# Patient Record
Sex: MALE | Race: BLACK OR AFRICAN AMERICAN | NOT HISPANIC OR LATINO | URBAN - METROPOLITAN AREA
[De-identification: names, ages, dates, MRNs, and addresses within clinical notes are randomized per-mention and may not be internally consistent; named-entity substitution may affect disease eponyms.]

---

## 2017-10-11 ENCOUNTER — INPATIENT (INPATIENT)
Facility: HOSPITAL | Age: 82
LOS: 0 days | Discharge: HOME | DRG: 243 | End: 2017-10-12
Attending: INTERNAL MEDICINE | Admitting: INTERNAL MEDICINE
Payer: MEDICARE

## 2017-10-11 ENCOUNTER — TRANSCRIPTION ENCOUNTER (OUTPATIENT)
Age: 82
End: 2017-10-11

## 2017-10-11 VITALS
WEIGHT: 179.9 LBS | OXYGEN SATURATION: 95 % | HEART RATE: 66 BPM | HEIGHT: 70 IN | RESPIRATION RATE: 16 BRPM | TEMPERATURE: 98 F | SYSTOLIC BLOOD PRESSURE: 154 MMHG | DIASTOLIC BLOOD PRESSURE: 67 MMHG

## 2017-10-11 DIAGNOSIS — Z85.46 PERSONAL HISTORY OF MALIGNANT NEOPLASM OF PROSTATE: Chronic | ICD-10-CM

## 2017-10-11 DIAGNOSIS — I49.9 CARDIAC ARRHYTHMIA, UNSPECIFIED: ICD-10-CM

## 2017-10-11 LAB
ANION GAP SERPL CALC-SCNC: 12 MMOL/L — SIGNIFICANT CHANGE UP (ref 5–17)
ANION GAP SERPL CALC-SCNC: 19 MMOL/L — HIGH (ref 5–17)
APTT BLD: 32.6 SEC — SIGNIFICANT CHANGE UP (ref 27.5–37.4)
BASOPHILS # BLD AUTO: 0 K/UL — SIGNIFICANT CHANGE UP (ref 0–0.2)
BASOPHILS NFR BLD AUTO: 0.4 % — SIGNIFICANT CHANGE UP (ref 0–2)
BUN SERPL-MCNC: 13 MG/DL — SIGNIFICANT CHANGE UP (ref 7–23)
BUN SERPL-MCNC: 20 MG/DL — SIGNIFICANT CHANGE UP (ref 7–23)
CALCIUM SERPL-MCNC: 9.2 MG/DL — SIGNIFICANT CHANGE UP (ref 8.4–10.5)
CALCIUM SERPL-MCNC: 9.7 MG/DL — SIGNIFICANT CHANGE UP (ref 8.4–10.5)
CHLORIDE SERPL-SCNC: 103 MMOL/L — SIGNIFICANT CHANGE UP (ref 96–108)
CHLORIDE SERPL-SCNC: 94 MMOL/L — LOW (ref 96–108)
CO2 SERPL-SCNC: 21 MMOL/L — LOW (ref 22–31)
CO2 SERPL-SCNC: 25 MMOL/L — SIGNIFICANT CHANGE UP (ref 22–31)
CREAT SERPL-MCNC: 0.75 MG/DL — SIGNIFICANT CHANGE UP (ref 0.5–1.3)
CREAT SERPL-MCNC: 0.99 MG/DL — SIGNIFICANT CHANGE UP (ref 0.5–1.3)
EOSINOPHIL # BLD AUTO: 0 K/UL — SIGNIFICANT CHANGE UP (ref 0–0.5)
EOSINOPHIL NFR BLD AUTO: 0.1 % — SIGNIFICANT CHANGE UP (ref 0–6)
GLUCOSE SERPL-MCNC: 101 MG/DL — HIGH (ref 70–99)
GLUCOSE SERPL-MCNC: 164 MG/DL — HIGH (ref 70–99)
HCT VFR BLD CALC: 36.9 % — LOW (ref 39–50)
HCT VFR BLD CALC: 43 % — SIGNIFICANT CHANGE UP (ref 39–50)
HGB BLD-MCNC: 13 G/DL — SIGNIFICANT CHANGE UP (ref 13–17)
HGB BLD-MCNC: 14 G/DL — SIGNIFICANT CHANGE UP (ref 13–17)
INR BLD: 1.14 RATIO — SIGNIFICANT CHANGE UP (ref 0.88–1.16)
LYMPHOCYTES # BLD AUTO: 0.9 K/UL — LOW (ref 1–3.3)
LYMPHOCYTES # BLD AUTO: 15.3 % — SIGNIFICANT CHANGE UP (ref 13–44)
MCHC RBC-ENTMCNC: 27.7 PG — SIGNIFICANT CHANGE UP (ref 27–34)
MCHC RBC-ENTMCNC: 32.6 GM/DL — SIGNIFICANT CHANGE UP (ref 32–36)
MCHC RBC-ENTMCNC: 32.6 PG — SIGNIFICANT CHANGE UP (ref 27–34)
MCHC RBC-ENTMCNC: 35.3 GM/DL — SIGNIFICANT CHANGE UP (ref 32–36)
MCV RBC AUTO: 85 FL — SIGNIFICANT CHANGE UP (ref 80–100)
MCV RBC AUTO: 92.4 FL — SIGNIFICANT CHANGE UP (ref 80–100)
MONOCYTES # BLD AUTO: 0 K/UL — SIGNIFICANT CHANGE UP (ref 0–0.9)
MONOCYTES NFR BLD AUTO: 0.8 % — LOW (ref 2–14)
NEUTROPHILS # BLD AUTO: 4.9 K/UL — SIGNIFICANT CHANGE UP (ref 1.8–7.4)
NEUTROPHILS NFR BLD AUTO: 83.4 % — HIGH (ref 43–77)
PLATELET # BLD AUTO: 189 K/UL — SIGNIFICANT CHANGE UP (ref 150–400)
PLATELET # BLD AUTO: 269 K/UL — SIGNIFICANT CHANGE UP (ref 150–400)
POTASSIUM SERPL-MCNC: 3.5 MMOL/L — SIGNIFICANT CHANGE UP (ref 3.5–5.3)
POTASSIUM SERPL-MCNC: 3.8 MMOL/L — SIGNIFICANT CHANGE UP (ref 3.5–5.3)
POTASSIUM SERPL-SCNC: 3.5 MMOL/L — SIGNIFICANT CHANGE UP (ref 3.5–5.3)
POTASSIUM SERPL-SCNC: 3.8 MMOL/L — SIGNIFICANT CHANGE UP (ref 3.5–5.3)
PROTHROM AB SERPL-ACNC: 12.4 SEC — SIGNIFICANT CHANGE UP (ref 9.8–12.7)
RBC # BLD: 3.99 M/UL — LOW (ref 4.2–5.8)
RBC # BLD: 5.06 M/UL — SIGNIFICANT CHANGE UP (ref 4.2–5.8)
RBC # FLD: 12.8 % — SIGNIFICANT CHANGE UP (ref 10.3–14.5)
RBC # FLD: 13.6 % — SIGNIFICANT CHANGE UP (ref 10.3–14.5)
SODIUM SERPL-SCNC: 134 MMOL/L — LOW (ref 135–145)
SODIUM SERPL-SCNC: 140 MMOL/L — SIGNIFICANT CHANGE UP (ref 135–145)
WBC # BLD: 5.5 K/UL — SIGNIFICANT CHANGE UP (ref 3.8–10.5)
WBC # BLD: 5.9 K/UL — SIGNIFICANT CHANGE UP (ref 3.8–10.5)
WBC # FLD AUTO: 5.5 K/UL — SIGNIFICANT CHANGE UP (ref 3.8–10.5)
WBC # FLD AUTO: 5.9 K/UL — SIGNIFICANT CHANGE UP (ref 3.8–10.5)

## 2017-10-11 PROCEDURE — 93010 ELECTROCARDIOGRAM REPORT: CPT | Mod: 76

## 2017-10-11 PROCEDURE — 71010: CPT | Mod: 26

## 2017-10-11 RX ORDER — AMLODIPINE BESYLATE 2.5 MG/1
5 TABLET ORAL DAILY
Qty: 0 | Refills: 0 | Status: DISCONTINUED | OUTPATIENT
Start: 2017-10-11 | End: 2017-10-12

## 2017-10-11 RX ORDER — SIMVASTATIN 20 MG/1
20 TABLET, FILM COATED ORAL AT BEDTIME
Qty: 0 | Refills: 0 | Status: DISCONTINUED | OUTPATIENT
Start: 2017-10-11 | End: 2017-10-12

## 2017-10-11 RX ORDER — LEVETIRACETAM 250 MG/1
500 TABLET, FILM COATED ORAL
Qty: 0 | Refills: 0 | Status: DISCONTINUED | OUTPATIENT
Start: 2017-10-11 | End: 2017-10-12

## 2017-10-11 RX ORDER — RIVAROXABAN 15 MG-20MG
0.5 KIT ORAL
Qty: 0 | Refills: 0 | COMMUNITY

## 2017-10-11 RX ORDER — TAMSULOSIN HYDROCHLORIDE 0.4 MG/1
0.4 CAPSULE ORAL AT BEDTIME
Qty: 0 | Refills: 0 | Status: DISCONTINUED | OUTPATIENT
Start: 2017-10-11 | End: 2017-10-12

## 2017-10-11 RX ORDER — METOPROLOL TARTRATE 50 MG
50 TABLET ORAL
Qty: 0 | Refills: 0 | Status: DISCONTINUED | OUTPATIENT
Start: 2017-10-11 | End: 2017-10-12

## 2017-10-11 RX ORDER — ASPIRIN/CALCIUM CARB/MAGNESIUM 324 MG
81 TABLET ORAL DAILY
Qty: 0 | Refills: 0 | Status: DISCONTINUED | OUTPATIENT
Start: 2017-10-11 | End: 2017-10-12

## 2017-10-11 RX ORDER — POTASSIUM CHLORIDE 20 MEQ
10 PACKET (EA) ORAL DAILY
Qty: 0 | Refills: 0 | Status: DISCONTINUED | OUTPATIENT
Start: 2017-10-11 | End: 2017-10-12

## 2017-10-11 RX ORDER — CEFAZOLIN SODIUM 1 G
2000 VIAL (EA) INJECTION EVERY 8 HOURS
Qty: 0 | Refills: 0 | Status: COMPLETED | OUTPATIENT
Start: 2017-10-11 | End: 2017-10-12

## 2017-10-11 RX ORDER — SODIUM CHLORIDE 9 MG/ML
3 INJECTION INTRAMUSCULAR; INTRAVENOUS; SUBCUTANEOUS EVERY 8 HOURS
Qty: 0 | Refills: 0 | Status: DISCONTINUED | OUTPATIENT
Start: 2017-10-11 | End: 2017-10-12

## 2017-10-11 RX ADMIN — Medication 50 MILLIGRAM(S): at 17:40

## 2017-10-11 RX ADMIN — Medication 100 MILLIGRAM(S): at 22:23

## 2017-10-11 RX ADMIN — SODIUM CHLORIDE 3 MILLILITER(S): 9 INJECTION INTRAMUSCULAR; INTRAVENOUS; SUBCUTANEOUS at 17:32

## 2017-10-11 RX ADMIN — Medication 81 MILLIGRAM(S): at 17:40

## 2017-10-11 RX ADMIN — LEVETIRACETAM 500 MILLIGRAM(S): 250 TABLET, FILM COATED ORAL at 21:56

## 2017-10-11 RX ADMIN — SODIUM CHLORIDE 3 MILLILITER(S): 9 INJECTION INTRAMUSCULAR; INTRAVENOUS; SUBCUTANEOUS at 21:57

## 2017-10-11 RX ADMIN — TAMSULOSIN HYDROCHLORIDE 0.4 MILLIGRAM(S): 0.4 CAPSULE ORAL at 21:56

## 2017-10-11 RX ADMIN — SIMVASTATIN 20 MILLIGRAM(S): 20 TABLET, FILM COATED ORAL at 21:56

## 2017-10-11 NOTE — H&P CARDIOLOGY - PMH
BPH (benign prostatic hypertrophy)    HF (heart failure)    HTN (Hypertension)    Hyperlipemia    PAF (paroxysmal atrial fibrillation)    Prostate Ca  s/p seed implant  Prostate cancer    Seizure    TIA (Transient Ischemic Attack)

## 2017-10-11 NOTE — H&P CARDIOLOGY - HISTORY OF PRESENT ILLNESS
89 yr old male with PMH of seizure disorder, former smoker,  A Fib, HTN, prostate cancer- s/p seed implant, BPH, 89 yr old male with PMH of TIA, seizure disorder, former smoker,  A Fib on Xarelto (last dose Sunday),  HTN, HLD, Systolic HF with EF 30-35% in 2016,  prostate cancer- s/p seed implant, BPH, presents today for PPM implant. Patient reports ADAMS and intermittent dizziness for which he was evaluated by Dr Trejo. No syncope. Referred today for PPM implant.    Of note- patient reports he slipped on wet floor a week ago- with large ecchymosis extending from lumbar spine to right buttock and  thigh. No hematoma palpated. Denies spinal or hip pain. Did not seek medical attention. States he did not hit head or have LOC. Please evaluate further post procedure as necessary

## 2017-10-12 VITALS
SYSTOLIC BLOOD PRESSURE: 126 MMHG | DIASTOLIC BLOOD PRESSURE: 67 MMHG | HEART RATE: 61 BPM | RESPIRATION RATE: 18 BRPM | TEMPERATURE: 98 F | OXYGEN SATURATION: 100 %

## 2017-10-12 LAB
ANION GAP SERPL CALC-SCNC: 12 MMOL/L — SIGNIFICANT CHANGE UP (ref 5–17)
BUN SERPL-MCNC: 16 MG/DL — SIGNIFICANT CHANGE UP (ref 7–23)
CALCIUM SERPL-MCNC: 9.1 MG/DL — SIGNIFICANT CHANGE UP (ref 8.4–10.5)
CHLORIDE SERPL-SCNC: 103 MMOL/L — SIGNIFICANT CHANGE UP (ref 96–108)
CO2 SERPL-SCNC: 26 MMOL/L — SIGNIFICANT CHANGE UP (ref 22–31)
CREAT SERPL-MCNC: 0.88 MG/DL — SIGNIFICANT CHANGE UP (ref 0.5–1.3)
GLUCOSE SERPL-MCNC: 105 MG/DL — HIGH (ref 70–99)
HCT VFR BLD CALC: 37.3 % — LOW (ref 39–50)
HGB BLD-MCNC: 12.7 G/DL — LOW (ref 13–17)
MCHC RBC-ENTMCNC: 31.3 PG — SIGNIFICANT CHANGE UP (ref 27–34)
MCHC RBC-ENTMCNC: 34 GM/DL — SIGNIFICANT CHANGE UP (ref 32–36)
MCV RBC AUTO: 92.2 FL — SIGNIFICANT CHANGE UP (ref 80–100)
PLATELET # BLD AUTO: 203 K/UL — SIGNIFICANT CHANGE UP (ref 150–400)
POTASSIUM SERPL-MCNC: 4.1 MMOL/L — SIGNIFICANT CHANGE UP (ref 3.5–5.3)
POTASSIUM SERPL-SCNC: 4.1 MMOL/L — SIGNIFICANT CHANGE UP (ref 3.5–5.3)
RBC # BLD: 4.05 M/UL — LOW (ref 4.2–5.8)
RBC # FLD: 12.7 % — SIGNIFICANT CHANGE UP (ref 10.3–14.5)
SODIUM SERPL-SCNC: 141 MMOL/L — SIGNIFICANT CHANGE UP (ref 135–145)
WBC # BLD: 5.2 K/UL — SIGNIFICANT CHANGE UP (ref 3.8–10.5)
WBC # FLD AUTO: 5.2 K/UL — SIGNIFICANT CHANGE UP (ref 3.8–10.5)

## 2017-10-12 RX ORDER — METOPROLOL TARTRATE 50 MG
1 TABLET ORAL
Qty: 30 | Refills: 0 | OUTPATIENT
Start: 2017-10-12 | End: 2017-11-11

## 2017-10-12 RX ORDER — ASPIRIN/CALCIUM CARB/MAGNESIUM 324 MG
1 TABLET ORAL
Qty: 30 | Refills: 2 | OUTPATIENT
Start: 2017-10-12 | End: 2018-01-09

## 2017-10-12 RX ORDER — ASPIRIN/CALCIUM CARB/MAGNESIUM 324 MG
1 TABLET ORAL
Qty: 0 | Refills: 0 | COMMUNITY
Start: 2017-10-12

## 2017-10-12 RX ORDER — ACETAMINOPHEN 500 MG
650 TABLET ORAL ONCE
Qty: 0 | Refills: 0 | Status: COMPLETED | OUTPATIENT
Start: 2017-10-12 | End: 2017-10-12

## 2017-10-12 RX ORDER — METOPROLOL TARTRATE 50 MG
1 TABLET ORAL
Qty: 0 | Refills: 0 | COMMUNITY
Start: 2017-10-12

## 2017-10-12 RX ORDER — AMLODIPINE BESYLATE 2.5 MG/1
1 TABLET ORAL
Qty: 30 | Refills: 2 | OUTPATIENT
Start: 2017-10-12 | End: 2018-01-09

## 2017-10-12 RX ORDER — AMLODIPINE BESYLATE 2.5 MG/1
1 TABLET ORAL
Qty: 30 | Refills: 3 | OUTPATIENT
Start: 2017-10-12 | End: 2018-02-08

## 2017-10-12 RX ORDER — METOPROLOL TARTRATE 50 MG
1 TABLET ORAL
Qty: 30 | Refills: 2 | OUTPATIENT
Start: 2017-10-12 | End: 2018-01-09

## 2017-10-12 RX ORDER — RIVAROXABAN 15 MG-20MG
1 KIT ORAL
Qty: 0 | Refills: 0 | COMMUNITY

## 2017-10-12 RX ORDER — AMLODIPINE BESYLATE 2.5 MG/1
0.5 TABLET ORAL
Qty: 0 | Refills: 0 | COMMUNITY

## 2017-10-12 RX ORDER — METOPROLOL TARTRATE 50 MG
50 TABLET ORAL ONCE
Qty: 0 | Refills: 0 | Status: COMPLETED | OUTPATIENT
Start: 2017-10-12 | End: 2017-10-12

## 2017-10-12 RX ADMIN — SODIUM CHLORIDE 3 MILLILITER(S): 9 INJECTION INTRAMUSCULAR; INTRAVENOUS; SUBCUTANEOUS at 13:24

## 2017-10-12 RX ADMIN — LEVETIRACETAM 500 MILLIGRAM(S): 250 TABLET, FILM COATED ORAL at 05:30

## 2017-10-12 RX ADMIN — Medication 81 MILLIGRAM(S): at 11:45

## 2017-10-12 RX ADMIN — Medication 50 MILLIGRAM(S): at 10:28

## 2017-10-12 RX ADMIN — SODIUM CHLORIDE 3 MILLILITER(S): 9 INJECTION INTRAMUSCULAR; INTRAVENOUS; SUBCUTANEOUS at 05:30

## 2017-10-12 RX ADMIN — AMLODIPINE BESYLATE 5 MILLIGRAM(S): 2.5 TABLET ORAL at 05:30

## 2017-10-12 RX ADMIN — Medication 650 MILLIGRAM(S): at 09:33

## 2017-10-12 RX ADMIN — Medication 100 MILLIGRAM(S): at 05:30

## 2017-10-12 RX ADMIN — Medication 50 MILLIGRAM(S): at 05:30

## 2017-10-12 RX ADMIN — Medication 10 MILLIEQUIVALENT(S): at 11:45

## 2017-10-12 RX ADMIN — Medication 100 MILLIGRAM(S): at 13:24

## 2017-10-12 RX ADMIN — Medication 650 MILLIGRAM(S): at 10:00

## 2017-10-12 NOTE — DISCHARGE NOTE ADULT - HOSPITAL COURSE
89 yr old male with PMH of TIA, seizure disorder, former smoker,  A Fib on Xarelto (last dose Sunday),  HTN, HLD, Systolic HF with EF 30-35% in 2016,  prostate cancer- s/p seed implant, BPH, presents today for PPM implant. Patient reports ADAMS and intermittent dizziness for which he was evaluated by Dr Trejo. No syncope. Referred today for PPM implant.    Of note- patient reports he slipped on wet floor a week ago- with large ecchymosis extending from lumbar spine to right buttock and  thigh. No hematoma palpated. Denies spinal or hip pain. Did not seek medical attention. States he did not hit head or have LOC. Please evaluate further post procedure as necessary (11 Oct 2017 11:25).    10/11 Pacemaker placed. Left chest wall site without swelling, bleeding. 89 yr old male with PMH of TIA, seizure disorder, former smoker,  A Fib on Xarelto (last dose Sunday),  HTN, HLD, Systolic HF with EF 30-35% in 2016,  prostate cancer- s/p seed implant, BPH, presents today for PPM implant. Patient reports ADAMS and intermittent dizziness for which he was evaluated by Dr Trejo. No syncope. Referred today for PPM implant.    Of note- patient reports he slipped on wet floor a week ago- with large ecchymosis extending from lumbar spine to right buttock and  thigh. No hematoma palpated. Denies spinal or hip pain. Did not seek medical attention. States he did not hit head or have LOC. Please evaluate further post procedure as necessary (11 Oct 2017 11:25).    10/11 Pacemaker placed. Left chest wall site without swelling, bleeding. The patient has been placed on toprol xl 100 mg at discharge, along with aspirin 81mg daily

## 2017-10-12 NOTE — PROGRESS NOTE ADULT - ASSESSMENT
afib, with  ssx,  s/p  ppm   s/p  mlple  syncopal episodes  at home  on toprol, asa.  norvasc   ok  frp  dc. per  card,

## 2017-10-12 NOTE — DISCHARGE NOTE ADULT - PATIENT PORTAL LINK FT
“You can access the Follow[facility] Patient Portal, offered by [facility], by registering with the following website: [email or web address]”

## 2017-10-12 NOTE — PROGRESS NOTE ADULT - SUBJECTIVE AND OBJECTIVE BOX
- Patient seen and examined.  - In summary, patient is a 89y year old man who presented for Pacemaker placement (12 Oct 2017 02:20)  - Today, patient is without complaints.         *****MEDICATIONS:    MEDICATIONS  (STANDING):  amLODIPine   Tablet 5 milliGRAM(s) Oral daily  aspirin enteric coated 81 milliGRAM(s) Oral daily  ceFAZolin   IVPB 2000 milliGRAM(s) IV Intermittent every 8 hours  levETIRAcetam 500 milliGRAM(s) Oral two times a day  metoprolol succinate ER 50 milliGRAM(s) Oral two times a day  potassium chloride    Tablet ER 10 milliEquivalent(s) Oral daily  simvastatin 20 milliGRAM(s) Oral at bedtime  sodium chloride 0.9% lock flush 3 milliLiter(s) IV Push every 8 hours  tamsulosin 0.4 milliGRAM(s) Oral at bedtime    MEDICATIONS  (PRN):           ***** REVIEW OF SYSTEM:  GEN: no fever, no chills, no pain  RESP: no SOB, no cough, no sputum  CVS: no chest pain, no palpitations, no edema  GI: no abdominal pain, no nausea, no vomiting, no constipation, no diarrhea  : no dysurea, no frequency  NEURO: no headache, no diziness  PSYCH: no depression, not anxious  Derm : no itching, no rash         ***** VITAL SIGNS:  T(F): 97.9 (10-12-17 @ 05:16), Max: 98 (10-11-17 @ 16:30)  HR: 60 (10-12-17 @ 10:26) (60 - 71)  BP: 135/64 (10-12-17 @ 10:26) (135/64 - 166/72)  RR: 17 (10-12-17 @ 05:16) (16 - 18)  SpO2: 99% (10-12-17 @ 05:16) (95% - 99%)  Wt(kg): --  ,   I&O's Summary    11 Oct 2017 07:01  -  12 Oct 2017 07:00  --------------------------------------------------------  IN: 770 mL / OUT: 1200 mL / NET: -430 mL    12 Oct 2017 07:01  -  12 Oct 2017 11:09  --------------------------------------------------------  IN: 150 mL / OUT: 600 mL / NET: -450 mL             *****PHYSICAL EXAM:  GEN: A&O X 3 , NAD , comfortable  HEENT: NCAT, EOMI, MMM, no icterus  NECK: Supple, No JVD  CVS: S1S2 , regular , No M/R/G appreciated  PULM: CTA B/L,  no W/R/R appreciated  ABD.: soft. non tender, non distended,  bowel sounds present  Extrem: intact pulses , no edema noted  Derm: No rash or ecchymosis noted  PSYCH: normal mood, no depression, not anxious         *****LAB AND IMAGIN.7   5.2   )-----------( 203      ( 12 Oct 2017 05:54 )             37.3               10-12    141  |  103  |  16  ----------------------------<  105<H>  4.1   |  26  |  0.88    Ca    9.1      12 Oct 2017 05:54      PT/INR - ( 11 Oct 2017 11:44 )   PT: 12.4 sec;   INR: 1.14 ratio         PTT - ( 11 Oct 2017 11:44 )  PTT:32.6 sec                     [All pertinent recent Imaging/Reports reviewed]         *****A S S E S S M E N T   A N D   P L A N :  89M s/p PPM  no complaints  plan home today  cont current meds  f/u Dr Trejo 2 weeks        __________________________  DALILA Robertson D.O.

## 2017-10-12 NOTE — DISCHARGE NOTE ADULT - MEDICATION SUMMARY - MEDICATIONS TO STOP TAKING
I will STOP taking the medications listed below when I get home from the hospital:  None I will STOP taking the medications listed below when I get home from the hospital:    metoprolol succinate 25 mg oral tablet, extended release  -- 1 tab(s) by mouth once a day    amLODIPine 10 mg oral tablet  -- 0.5 tab(s) by mouth once a day    Xarelto 20 mg oral tablet  -- 1 tab(s) by mouth once a day

## 2017-10-12 NOTE — DISCHARGE NOTE ADULT - CARE PLAN
Principal Discharge DX:	S/P placement of cardiac pacemaker  Goal:	Your heart rate will be controlled.  Instructions for follow-up, activity and diet:	Continue with follow-up visits to your electrophysiology team and with your home remote device monitoring (if applicable). Continue your medications as prescribed.  Secondary Diagnosis:	HTN (hypertension), benign  Goal:	Your blood pressure will be controlled.  Instructions for follow-up, activity and diet:	Continue with your blood pressure medications; eat a heart healthy diet with low salt diet; exercise regularly (consult with your physician or cardiologist first); maintain a heart healthy weight; if you smoke - quit (A resource to help you stop smoking is the [facility] [employer] [facility] – phone number [phone].); include healthy ways to manage stress. Continue to follow with your primary care physician or cardiologist.  Secondary Diagnosis:	Hyperlipidemia, unspecified hyperlipidemia type  Goal:	Your LDL cholesterol will be less than 70mg/dL  Instructions for follow-up, activity and diet:	Continue with your cholesterol medications. Eat a heart healthy diet that is low in saturated fats and salt, and includes whole grains, fruits, vegetables and lean protein; exercise regularly (consult with your physician or cardiologist first); maintain a heart healthy weight; if you smoke - quit (A resource to help you stop smoking is the [facility] [employer] [facility] – phone number [phone].). Continue to follow with your primary physician or cardiologist.  Secondary Diagnosis:	PAF (paroxysmal atrial fibrillation)  Goal:	Your heart rate and rhythm will be controlled.  Instructions for follow-up, activity and diet:	Continue with your cardiologist and primary care MD. Continue your current medications. Call your physician for palpitations, feelings of rapid heart beat, lightheadedness, or dizziness.

## 2017-10-12 NOTE — DISCHARGE NOTE ADULT - SECONDARY DIAGNOSIS.
HTN (hypertension), benign Hyperlipidemia, unspecified hyperlipidemia type PAF (paroxysmal atrial fibrillation)

## 2017-10-12 NOTE — DISCHARGE NOTE ADULT - MEDICATION SUMMARY - MEDICATIONS TO TAKE
I will START or STAY ON the medications listed below when I get home from the hospital:    aspirin 81 mg oral delayed release tablet  -- 1 tab(s) by mouth once a day  -- Indication: For Cardiac arrhythmia    tamsulosin 0.4 mg oral capsule  -- 1 cap(s) by mouth once a day (at bedtime)  -- Indication: For H/O prostate cancer    Xarelto 20 mg oral tablet  -- 1 tab(s) by mouth once a day  -- Indication: For Cardiac arrhythmia    levETIRAcetam 500 mg oral tablet  -- 1 tab(s) by mouth 2 times a day  -- Indication: For seizures    simvastatin 20 mg oral tablet  -- 1 tab(s) by mouth once a day (at bedtime)  -- Indication: For Hyperlipidemia    metoprolol succinate 25 mg oral tablet, extended release  -- 1 tab(s) by mouth once a day  -- Indication: For Hypertension    amLODIPine 10 mg oral tablet  -- 0.5 tab(s) by mouth once a day  -- Indication: For Hypertension    potassium chloride 10 mEq oral capsule, extended release  -- 1 cap(s) by mouth once a day  -- Indication: For potassium supplement I will START or STAY ON the medications listed below when I get home from the hospital:    Aspirin Enteric Coated 81 mg oral delayed release tablet  -- 1 tab(s) by mouth once a day  -- Indication: For Heart protection/ blood thinner    tamsulosin 0.4 mg oral capsule  -- 1 cap(s) by mouth once a day (at bedtime)  -- Indication: For history of  prostate cancer    levETIRAcetam 500 mg oral tablet  -- 1 tab(s) by mouth 2 times a day  -- Indication: For seizures    simvastatin 20 mg oral tablet  -- 1 tab(s) by mouth once a day (at bedtime)  -- Indication: For Hyperlipidemia    Metoprolol Succinate  mg oral tablet, extended release  -- 1 tab(s) by mouth once a day   -- It is very important that you take or use this exactly as directed.  Do not skip doses or discontinue unless directed by your doctor.  May cause drowsiness.  Alcohol may intensify this effect.  Use care when operating dangerous machinery.  Some non-prescription drugs may aggravate your condition.  Read all labels carefully.  If a warning appears, check with your doctor before taking.  Swallow whole.  Do not crush.  Take with food or milk.  This drug may impair the ability to drive or operate machinery.  Use care until you become familiar with its effects.    -- Indication: For High blood pressure/ heart protection    amLODIPine 5 mg oral tablet  -- 1 tab(s) by mouth once a day   -- It is very important that you take or use this exactly as directed.  Do not skip doses or discontinue unless directed by your doctor.  Some non-prescription drugs may aggravate your condition.  Read all labels carefully.  If a warning appears, check with your doctor before taking.    -- Indication: For High blood pressure    potassium chloride 10 mEq oral capsule, extended release  -- 1 cap(s) by mouth once a day  -- Indication: For potassium supplement

## 2017-10-12 NOTE — DISCHARGE NOTE ADULT - CARE PROVIDER_API CALL
Clarke Trejo (DO), Cardiology Medicine  [address] [place] [id number]  Phone: [phone]  Fax: [phone]

## 2017-10-12 NOTE — DISCHARGE NOTE ADULT - PLAN OF CARE
Your heart rate will be controlled. Continue with follow-up visits to your electrophysiology team and with your home remote device monitoring (if applicable). Continue your medications as prescribed. Your blood pressure will be controlled. Continue with your blood pressure medications; eat a heart healthy diet with low salt diet; exercise regularly (consult with your physician or cardiologist first); maintain a heart healthy weight; if you smoke - quit (A resource to help you stop smoking is the [facility] – phone number [phone].); include healthy ways to manage stress. Continue to follow with your primary care physician or cardiologist. Your LDL cholesterol will be less than 70mg/dL Continue with your cholesterol medications. Eat a heart healthy diet that is low in saturated fats and salt, and includes whole grains, fruits, vegetables and lean protein; exercise regularly (consult with your physician or cardiologist first); maintain a heart healthy weight; if you smoke - quit (A resource to help you stop smoking is the [facility] – phone number [phone].). Continue to follow with your primary physician or cardiologist. Your heart rate and rhythm will be controlled. Continue with your cardiologist and primary care MD. Continue your current medications. Call your physician for palpitations, feelings of rapid heart beat, lightheadedness, or dizziness.

## 2017-10-12 NOTE — PROGRESS NOTE ADULT - SUBJECTIVE AND OBJECTIVE BOX
tele,  paced    MEDICATIONS  (STANDING):  acetaminophen   Tablet. 650 milliGRAM(s) Oral once  amLODIPine   Tablet 5 milliGRAM(s) Oral daily  aspirin enteric coated 81 milliGRAM(s) Oral daily  ceFAZolin   IVPB 2000 milliGRAM(s) IV Intermittent every 8 hours  levETIRAcetam 500 milliGRAM(s) Oral two times a day  metoprolol succinate ER 50 milliGRAM(s) Oral two times a day  potassium chloride    Tablet ER 10 milliEquivalent(s) Oral daily  simvastatin 20 milliGRAM(s) Oral at bedtime  sodium chloride 0.9% lock flush 3 milliLiter(s) IV Push every 8 hours  tamsulosin 0.4 milliGRAM(s) Oral at bedtime    MEDICATIONS  (PRN):      Vital Signs Last 24 Hrs  T(C): 36.6 (12 Oct 2017 05:16), Max: 36.7 (11 Oct 2017 16:30)  T(F): 97.9 (12 Oct 2017 05:16), Max: 98 (11 Oct 2017 16:30)  HR: 60 (12 Oct 2017 05:16) (60 - 71)  BP: 149/69 (12 Oct 2017 05:16) (140/64 - 166/72)  BP(mean): 96 (11 Oct 2017 11:25) (96 - 96)  RR: 17 (12 Oct 2017 05:16) (16 - 18)  SpO2: 99% (12 Oct 2017 05:16) (95% - 99%)  CAPILLARY BLOOD GLUCOSE        I&O's Summary    11 Oct 2017 07:01  -  12 Oct 2017 07:00  --------------------------------------------------------  IN: 770 mL / OUT: 1200 mL / NET: -430 mL    12 Oct 2017 07:01  -  12 Oct 2017 09:32  --------------------------------------------------------  IN: 150 mL / OUT: 600 mL / NET: -450 mL        PHYSICAL EXAM:  HEAD:  Atraumatic, Normocephalic  NECK: Supple, No JVD  CHEST/LUNG: Clear to auscultation bilaterally; No wheeze  HEART: Regular rate and rhythm; No murmurs, rubs, or gallops  ABDOMEN: Soft, Nontender, ; Bowel sounds   EXTREMITIES:    no  edema  NEUROLOGY:  alert    LABS:                        12.7   5.2   )-----------( 203      ( 12 Oct 2017 05:54 )             37.3     10-12    141  |  103  |  16  ----------------------------<  105<H>  4.1   |  26  |  0.88    Ca    9.1      12 Oct 2017 05:54      PT/INR - ( 11 Oct 2017 11:44 )   PT: 12.4 sec;   INR: 1.14 ratio         PTT - ( 11 Oct 2017 11:44 )  PTT:32.6 sec                    Consultant(s) Notes Reviewed:      Care Discussed with Consultants/Other Providers:

## 2017-11-07 ENCOUNTER — APPOINTMENT (OUTPATIENT)
Dept: ELECTROPHYSIOLOGY | Facility: CLINIC | Age: 82
End: 2017-11-07

## 2017-12-19 PROCEDURE — C1785: CPT

## 2017-12-19 PROCEDURE — 85027 COMPLETE CBC AUTOMATED: CPT

## 2017-12-19 PROCEDURE — 85730 THROMBOPLASTIN TIME PARTIAL: CPT

## 2017-12-19 PROCEDURE — 85610 PROTHROMBIN TIME: CPT

## 2017-12-19 PROCEDURE — C1892: CPT

## 2017-12-19 PROCEDURE — 93005 ELECTROCARDIOGRAM TRACING: CPT

## 2017-12-19 PROCEDURE — 80048 BASIC METABOLIC PNL TOTAL CA: CPT

## 2017-12-19 PROCEDURE — C1894: CPT

## 2017-12-19 PROCEDURE — C1898: CPT

## 2017-12-19 PROCEDURE — 71045 X-RAY EXAM CHEST 1 VIEW: CPT

## 2018-04-19 ENCOUNTER — INPATIENT (INPATIENT)
Facility: HOSPITAL | Age: 83
LOS: 0 days | Discharge: HOME | DRG: 699 | End: 2018-04-20
Attending: INTERNAL MEDICINE | Admitting: INTERNAL MEDICINE
Payer: MEDICARE

## 2018-04-19 VITALS
OXYGEN SATURATION: 98 % | TEMPERATURE: 98 F | DIASTOLIC BLOOD PRESSURE: 112 MMHG | HEART RATE: 101 BPM | WEIGHT: 179.9 LBS | RESPIRATION RATE: 20 BRPM | SYSTOLIC BLOOD PRESSURE: 175 MMHG

## 2018-04-19 DIAGNOSIS — R31.9 HEMATURIA, UNSPECIFIED: ICD-10-CM

## 2018-04-19 DIAGNOSIS — Z85.46 PERSONAL HISTORY OF MALIGNANT NEOPLASM OF PROSTATE: Chronic | ICD-10-CM

## 2018-04-19 LAB
ALBUMIN SERPL ELPH-MCNC: 4.5 G/DL — SIGNIFICANT CHANGE UP (ref 3.3–5)
ALP SERPL-CCNC: 77 U/L — SIGNIFICANT CHANGE UP (ref 40–120)
ALT FLD-CCNC: 20 U/L — SIGNIFICANT CHANGE UP (ref 10–45)
ANION GAP SERPL CALC-SCNC: 14 MMOL/L — SIGNIFICANT CHANGE UP (ref 5–17)
APPEARANCE UR: ABNORMAL
AST SERPL-CCNC: 24 U/L — SIGNIFICANT CHANGE UP (ref 10–40)
BASE EXCESS BLDV CALC-SCNC: 4.2 MMOL/L — HIGH (ref -2–2)
BASOPHILS # BLD AUTO: 0.1 K/UL — SIGNIFICANT CHANGE UP (ref 0–0.2)
BASOPHILS NFR BLD AUTO: 1.6 % — SIGNIFICANT CHANGE UP (ref 0–2)
BILIRUB SERPL-MCNC: 0.3 MG/DL — SIGNIFICANT CHANGE UP (ref 0.2–1.2)
BILIRUB UR-MCNC: NEGATIVE — SIGNIFICANT CHANGE UP
BUN SERPL-MCNC: 22 MG/DL — SIGNIFICANT CHANGE UP (ref 7–23)
CA-I SERPL-SCNC: 1.28 MMOL/L — SIGNIFICANT CHANGE UP (ref 1.12–1.3)
CALCIUM SERPL-MCNC: 9.9 MG/DL — SIGNIFICANT CHANGE UP (ref 8.4–10.5)
CHLORIDE BLDV-SCNC: 102 MMOL/L — SIGNIFICANT CHANGE UP (ref 96–108)
CHLORIDE SERPL-SCNC: 100 MMOL/L — SIGNIFICANT CHANGE UP (ref 96–108)
CO2 BLDV-SCNC: 33 MMOL/L — HIGH (ref 22–30)
CO2 SERPL-SCNC: 27 MMOL/L — SIGNIFICANT CHANGE UP (ref 22–31)
COLOR SPEC: YELLOW — SIGNIFICANT CHANGE UP
CREAT SERPL-MCNC: 1.16 MG/DL — SIGNIFICANT CHANGE UP (ref 0.5–1.3)
DIFF PNL FLD: ABNORMAL
EOSINOPHIL # BLD AUTO: 0.1 K/UL — SIGNIFICANT CHANGE UP (ref 0–0.5)
EOSINOPHIL NFR BLD AUTO: 2.3 % — SIGNIFICANT CHANGE UP (ref 0–6)
GAS PNL BLDV: 140 MMOL/L — SIGNIFICANT CHANGE UP (ref 136–145)
GAS PNL BLDV: SIGNIFICANT CHANGE UP
GAS PNL BLDV: SIGNIFICANT CHANGE UP
GLUCOSE BLDV-MCNC: 124 MG/DL — HIGH (ref 70–99)
GLUCOSE SERPL-MCNC: 123 MG/DL — HIGH (ref 70–99)
GLUCOSE UR QL: NEGATIVE — SIGNIFICANT CHANGE UP
HCO3 BLDV-SCNC: 31 MMOL/L — HIGH (ref 21–29)
HCT VFR BLD CALC: 43.9 % — SIGNIFICANT CHANGE UP (ref 39–50)
HCT VFR BLDA CALC: 45 % — SIGNIFICANT CHANGE UP (ref 39–50)
HGB BLD CALC-MCNC: 14.7 G/DL — SIGNIFICANT CHANGE UP (ref 13–17)
HGB BLD-MCNC: 14.7 G/DL — SIGNIFICANT CHANGE UP (ref 13–17)
KETONES UR-MCNC: NEGATIVE — SIGNIFICANT CHANGE UP
LACTATE BLDV-MCNC: 1.6 MMOL/L — SIGNIFICANT CHANGE UP (ref 0.7–2)
LEUKOCYTE ESTERASE UR-ACNC: ABNORMAL
LYMPHOCYTES # BLD AUTO: 2 K/UL — SIGNIFICANT CHANGE UP (ref 1–3.3)
LYMPHOCYTES # BLD AUTO: 49.6 % — HIGH (ref 13–44)
MCHC RBC-ENTMCNC: 30.6 PG — SIGNIFICANT CHANGE UP (ref 27–34)
MCHC RBC-ENTMCNC: 33.5 GM/DL — SIGNIFICANT CHANGE UP (ref 32–36)
MCV RBC AUTO: 91.4 FL — SIGNIFICANT CHANGE UP (ref 80–100)
MONOCYTES # BLD AUTO: 0.5 K/UL — SIGNIFICANT CHANGE UP (ref 0–0.9)
MONOCYTES NFR BLD AUTO: 11.9 % — SIGNIFICANT CHANGE UP (ref 2–14)
NEUTROPHILS # BLD AUTO: 1.4 K/UL — LOW (ref 1.8–7.4)
NEUTROPHILS NFR BLD AUTO: 34.6 % — LOW (ref 43–77)
NITRITE UR-MCNC: NEGATIVE — SIGNIFICANT CHANGE UP
OTHER CELLS CSF MANUAL: 3 ML/DL — LOW (ref 18–22)
PCO2 BLDV: 59 MMHG — HIGH (ref 35–50)
PH BLDV: 7.34 — LOW (ref 7.35–7.45)
PH UR: 6.5 — SIGNIFICANT CHANGE UP (ref 5–8)
PLATELET # BLD AUTO: 233 K/UL — SIGNIFICANT CHANGE UP (ref 150–400)
PO2 BLDV: 18 MMHG — LOW (ref 25–45)
POTASSIUM BLDV-SCNC: 3.5 MMOL/L — SIGNIFICANT CHANGE UP (ref 3.5–5)
POTASSIUM SERPL-MCNC: 3.7 MMOL/L — SIGNIFICANT CHANGE UP (ref 3.5–5.3)
POTASSIUM SERPL-SCNC: 3.7 MMOL/L — SIGNIFICANT CHANGE UP (ref 3.5–5.3)
PROT SERPL-MCNC: 8.1 G/DL — SIGNIFICANT CHANGE UP (ref 6–8.3)
PROT UR-MCNC: 100 MG/DL
RBC # BLD: 4.8 M/UL — SIGNIFICANT CHANGE UP (ref 4.2–5.8)
RBC # FLD: 13.8 % — SIGNIFICANT CHANGE UP (ref 10.3–14.5)
RBC CASTS # UR COMP ASSIST: >50 /HPF (ref 0–2)
SAO2 % BLDV: 16 % — LOW (ref 67–88)
SODIUM SERPL-SCNC: 141 MMOL/L — SIGNIFICANT CHANGE UP (ref 135–145)
SP GR SPEC: 1.02 — SIGNIFICANT CHANGE UP (ref 1.01–1.02)
UROBILINOGEN FLD QL: NEGATIVE — SIGNIFICANT CHANGE UP
WBC # BLD: 4 K/UL — SIGNIFICANT CHANGE UP (ref 3.8–10.5)
WBC # FLD AUTO: 4 K/UL — SIGNIFICANT CHANGE UP (ref 3.8–10.5)
WBC UR QL: SIGNIFICANT CHANGE UP /HPF (ref 0–5)

## 2018-04-19 PROCEDURE — 71046 X-RAY EXAM CHEST 2 VIEWS: CPT | Mod: 26

## 2018-04-19 PROCEDURE — 74176 CT ABD & PELVIS W/O CONTRAST: CPT | Mod: 26

## 2018-04-19 PROCEDURE — 99284 EMERGENCY DEPT VISIT MOD MDM: CPT | Mod: 25

## 2018-04-19 PROCEDURE — 93010 ELECTROCARDIOGRAM REPORT: CPT

## 2018-04-19 RX ORDER — SIMVASTATIN 20 MG/1
20 TABLET, FILM COATED ORAL AT BEDTIME
Qty: 0 | Refills: 0 | Status: DISCONTINUED | OUTPATIENT
Start: 2018-04-19 | End: 2018-04-20

## 2018-04-19 RX ORDER — LEVETIRACETAM 250 MG/1
500 TABLET, FILM COATED ORAL
Qty: 0 | Refills: 0 | Status: DISCONTINUED | OUTPATIENT
Start: 2018-04-19 | End: 2018-04-20

## 2018-04-19 RX ORDER — TAMSULOSIN HYDROCHLORIDE 0.4 MG/1
0.4 CAPSULE ORAL AT BEDTIME
Qty: 0 | Refills: 0 | Status: DISCONTINUED | OUTPATIENT
Start: 2018-04-19 | End: 2018-04-20

## 2018-04-19 RX ORDER — LEVETIRACETAM 250 MG/1
500 TABLET, FILM COATED ORAL
Qty: 0 | Refills: 0 | Status: DISCONTINUED | OUTPATIENT
Start: 2018-04-19 | End: 2018-04-19

## 2018-04-19 RX ORDER — METOPROLOL TARTRATE 50 MG
100 TABLET ORAL DAILY
Qty: 0 | Refills: 0 | Status: DISCONTINUED | OUTPATIENT
Start: 2018-04-19 | End: 2018-04-20

## 2018-04-19 RX ORDER — METOPROLOL TARTRATE 50 MG
100 TABLET ORAL
Qty: 0 | Refills: 0 | Status: DISCONTINUED | OUTPATIENT
Start: 2018-04-19 | End: 2018-04-19

## 2018-04-19 NOTE — CONSULT NOTE ADULT - SUBJECTIVE AND OBJECTIVE BOX
History of Present Illness		  89 yr old male with  MH of TIA, seizure disorder, former smoker,  A Fib on Xarelto ,   HTN, HLD, Systolic HF with EF 30-35% in 2016,  prostate cancer- s/p seed implant,   now  with dark urine and  elevated dp, in er  afebrile  now     Past Medical History:  BPH (benign prostatic hypertrophy)    HF (heart failure)    HTN (Hypertension)    Hyperlipemia    PAF (paroxysmal atrial fibrillation)    Prostate Ca  s/p seed implant  Prostate cancer    Seizure    TIA (Transient Ischemic Attack).    REVIEW OF SYSTEMS:  CONSTITUTIONAL: No weakness,  no   fevers      RESPIRATORY:  No    shortness of breath  CARDIOVASCULAR: No chest pain,   GASTROINTESTINAL: No abdominal  pain  NEUROLOGICAL: No  focal  weakness  dark urine  PHYSICAL EXAMINATION:  Vital Signs Last 24 Hrs  T(C): 36.7 (19 Apr 2018 18:29), Max: 36.7 (19 Apr 2018 18:29)  T(F): 98.1 (19 Apr 2018 18:29), Max: 98.1 (19 Apr 2018 18:29)  HR: 101 (19 Apr 2018 18:29) (101 - 101)  BP: 175/112 (19 Apr 2018 18:29) (175/112 - 175/112)  BP(mean): --  RR: 20 (19 Apr 2018 18:29) (20 - 20)  SpO2: 98% (19 Apr 2018 18:29) (98% - 98%)  CAPILLARY BLOOD GLUCOSE            GENERAL: NAD, well-groomed,  HEAD:  atraumatic, normocephalic  EYES: sclera anicteric  ENMT: mucous membranes moist  NECK: supple, No JVD  CHEST/LUNG: clear to auscultation bilaterally;    no      rales   ,   no rhonchi,   HEART: normal S1, S2  ABDOMEN: BS+, soft, ND, NT   EXTREMITIES:    no    edema    b/l LEs  NEURO: awake, ,     moves all extremities  SKIN: no     rash    LABS:pending History of Present Illness		  89 yr old male with  MH of TIA, seizure disorder, former smoker,  A Fib on eliquis,    HTN, HLD, Systolic HF with EF 30-35% in 2016,  prostate cancer- s/p seed implant,  25 yrs  ago  now  with dark urine, for past few  weeks, per family,  and  elevated   bp , in er  afebrile  now  family at  beside     Past Medical History:  BPH (benign prostatic hypertrophy)    HF (heart failure)    HTN (Hypertension)    Hyperlipemia    PAF (paroxysmal atrial fibrillation)    Prostate Ca  s/p seed implant  Seizure    TIA (Transient Ischemic Attack).    REVIEW OF SYSTEMS:  CONSTITUTIONAL: No weakness,  no   fevers      RESPIRATORY:  No    shortness of breath  CARDIOVASCULAR: No chest pain,   GASTROINTESTINAL: No abdominal  pain  NEUROLOGICAL: No  focal  weakness  dark urine  PHYSICAL EXAMINATION:  Vital Signs Last 24 Hrs  T(C): 36.7 (19 Apr 2018 18:29), Max: 36.7 (19 Apr 2018 18:29)  T(F): 98.1 (19 Apr 2018 18:29), Max: 98.1 (19 Apr 2018 18:29)  HR: 101 (19 Apr 2018 18:29) (101 - 101)  BP: 175/112 (19 Apr 2018 18:29) (175/112 - 175/112)  BP(mean): --  RR: 20 (19 Apr 2018 18:29) (20 - 20)  SpO2: 98% (19 Apr 2018 18:29) (98% - 98%)  CAPILLARY BLOOD GLUCOSE            GENERAL: NAD, well-groomed,  HEAD:  atraumatic, normocephalic  EYES: sclera anicteric  ENMT: mucous membranes moist  NECK: supple, No JVD  CHEST/LUNG: clear to auscultation bilaterally;    no      rales   ,   no rhonchi,   HEART: normal S1, S2  ABDOMEN: BS+, soft, ND, NT   EXTREMITIES:    no    edema    b/l LEs  NEURO: awake, ,     moves all extremities  SKIN: no     rash    LABS:pending

## 2018-04-19 NOTE — ED PROVIDER NOTE - OBJECTIVE STATEMENT
89 yr old male with PMH of TIA, seizure disorder, former smoker,  A Fib on Xarelto (last dose Sunday),  HTN, HLD, Systolic HF with EF 30-35% in 2016,  prostate cancer- s/p seed implant, BPH, came in today 89 yr old male with PMH of TIA, seizure disorder, former smoker,  A Fib on Xarelto (last dose Sunday),  HTN, HLD, Systolic HF with EF 30-35% in 2016,  prostate cancer- s/p seed implant, BPH, came in today with low abdominal pain and hematuria on and off for couple of weeks ,no fever or chills ,germania nausea ,vomiting   PMD Maya

## 2018-04-19 NOTE — H&P ADULT - HISTORY OF PRESENT ILLNESS
CHIEF COMPLAINT:Patient is a 89y old  Male who presents with a chief complaint of hematuria.    HPI: pt with hx of htn,seizure disorder, sss PAF, s/p ppm who presented to er with c/o gross hematiria last few weeks.  pt was seen by me in the officae and found in rapid a.fib.  no chest pain, sob, dizziness nor lightheadedness.        PAST MEDICAL & SURGICAL HISTORY:  Pacemaker  HF (heart failure)  Seizure  Prostate cancer  BPH (benign prostatic hypertrophy)  PAF (paroxysmal atrial fibrillation)  TIA (Transient Ischemic Attack)  Prostate Ca: s/p seed implant  Hyperlipemia  HTN (Hypertension)  H/O prostate cancer: seed implant  No Past Surgical History      MEDICATIONS  (STANDING):    MEDICATIONS  (PRN):      FAMILY HISTORY:  No pertinent family history in first degree relatives      SOCIAL HISTORY:    [ ] Non-smoker  [ ] Smoker  [ ] Alcohol    Allergies    ACE inhibitors (Other)    Intolerances    	    REVIEW OF SYSTEMS:  CONSTITUTIONAL: No fever, weight loss, or fatigue  EYES: No eye pain, visual disturbances, or discharge  ENT:  No difficulty hearing, tinnitus, vertigo; No sinus or throat pain  NECK: No pain or stiffness  RESPIRATORY: No cough, wheezing, chills or hemoptysis; No Shortness of Breath  CARDIOVASCULAR: No chest pain, palpitations, passing out, dizziness, or leg swelling  GASTROINTESTINAL: No abdominal or epigastric pain. No nausea, vomiting, or hematemesis; No diarrhea or constipation. No melena or hematochezia.  GENITOURINARY: No dysuria, frequency, + hematuria,   NEUROLOGICAL: No headaches, memory loss, loss of strength, numbness, or tremors  SKIN: No itching, burning, rashes, or lesions   LYMPH Nodes: No enlarged glands  ENDOCRINE: No heat or cold intolerance; No hair loss  MUSCULOSKELETAL: No joint pain or swelling; No muscle, back, or extremity pain  PSYCHIATRIC: No depression, anxiety, mood swings, or difficulty sleeping  HEME/LYMPH: No easy bruising, or bleeding gums  ALLERGY AND IMMUNOLOGIC: No hives or eczema	    [ ] All others negative	  [ ] Unable to obtain    PHYSICAL EXAM:  T(C): 36.7 (04-19-18 @ 18:29), Max: 36.7 (04-19-18 @ 18:29)  HR: 101 (04-19-18 @ 18:29) (101 - 101)  BP: 175/112 (04-19-18 @ 18:29) (175/112 - 175/112)  RR: 20 (04-19-18 @ 18:29) (20 - 20)  SpO2: 98% (04-19-18 @ 18:29) (98% - 98%)  Wt(kg): --  I&O's Summary      Appearance: Normal	  HEENT:   Normal oral mucosa, PERRL, EOMI	  Lymphatic: No lymphadenopathy  Cardiovascular: Normal S1 S2, No JVD, No murmurs, + edema  Respiratory: Lungs clear to auscultation	  Psychiatry: A & O x 3, Mood & affect appropriate  Gastrointestinal:  Soft, Non-tender, + BS	  Skin: No rashes, No ecchymoses, No cyanosis	  Neurologic: Non-focal  Extremities: Normal range of motion, No clubbing, cyanosis or edema  Vascular: Peripheral pulses palpable 2+ bilaterally    TELEMETRY: 	    ECG:  	  RADIOLOGY:  OTHER: 	  	  LABS:	 	    CARDIAC MARKERS:                proBNP:   Lipid Profile:   HgA1c:   TSH:       PREVIOUS DIAGNOSTIC TESTING:      < from: Transthoracic Echocardiogram (12.16.13 @ 13:44) >  1. Mild mitral annular calcification, otherwise normal  mitral valve. Mild mitral regurgitation.  2. Normal trileaflet aortic valve. Mild aortic  regurgitation.  3. Mild concentric left ventricular hypertrophy.  4. Hyperdynamic left ventricle.  Ejection fraction is 75%.  5. Moderate diastolic dysfunction (Stage II).  6. Estimated right ventricular systolic pressure equals 56  mm Hg, assuming right atrial pressure equals 10 mm Hg,  consistent with moderate pulmonary hypertension.    < end of copied text >

## 2018-04-19 NOTE — H&P ADULT - ASSESSMENT
pt with ghx of paf in rapid a.fib seen in the office with hematuria  ct abdomen and pelvis with hx of prostate ca  rapid a.fib  continue Metoprolol 100 mg bid  hold ac for bleeding  tsh  urology evaluation

## 2018-04-19 NOTE — ED ADULT NURSE NOTE - OBJECTIVE STATEMENT
89M comes to ED c/o "brown urine" intermittently x1 month. Patient is a&ox4 and does not appear to be in any acute distress. Patient states he has no other symptoms. denies SOB/chest pain/N/V/D/dizziness/abdominal pain/fever/chills/weakness. States urinary frequency at night. Denies any pain. Family brought urine sample- appears red. On exam, soft abdomen, clear lungs, moves all extremities. On eliquis for afib. Family at bedside. Will continue to monitor.

## 2018-04-19 NOTE — ED ADULT NURSE NOTE - PMH
BPH (benign prostatic hypertrophy)    HF (heart failure)    HTN (Hypertension)    Hyperlipemia    Pacemaker    PAF (paroxysmal atrial fibrillation)    Prostate Ca  s/p seed implant  Prostate cancer    Seizure    TIA (Transient Ischemic Attack)

## 2018-04-19 NOTE — ED PROVIDER NOTE - MEDICAL DECISION MAKING DETAILS
89 y old male multiple medical issues on Eliquis presented with hematuria ,will obtain blood work ,ct scan of abdomen and admission to hospital [facility]

## 2018-04-19 NOTE — ED ADULT NURSE NOTE - CHPI ED SYMPTOMS NEG
no burning urination/no nausea/no diarrhea/no fever/no blood in stool/no vomiting/no chills/no dysuria/no abdominal distension

## 2018-04-19 NOTE — CONSULT NOTE ADULT - ASSESSMENT
pt  with cad. htn ca prostae pt  with cad. htn   ca prostae, cardiomyopathy    afib on eliquis, tia  now  with gross hematuria, for past few  weeks per  family   awaiting  ct abdomen'   labs in er, pendong   elevated sbp in  er   on  lopressor 100, bid,  hold  cozaar  urology  eval  hold  eliquis

## 2018-04-20 ENCOUNTER — TRANSCRIPTION ENCOUNTER (OUTPATIENT)
Age: 83
End: 2018-04-20

## 2018-04-20 VITALS
DIASTOLIC BLOOD PRESSURE: 79 MMHG | OXYGEN SATURATION: 98 % | HEART RATE: 77 BPM | RESPIRATION RATE: 18 BRPM | TEMPERATURE: 98 F | SYSTOLIC BLOOD PRESSURE: 149 MMHG

## 2018-04-20 LAB
ANION GAP SERPL CALC-SCNC: 11 MMOL/L — SIGNIFICANT CHANGE UP (ref 5–17)
BUN SERPL-MCNC: 16 MG/DL — SIGNIFICANT CHANGE UP (ref 7–23)
CALCIUM SERPL-MCNC: 9.4 MG/DL — SIGNIFICANT CHANGE UP (ref 8.4–10.5)
CHLORIDE SERPL-SCNC: 102 MMOL/L — SIGNIFICANT CHANGE UP (ref 96–108)
CO2 SERPL-SCNC: 27 MMOL/L — SIGNIFICANT CHANGE UP (ref 22–31)
CREAT SERPL-MCNC: 0.98 MG/DL — SIGNIFICANT CHANGE UP (ref 0.5–1.3)
GLUCOSE SERPL-MCNC: 96 MG/DL — SIGNIFICANT CHANGE UP (ref 70–99)
HCT VFR BLD CALC: 40.3 % — SIGNIFICANT CHANGE UP (ref 39–50)
HGB BLD-MCNC: 13.2 G/DL — SIGNIFICANT CHANGE UP (ref 13–17)
MCHC RBC-ENTMCNC: 29.1 PG — SIGNIFICANT CHANGE UP (ref 27–34)
MCHC RBC-ENTMCNC: 32.8 GM/DL — SIGNIFICANT CHANGE UP (ref 32–36)
MCV RBC AUTO: 89 FL — SIGNIFICANT CHANGE UP (ref 80–100)
PLATELET # BLD AUTO: 223 K/UL — SIGNIFICANT CHANGE UP (ref 150–400)
POTASSIUM SERPL-MCNC: 3 MMOL/L — LOW (ref 3.5–5.3)
POTASSIUM SERPL-SCNC: 3 MMOL/L — LOW (ref 3.5–5.3)
PSA FLD-MCNC: 0.01 NG/ML — SIGNIFICANT CHANGE UP (ref 0–4)
RBC # BLD: 4.53 M/UL — SIGNIFICANT CHANGE UP (ref 4.2–5.8)
RBC # FLD: 14.5 % — SIGNIFICANT CHANGE UP (ref 10.3–14.5)
SODIUM SERPL-SCNC: 140 MMOL/L — SIGNIFICANT CHANGE UP (ref 135–145)
TSH SERPL-MCNC: 4.14 UIU/ML — SIGNIFICANT CHANGE UP (ref 0.27–4.2)
WBC # BLD: 3.77 K/UL — LOW (ref 3.8–10.5)
WBC # FLD AUTO: 3.77 K/UL — LOW (ref 3.8–10.5)

## 2018-04-20 PROCEDURE — 85014 HEMATOCRIT: CPT

## 2018-04-20 PROCEDURE — 74176 CT ABD & PELVIS W/O CONTRAST: CPT

## 2018-04-20 PROCEDURE — 83605 ASSAY OF LACTIC ACID: CPT

## 2018-04-20 PROCEDURE — 82803 BLOOD GASES ANY COMBINATION: CPT

## 2018-04-20 PROCEDURE — 99285 EMERGENCY DEPT VISIT HI MDM: CPT

## 2018-04-20 PROCEDURE — 82435 ASSAY OF BLOOD CHLORIDE: CPT

## 2018-04-20 PROCEDURE — 81001 URINALYSIS AUTO W/SCOPE: CPT

## 2018-04-20 PROCEDURE — 84295 ASSAY OF SERUM SODIUM: CPT

## 2018-04-20 PROCEDURE — 93005 ELECTROCARDIOGRAM TRACING: CPT

## 2018-04-20 PROCEDURE — G0103: CPT

## 2018-04-20 PROCEDURE — 84443 ASSAY THYROID STIM HORMONE: CPT

## 2018-04-20 PROCEDURE — 80048 BASIC METABOLIC PNL TOTAL CA: CPT

## 2018-04-20 PROCEDURE — 80053 COMPREHEN METABOLIC PANEL: CPT

## 2018-04-20 PROCEDURE — 82330 ASSAY OF CALCIUM: CPT

## 2018-04-20 PROCEDURE — 85027 COMPLETE CBC AUTOMATED: CPT

## 2018-04-20 PROCEDURE — 71046 X-RAY EXAM CHEST 2 VIEWS: CPT

## 2018-04-20 PROCEDURE — 82947 ASSAY GLUCOSE BLOOD QUANT: CPT

## 2018-04-20 PROCEDURE — 84132 ASSAY OF SERUM POTASSIUM: CPT

## 2018-04-20 RX ORDER — ASPIRIN/CALCIUM CARB/MAGNESIUM 324 MG
81 TABLET ORAL DAILY
Qty: 0 | Refills: 0 | Status: DISCONTINUED | OUTPATIENT
Start: 2018-04-20 | End: 2018-04-20

## 2018-04-20 RX ORDER — AMIODARONE HYDROCHLORIDE 400 MG/1
200 TABLET ORAL DAILY
Qty: 0 | Refills: 0 | Status: DISCONTINUED | OUTPATIENT
Start: 2018-04-20 | End: 2018-04-20

## 2018-04-20 RX ORDER — POTASSIUM CHLORIDE 20 MEQ
40 PACKET (EA) ORAL EVERY 4 HOURS
Qty: 0 | Refills: 0 | Status: DISCONTINUED | OUTPATIENT
Start: 2018-04-20 | End: 2018-04-20

## 2018-04-20 RX ORDER — ASPIRIN/CALCIUM CARB/MAGNESIUM 324 MG
1 TABLET ORAL
Qty: 0 | Refills: 0 | COMMUNITY
Start: 2018-04-20

## 2018-04-20 RX ORDER — APIXABAN 2.5 MG/1
1 TABLET, FILM COATED ORAL
Qty: 0 | Refills: 0 | COMMUNITY

## 2018-04-20 RX ORDER — METOPROLOL TARTRATE 50 MG
100 TABLET ORAL
Qty: 0 | Refills: 0 | Status: DISCONTINUED | OUTPATIENT
Start: 2018-04-20 | End: 2018-04-20

## 2018-04-20 RX ADMIN — Medication 40 MILLIEQUIVALENT(S): at 13:52

## 2018-04-20 RX ADMIN — Medication 81 MILLIGRAM(S): at 12:34

## 2018-04-20 RX ADMIN — LEVETIRACETAM 500 MILLIGRAM(S): 250 TABLET, FILM COATED ORAL at 06:32

## 2018-04-20 NOTE — PROGRESS NOTE ADULT - SUBJECTIVE AND OBJECTIVE BOX
- Patient seen and examined.  - In summary, patient is a 89 year old man who presented with hematuria/a.fib/htn (2018 18:57)  - Today, patient is without complaints.         *****MEDICATIONS:    MEDICATIONS  (STANDING):  aspirin enteric coated 81 milliGRAM(s) Oral daily  levETIRAcetam 500 milliGRAM(s) Oral two times a day  metoprolol succinate  milliGRAM(s) Oral daily  simvastatin 20 milliGRAM(s) Oral at bedtime  tamsulosin 0.4 milliGRAM(s) Oral at bedtime    MEDICATIONS  (PRN):           ***** REVIEW OF SYSTEM:  GEN: no fever, no chills, no pain  RESP: no SOB, no cough, no sputum  CVS: no chest pain, no palpitations, no edema  GI: no abdominal pain, no nausea, no vomiting, no constipation, no diarrhea  : no dysuria, no frequency  NEURO: no headache, no dizziness  PSYCH: no depression, not anxious  Derm : no itching, no rash         ***** VITAL SIGNS:  T(F): 97.8 (18 @ 07:35), Max: 98.2 (18 @ 21:00)  HR: 84 (18 @ 07:35) (79 - 101)  BP: 149/74 (18 @ 07:35) (138/65 - 175/112)  RR: 18 (18 @ 07:35) (18 - 20)  SpO2: 98% (18 @ 07:35) (96% - 99%)  Wt(kg): --  ,   I&O's Summary           *****PHYSICAL EXAM:  GEN: A&O X 3 , NAD , comfortable  HEENT: NCAT, EOMI, MMM, no icterus  NECK: Supple, No JVD  CVS: S1S2 , regular , No M/R/G appreciated  PULM: CTA B/L,  no W/R/R appreciated  ABD.: soft. non tender, non distended,  bowel sounds present  Extrem: intact pulses , no edema noted  Derm: No rash or ecchymosis noted  PSYCH: normal mood, no depression, not anxious         *****LAB AND IMAGIN.2   3.77  )-----------( 223      ( 2018 07:57 )             40.3               04-20    140  |  102  |  16  ----------------------------<  96  3.0<L>   |  27  |  0.98    Ca    9.4      2018 05:30    TPro  8.1  /  Alb  4.5  /  TBili  0.3  /  DBili  x   /  AST  24  /  ALT  20  /  AlkPhos  77  -19                       Urinalysis Basic - ( 2018 20:31 )    Color: Yellow / Appearance: SL Turbid / S.018 / pH: x  Gluc: x / Ketone: Negative  / Bili: Negative / Urobili: Negative   Blood: x / Protein: 100 mg/dL / Nitrite: Negative   Leuk Esterase: Trace / RBC: >50 /HPF / WBC 2-5 /HPF   Sq Epi: x / Non Sq Epi: x / Bacteria: x      [All pertinent recent Imaging/Reports reviewed]    < from: CT Abdomen and Pelvis No Cont (18 @ 19:27) >  IMPRESSION:   A 2.4 cm 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          < end of copied text >           *****A S S E S S M E N T   A N D   P L A N :    89M with paf, adm with hematuria  ct abdomen and pelvis noted  awaits urology evaluation  rapid a.fib, HR improved  continue Metoprolol   hold ac for bleeding      __________________________  DALILA Robertson D.O.
hs  hematuiria, no cp/sob    REVIEW OF SYSTEMS:  CONSTITUTIONAL: No weakness,  no   fevers      RESPIRATORY:  No    shortness of breath  CARDIOVASCULAR: No chest pain,   GASTROINTESTINAL: No abdominal  pain  NEUROLOGICAL: No  focal  weakness    MEDICATIONS  (STANDING):  levETIRAcetam 500 milliGRAM(s) Oral two times a day  metoprolol succinate  milliGRAM(s) Oral daily  simvastatin 20 milliGRAM(s) Oral at bedtime  tamsulosin 0.4 milliGRAM(s) Oral at bedtime    MEDICATIONS  (PRN):      Vital Signs Last 24 Hrs  T(C): 36.6 (2018 07:35), Max: 36.8 (2018 21:00)  T(F): 97.8 (2018 07:35), Max: 98.2 (2018 21:00)  HR: 84 (2018 07:35) (79 - 101)  BP: 149/74 (2018 07:35) (138/65 - 175/112)  BP(mean): --  RR: 18 (2018 07:35) (18 - 20)  SpO2: 98% (2018 07:35) (96% - 99%)  CAPILLARY BLOOD GLUCOSE        I&O's Summary      PHYSICAL EXAM:  HEAD:  Atraumatic, Normocephalic  NECK: Supple, No JVD  CHEST/LUNG:   no     rales,     no,    rhonchi  HEART: Regular rate and rhythm;         murmur  ABDOMEN: Soft, Nontender, ;   EXTREMITIES:  less      edema  NEUROLOGY:  alert    LABS:                        13.2   3.77  )-----------( 223      ( 2018 07:57 )             40.3     04-20    140  |  102  |  16  ----------------------------<  96  3.0<L>   |  27  |  0.98    Ca    9.4      2018 05:30    TPro  8.1  /  Alb  4.5  /  TBili  0.3  /  DBili  x   /  AST  24  /  ALT  20  /  AlkPhos  77  04-19          Urinalysis Basic - ( 2018 20:31 )    Color: Yellow / Appearance: SL Turbid / S.018 / pH: x  Gluc: x / Ketone: Negative  / Bili: Negative / Urobili: Negative   Blood: x / Protein: 100 mg/dL / Nitrite: Negative   Leuk Esterase: Trace / RBC: >50 /HPF / WBC 2-5 /HPF   Sq Epi: x / Non Sq Epi: x / Bacteria: x           @ 19:34  3.5  18              Consultant(s) Notes Reviewed:      Care Discussed with Consultants/Other Providers:

## 2018-04-20 NOTE — DISCHARGE NOTE ADULT - SECONDARY DIAGNOSIS.
PAF (paroxysmal atrial fibrillation) Essential hypertension Prostate cancer Chronic systolic heart failure

## 2018-04-20 NOTE — DISCHARGE NOTE ADULT - CARE PROVIDERS DIRECT ADDRESSES
[email or web address]

## 2018-04-20 NOTE — DISCHARGE NOTE ADULT - CARE PLAN
Principal Discharge DX:	Hematuria  Goal:	follow up with urology  Assessment and plan of treatment:	call your urologist for follow up appointment within 1 week  return to emergency if worsens fever, difficulty breathing, chest pain  Secondary Diagnosis:	PAF (paroxysmal atrial fibrillation)  Goal:	rate control  Assessment and plan of treatment:	follow up with cardiology within 1 week  Secondary Diagnosis:	Essential hypertension  Goal:	/80  Assessment and plan of treatment:	continue the above meds and follow up with your doctors  Secondary Diagnosis:	Prostate cancer  Assessment and plan of treatment:	follow up with urology  Secondary Diagnosis:	Chronic systolic heart failure  Goal:	no difficulty breathing  Assessment and plan of treatment:	take your meds, follow up with cardiology  take your weight daily, if increases by 3 pounds then call your physician

## 2018-04-20 NOTE — PROGRESS NOTE ADULT - ASSESSMENT
pt  with cad. htn   ca prostae, cardiomyopathy    afib on eliquis, tia  now  with gross hematuria, for past few  weeks per  family   elevated sbp in  er, better now   on  lopressor 100, bid,  hold  cozaar  urology  dannaal called  hold  eliquis  ct abdomen with bladder mass   awaiting hematuria   to resolve   will need  cystoscopy/ bx  on  asa

## 2018-04-20 NOTE — DISCHARGE NOTE ADULT - PLAN OF CARE
follow up with urology call your urologist for follow up appointment within 1 week  return to emergency if worsens fever, difficulty breathing, chest pain rate control follow up with cardiology within 1 week /80 continue the above meds and follow up with your doctors no difficulty breathing take your meds, follow up with cardiology  take your weight daily, if increases by 3 pounds then call your physician

## 2018-04-20 NOTE — DISCHARGE NOTE ADULT - MEDICATION SUMMARY - MEDICATIONS TO STOP TAKING
I will STOP taking the medications listed below when I get home from the hospital:    Metoprolol Succinate  mg oral tablet, extended release  -- 1 tab(s) by mouth once a day   -- It is very important that you take or use this exactly as directed.  Do not skip doses or discontinue unless directed by your doctor.  May cause drowsiness.  Alcohol may intensify this effect.  Use care when operating dangerous machinery.  Some non-prescription drugs may aggravate your condition.  Read all labels carefully.  If a warning appears, check with your doctor before taking.  Swallow whole.  Do not crush.  Take with food or milk.  This drug may impair the ability to drive or operate machinery.  Use care until you become familiar with its effects.    Eliquis 2.5 mg oral tablet  -- 1 tab(s) by mouth 2 times a day

## 2018-04-20 NOTE — DISCHARGE NOTE ADULT - PATIENT PORTAL LINK FT
You can access the [facility], offered by [facility], by registering with the following website: [email or web address]

## 2018-04-20 NOTE — DISCHARGE NOTE ADULT - CARE PROVIDER_API CALL
Derek Ibarra (MD), Urology  [address]  [address]  [place] [address]  Phone: [phone]  Fax: [phone]    Clarke Trejo (DO), Cardiology Medicine  [address] [place] [address]  [address]  [place] [address]  Phone: [phone]  Fax: [phone]

## 2018-04-20 NOTE — DISCHARGE NOTE ADULT - HOSPITAL COURSE
89 year old Male with PMH of HTN, HLD, TIA, seizure disorder, former smoker,  A Fib on Xarelto, Systolic HF with EF 30-35%, prostate cancer- s/p seed implant, BPH, presented to the ED with lower abdominal pain and hematuria on and off for couple of weeks. Patient admitted cardiologist with AF , elevated BP.   Cardiology , medicine and  consulted.  Blood pressure improved. Patient to continue Metoprolol 100 mg BID dosing and Amiodarone.   Pt's urologist consulted and will follow up outpatient  Eliquis was held. It will not be re started as per cardiology. Patient will be on ASA 81 mg.   CT abdomen with bladder mass, Awaiting hematuria  to resolve then pt will need  cystoscopy/ bx  VSS, H/H stable.   Discharge home 89 year old Male with PMH of HTN, HLD, TIA, seizure disorder, former smoker,  A Fib on Xarelto, Systolic HF with EF 30-35%, prostate cancer- s/p seed implant, BPH, presented to the ED with lower abdominal pain and hematuria on and off for couple of weeks. Patient admitted cardiologist with AF , elevated BP.   Cardiology , medicine and  consulted.  Blood pressure improved. Patient to continue Metoprolol 100 mg BID dosing and Amiodarone.   Pt's urologist consulted and will follow up outpatient  Eliquis was held. It will not be re started as per cardiology. Patient will be on ASA 81 mg.   CT abdomen with bladder mass, Awaiting hematuria  to resolve then pt will need  cystoscopy/ bx  VSS, H/H stable.   Discharge home, f/u

## 2018-04-20 NOTE — DISCHARGE NOTE ADULT - MEDICATION SUMMARY - MEDICATIONS TO TAKE
I will START or STAY ON the medications listed below when I get home from the hospital:    aspirin 81 mg oral delayed release tablet  -- 1 tab(s) by mouth once a day  -- Indication: For atrial fib    tamsulosin 0.4 mg oral capsule  -- 1 cap(s) by mouth once a day (at bedtime)  -- Indication: For bph    amiodarone 200 mg oral tablet  -- 1 tab(s) by mouth once a day  -- Indication: For a fib    levETIRAcetam 500 mg oral tablet  -- 1 tab(s) by mouth 2 times a day  -- Indication: For seizure    simvastatin 20 mg oral tablet  -- 1 tab(s) by mouth once a day (at bedtime)  -- Indication: For Hyperlipidemia    Metoprolol Succinate  mg oral tablet, extended release  -- 1 tab(s) by mouth 2 times a day  -- It is very important that you take or use this exactly as directed.  Do not skip doses or discontinue unless directed by your doctor.  May cause drowsiness.  Alcohol may intensify this effect.  Use care when operating dangerous machinery.  Some non-prescription drugs may aggravate your condition.  Read all labels carefully.  If a warning appears, check with your doctor before taking.  Swallow whole.  Do not crush.  Take with food or milk.  This drug may impair the ability to drive or operate machinery.  Use care until you become familiar with its effects.    -- Indication: For atrial fib

## 2018-04-24 PROBLEM — Z95.0 PRESENCE OF CARDIAC PACEMAKER: Chronic | Status: ACTIVE | Noted: 2018-04-19

## 2018-04-27 ENCOUNTER — OUTPATIENT (OUTPATIENT)
Dept: OUTPATIENT SERVICES | Facility: HOSPITAL | Age: 83
LOS: 1 days | End: 2018-04-27

## 2018-04-27 ENCOUNTER — APPOINTMENT (OUTPATIENT)
Dept: CT IMAGING | Facility: IMAGING CENTER | Age: 83
End: 2018-04-27
Payer: MEDICARE

## 2018-04-27 ENCOUNTER — OUTPATIENT (OUTPATIENT)
Dept: OUTPATIENT SERVICES | Facility: HOSPITAL | Age: 83
LOS: 1 days | End: 2018-04-27
Payer: MEDICARE

## 2018-04-27 DIAGNOSIS — Z85.46 PERSONAL HISTORY OF MALIGNANT NEOPLASM OF PROSTATE: Chronic | ICD-10-CM

## 2018-04-27 DIAGNOSIS — Z00.8 ENCOUNTER FOR OTHER GENERAL EXAMINATION: ICD-10-CM

## 2018-04-27 PROCEDURE — 74178 CT ABD&PLV WO CNTR FLWD CNTR: CPT

## 2018-04-27 PROCEDURE — 74178 CT ABD&PLV WO CNTR FLWD CNTR: CPT | Mod: 26

## 2018-05-07 ENCOUNTER — INPATIENT (INPATIENT)
Facility: HOSPITAL | Age: 83
LOS: 2 days | Discharge: HOME | DRG: 293 | End: 2018-05-10
Attending: INTERNAL MEDICINE | Admitting: INTERNAL MEDICINE
Payer: MEDICARE

## 2018-05-07 VITALS
DIASTOLIC BLOOD PRESSURE: 110 MMHG | RESPIRATION RATE: 18 BRPM | OXYGEN SATURATION: 99 % | HEART RATE: 93 BPM | TEMPERATURE: 98 F | SYSTOLIC BLOOD PRESSURE: 168 MMHG | WEIGHT: 175.05 LBS

## 2018-05-07 DIAGNOSIS — I50.9 HEART FAILURE, UNSPECIFIED: ICD-10-CM

## 2018-05-07 DIAGNOSIS — Z85.46 PERSONAL HISTORY OF MALIGNANT NEOPLASM OF PROSTATE: Chronic | ICD-10-CM

## 2018-05-07 LAB
ALBUMIN SERPL ELPH-MCNC: 4.1 G/DL — SIGNIFICANT CHANGE UP (ref 3.3–5)
ALP SERPL-CCNC: 62 U/L — SIGNIFICANT CHANGE UP (ref 40–120)
ALT FLD-CCNC: 16 U/L — SIGNIFICANT CHANGE UP (ref 10–45)
ANION GAP SERPL CALC-SCNC: 16 MMOL/L — SIGNIFICANT CHANGE UP (ref 5–17)
APTT BLD: 30.1 SEC — SIGNIFICANT CHANGE UP (ref 27.5–37.4)
AST SERPL-CCNC: 36 U/L — SIGNIFICANT CHANGE UP (ref 10–40)
BASE EXCESS BLDV CALC-SCNC: 2.1 MMOL/L — HIGH (ref -2–2)
BASOPHILS # BLD AUTO: 0.1 K/UL — SIGNIFICANT CHANGE UP (ref 0–0.2)
BASOPHILS NFR BLD AUTO: 1.5 % — SIGNIFICANT CHANGE UP (ref 0–2)
BILIRUB SERPL-MCNC: 0.5 MG/DL — SIGNIFICANT CHANGE UP (ref 0.2–1.2)
BLD GP AB SCN SERPL QL: NEGATIVE — SIGNIFICANT CHANGE UP
BUN SERPL-MCNC: 20 MG/DL — SIGNIFICANT CHANGE UP (ref 7–23)
CA-I SERPL-SCNC: 1.04 MMOL/L — LOW (ref 1.12–1.3)
CALCIUM SERPL-MCNC: 9.6 MG/DL — SIGNIFICANT CHANGE UP (ref 8.4–10.5)
CHLORIDE BLDV-SCNC: 101 MMOL/L — SIGNIFICANT CHANGE UP (ref 96–108)
CHLORIDE SERPL-SCNC: 100 MMOL/L — SIGNIFICANT CHANGE UP (ref 96–108)
CK MB BLD-MCNC: 3.2 % — SIGNIFICANT CHANGE UP (ref 0–3.5)
CK MB CFR SERPL CALC: 3.7 NG/ML — SIGNIFICANT CHANGE UP (ref 0–6.7)
CK SERPL-CCNC: 115 U/L — SIGNIFICANT CHANGE UP (ref 30–200)
CO2 BLDV-SCNC: 30 MMOL/L — SIGNIFICANT CHANGE UP (ref 22–30)
CO2 SERPL-SCNC: 24 MMOL/L — SIGNIFICANT CHANGE UP (ref 22–31)
CREAT SERPL-MCNC: 1.23 MG/DL — SIGNIFICANT CHANGE UP (ref 0.5–1.3)
EOSINOPHIL # BLD AUTO: 0.1 K/UL — SIGNIFICANT CHANGE UP (ref 0–0.5)
EOSINOPHIL NFR BLD AUTO: 2.4 % — SIGNIFICANT CHANGE UP (ref 0–6)
GAS PNL BLDV: 131 MMOL/L — LOW (ref 136–145)
GAS PNL BLDV: SIGNIFICANT CHANGE UP
GAS PNL BLDV: SIGNIFICANT CHANGE UP
GLUCOSE BLDV-MCNC: 100 MG/DL — HIGH (ref 70–99)
GLUCOSE SERPL-MCNC: 103 MG/DL — HIGH (ref 70–99)
HCO3 BLDV-SCNC: 28 MMOL/L — SIGNIFICANT CHANGE UP (ref 21–29)
HCT VFR BLD CALC: 45.7 % — SIGNIFICANT CHANGE UP (ref 39–50)
HCT VFR BLDA CALC: 46 % — SIGNIFICANT CHANGE UP (ref 39–50)
HGB BLD CALC-MCNC: 15.1 G/DL — SIGNIFICANT CHANGE UP (ref 13–17)
HGB BLD-MCNC: 15.3 G/DL — SIGNIFICANT CHANGE UP (ref 13–17)
INR BLD: 1.2 RATIO — HIGH (ref 0.88–1.16)
LACTATE BLDV-MCNC: 2.1 MMOL/L — HIGH (ref 0.7–2)
LG PLATELETS BLD QL AUTO: SIGNIFICANT CHANGE UP
LYMPHOCYTES # BLD AUTO: 2.2 K/UL — SIGNIFICANT CHANGE UP (ref 1–3.3)
LYMPHOCYTES # BLD AUTO: 49 % — HIGH (ref 13–44)
MCHC RBC-ENTMCNC: 30.6 PG — SIGNIFICANT CHANGE UP (ref 27–34)
MCHC RBC-ENTMCNC: 33.5 GM/DL — SIGNIFICANT CHANGE UP (ref 32–36)
MCV RBC AUTO: 91.5 FL — SIGNIFICANT CHANGE UP (ref 80–100)
MONOCYTES # BLD AUTO: 0.6 K/UL — SIGNIFICANT CHANGE UP (ref 0–0.9)
MONOCYTES NFR BLD AUTO: 12.7 % — SIGNIFICANT CHANGE UP (ref 2–14)
NEUTROPHILS # BLD AUTO: 1.5 K/UL — LOW (ref 1.8–7.4)
NEUTROPHILS NFR BLD AUTO: 34.4 % — LOW (ref 43–77)
PCO2 BLDV: 52 MMHG — HIGH (ref 35–50)
PH BLDV: 7.36 — SIGNIFICANT CHANGE UP (ref 7.35–7.45)
PLAT MORPH BLD: NORMAL — SIGNIFICANT CHANGE UP
PLATELET # BLD AUTO: 233 K/UL — SIGNIFICANT CHANGE UP (ref 150–400)
PO2 BLDV: 24 MMHG — LOW (ref 25–45)
POTASSIUM BLDV-SCNC: 11.1 MMOL/L — CRITICAL HIGH (ref 3.5–5)
POTASSIUM SERPL-MCNC: 4.6 MMOL/L — SIGNIFICANT CHANGE UP (ref 3.5–5.3)
POTASSIUM SERPL-SCNC: 4.6 MMOL/L — SIGNIFICANT CHANGE UP (ref 3.5–5.3)
PROT SERPL-MCNC: 7.9 G/DL — SIGNIFICANT CHANGE UP (ref 6–8.3)
PROTHROM AB SERPL-ACNC: 13 SEC — HIGH (ref 9.8–12.7)
RBC # BLD: 4.99 M/UL — SIGNIFICANT CHANGE UP (ref 4.2–5.8)
RBC # FLD: 13.9 % — SIGNIFICANT CHANGE UP (ref 10.3–14.5)
RBC BLD AUTO: SIGNIFICANT CHANGE UP
RH IG SCN BLD-IMP: POSITIVE — SIGNIFICANT CHANGE UP
SAO2 % BLDV: 33 % — LOW (ref 67–88)
SODIUM SERPL-SCNC: 140 MMOL/L — SIGNIFICANT CHANGE UP (ref 135–145)
TROPONIN T SERPL-MCNC: <0.01 NG/ML — SIGNIFICANT CHANGE UP (ref 0–0.06)
WBC # BLD: 4.4 K/UL — SIGNIFICANT CHANGE UP (ref 3.8–10.5)
WBC # FLD AUTO: 4.4 K/UL — SIGNIFICANT CHANGE UP (ref 3.8–10.5)

## 2018-05-07 PROCEDURE — 93010 ELECTROCARDIOGRAM REPORT: CPT

## 2018-05-07 PROCEDURE — 71045 X-RAY EXAM CHEST 1 VIEW: CPT | Mod: 26

## 2018-05-07 PROCEDURE — 93308 TTE F-UP OR LMTD: CPT | Mod: 26

## 2018-05-07 PROCEDURE — 99285 EMERGENCY DEPT VISIT HI MDM: CPT | Mod: 25

## 2018-05-07 RX ORDER — FUROSEMIDE 40 MG
20 TABLET ORAL
Qty: 0 | Refills: 0 | Status: DISCONTINUED | OUTPATIENT
Start: 2018-05-07 | End: 2018-05-10

## 2018-05-07 RX ORDER — TAMSULOSIN HYDROCHLORIDE 0.4 MG/1
0.4 CAPSULE ORAL AT BEDTIME
Qty: 0 | Refills: 0 | Status: DISCONTINUED | OUTPATIENT
Start: 2018-05-07 | End: 2018-05-10

## 2018-05-07 RX ORDER — LEVETIRACETAM 250 MG/1
500 TABLET, FILM COATED ORAL
Qty: 0 | Refills: 0 | Status: DISCONTINUED | OUTPATIENT
Start: 2018-05-07 | End: 2018-05-10

## 2018-05-07 RX ORDER — AMIODARONE HYDROCHLORIDE 400 MG/1
200 TABLET ORAL DAILY
Qty: 0 | Refills: 0 | Status: DISCONTINUED | OUTPATIENT
Start: 2018-05-07 | End: 2018-05-10

## 2018-05-07 RX ORDER — ASPIRIN/CALCIUM CARB/MAGNESIUM 324 MG
81 TABLET ORAL DAILY
Qty: 0 | Refills: 0 | Status: DISCONTINUED | OUTPATIENT
Start: 2018-05-07 | End: 2018-05-10

## 2018-05-07 RX ORDER — SIMVASTATIN 20 MG/1
20 TABLET, FILM COATED ORAL AT BEDTIME
Qty: 0 | Refills: 0 | Status: DISCONTINUED | OUTPATIENT
Start: 2018-05-07 | End: 2018-05-10

## 2018-05-07 RX ORDER — METOPROLOL TARTRATE 50 MG
100 TABLET ORAL DAILY
Qty: 0 | Refills: 0 | Status: DISCONTINUED | OUTPATIENT
Start: 2018-05-07 | End: 2018-05-10

## 2018-05-07 RX ORDER — FUROSEMIDE 40 MG
40 TABLET ORAL ONCE
Qty: 0 | Refills: 0 | Status: COMPLETED | OUTPATIENT
Start: 2018-05-07 | End: 2018-05-07

## 2018-05-07 RX ADMIN — LEVETIRACETAM 500 MILLIGRAM(S): 250 TABLET, FILM COATED ORAL at 22:27

## 2018-05-07 RX ADMIN — TAMSULOSIN HYDROCHLORIDE 0.4 MILLIGRAM(S): 0.4 CAPSULE ORAL at 22:20

## 2018-05-07 RX ADMIN — SIMVASTATIN 20 MILLIGRAM(S): 20 TABLET, FILM COATED ORAL at 22:20

## 2018-05-07 RX ADMIN — Medication 40 MILLIGRAM(S): at 19:17

## 2018-05-07 NOTE — H&P ADULT - ASSESSMENT
pt with new onset chf  chronic a.fib can not be ac with hematuria and bladder tumor  tele  continue beta blocker  transfuse as need   iv lasix

## 2018-05-07 NOTE — CONSULT NOTE ADULT - SUBJECTIVE AND OBJECTIVE BOX
HPI:pt  sent to  er  for  sob   recently dx . ca bladder, s/p bx  h/o tia, afib,  cardiomypaothy      REVIEW OF SYSTEMS:  CONSTITUTIONAL: No weakness,  no   fevers      RESPIRATORY:  No    shortness of breath  , no  wheezing  CARDIOVASCULAR: No chest pain,   no  palpitations, no  cough  GASTROINTESTINAL: No abdominal  pain, no  vomiting, no  diarrhea  NEUROLOGICAL: No  focal  weakness    Allergies    ACE inhibitors (Other)    Intolerances        CAPILLARY BLOOD GLUCOSE        I&O's Summary      Vital Signs Last 24 Hrs  T(C): 36.8 (07 May 2018 19:15), Max: 36.8 (07 May 2018 19:15)  T(F): 98.2 (07 May 2018 19:15), Max: 98.2 (07 May 2018 19:15)  HR: 94 (07 May 2018 19:15) (72 - 94)  BP: 145/96 (07 May 2018 19:15) (145/96 - 168/110)  BP(mean): --  RR: 18 (07 May 2018 19:15) (16 - 18)  SpO2: 95% (07 May 2018 19:15) (95% - 99%)  PHYSICAL EXAM:  GENERAL: NAD,   HEAD:  Atraumatic, Normocephalic  EYES: EOMI,  NECK: Supple, No JVD  CHEST/LUNG: Clear to auscultation bilaterally; No wheeze  HEART: Regular rate and rhythm;        murmur  ABDOMEN: Soft, Nontender,   EXTREMITIES:         edema  NEUROLOGY:  alert    MEDICATIONS  (STANDING):    MEDICATIONS  (PRN):    LABS:                        15.3   4.4   )-----------( 233      ( 07 May 2018 17:49 )             45.7     05-07    140  |  100  |  20  ----------------------------<  103<H>  4.6   |  24  |  1.23    Ca    9.6      07 May 2018 17:49    TPro  7.9  /  Alb  4.1  /  TBili  0.5  /  DBili  x   /  AST  36  /  ALT  16  /  AlkPhos  62  05-07    PT/INR - ( 07 May 2018 17:49 )   PT: 13.0 sec;   INR: 1.20 ratio         PTT - ( 07 May 2018 17:49 )  PTT:30.1 sec  CARDIAC MARKERS ( 07 May 2018 17:49 )  x     / <0.01 ng/mL / 115 U/L / x     / 3.7 ng/mL            05-07 @ 17:49  11.1  24      Thyroid Stimulating Hormone, Serum: 4.14 uIU/mL (04-20 @ 08:14)          Consultant(s) Notes Reviewed:      Care Discussed with Consultants/Other Providers:  Plan:

## 2018-05-07 NOTE — ED PROVIDER NOTE - OTHER FINDINGS
qtc 601; QT prolonged, similar but more pronounced from recent ecg.  left anterior fascicular block unchanged.

## 2018-05-07 NOTE — ED PROVIDER NOTE - ATTENDING CONTRIBUTION TO CARE
I have examined and evaluated this patient with the above resident or PA, and agree with the documented clinical history, exam and plan.   Briefly: : 89y/o M with h/o HTN CAD CHF (EF ~30% previously) ppm, pAFib, seizure d/o, presenting with worsening SOB.  POCUS showed b/l pleural effusions and pulmonary edema with poor LV systolic function; ilkely symptoms related to acute on chronic systolic heart failure.  will admit to medicine for further management.

## 2018-05-07 NOTE — ED PROCEDURE NOTE - PROCEDURE ADDITIONAL DETAILS
POCUS: Emergency Department Focused Ultrasound performed at patient's bedside.  The complete report will be available in PACS.   b/l pleural effusions  b/l b lines in anterior lung fields  global lv hypokinesis

## 2018-05-07 NOTE — CONSULT NOTE ADULT - ASSESSMENT
pt with hx of paf ,  cardiomyopathy, ca bladder  continue Metoprolol 100 mg bid  hold ac   urology evaluation  lasix.  tele,   see card lee ann   spoke with family

## 2018-05-07 NOTE — ED PROVIDER NOTE - MEDICAL DECISION MAKING DETAILS
88y/o M with h/o HTN CAD CHF (EF ~30% previously) ppm, pAFib, seizure d/o, presenting with worsening SOB.  POCUS showed b/l pleural effusions and pulmonary edema with poor LV systolic function; ilkely symptoms related to acute on chronic systolic heart failure.  will admit to medicine for further management.

## 2018-05-07 NOTE — ED ADULT NURSE REASSESSMENT NOTE - NS ED NURSE REASSESS COMMENT FT1
Report received from Zoe PICHARDO, [place]. Pt. is awake, watching TV. awaiting bed. family at bedside. VSS. Safety maintained. Will continue to monitor.

## 2018-05-07 NOTE — ED ADULT NURSE NOTE - OBJECTIVE STATEMENT
89 year old male alert and oriented x 4 came to the ED c/o shortness of breath for 3 weeks and intermittent chest discomfort.  Patient said he has shortness of breath with exertion that is improved with rest and intermittent sternal non-radiating chest discomfort.  Patient was on Eliquis for AF till 2 weeks ago when it was discontinued because blood was seen in his urine.  Patient is still on ASA.  Patient placed on CM in paced/AF rhythm.  Patient denies; fevers, pain or burning on urination, N/V.  EKG done at bedside.

## 2018-05-07 NOTE — ED PROVIDER NOTE - CARE PLAN
Principal Discharge DX:	CHF (congestive heart failure) Principal Discharge DX:	CHF (congestive heart failure)  Secondary Diagnosis:	SOB (shortness of breath)

## 2018-05-07 NOTE — H&P ADULT - HISTORY OF PRESENT ILLNESS
CHIEF COMPLAINT:Patient is a 89y old  Male who presents with a chief complaint of sob/hematuria    HPI: pt is a 88 yo male with hx of htn ,a.fib on no ac who was seen in the office with sob/cameron .  pt was recently admitted with hematuria and bladder tumor.  no fever, no chest pain.      PAST MEDICAL & SURGICAL HISTORY:  Pacemaker  HF (heart failure)  Seizure  Prostate cancer  BPH (benign prostatic hypertrophy)  PAF (paroxysmal atrial fibrillation)  TIA (Transient Ischemic Attack)  Prostate Ca: s/p seed implant  Hyperlipemia  HTN (Hypertension)  H/O prostate cancer: seed implant  No Past Surgical History      MEDICATIONS  (STANDING):    MEDICATIONS  (PRN):      FAMILY HISTORY:  No pertinent family history in first degree relatives      SOCIAL HISTORY:    [ ] Non-smoker  [ ] Smoker  [ ] Alcohol    Allergies    ACE inhibitors (Other)    Intolerances    	    REVIEW OF SYSTEMS:  CONSTITUTIONAL: No fever, weight loss, or fatigue  EYES: No eye pain, visual disturbances, or discharge  ENT:  No difficulty hearing, tinnitus, vertigo; No sinus or throat pain  NECK: No pain or stiffness  RESPIRATORY: No cough, wheezing, chills or hemoptysis; + Shortness of Breath  CARDIOVASCULAR: No chest pain, palpitations, passing out, dizziness, or leg swelling  GASTROINTESTINAL: No abdominal or epigastric pain. No nausea, vomiting, or hematemesis; No diarrhea or constipation. No melena or hematochezia.  GENITOURINARY: No dysuria, frequency, hematuria, or incontinence  NEUROLOGICAL: No headaches, memory loss, loss of strength, numbness, or tremors  SKIN: No itching, burning, rashes, or lesions   LYMPH Nodes: No enlarged glands  ENDOCRINE: No heat or cold intolerance; No hair loss  MUSCULOSKELETAL: No joint pain or swelling; No muscle, back, or extremity pain  PSYCHIATRIC: No depression, anxiety, mood swings, or difficulty sleeping  HEME/LYMPH: No easy bruising, or bleeding gums  ALLERGY AND IMMUNOLOGIC: No hives or eczema	    [ ] All others negative	  [ ] Unable to obtain    PHYSICAL EXAM:  T(C): 36.8 (05-07-18 @ 19:15), Max: 36.8 (05-07-18 @ 19:15)  HR: 94 (05-07-18 @ 19:15) (72 - 94)  BP: 145/96 (05-07-18 @ 19:15) (145/96 - 168/110)  RR: 18 (05-07-18 @ 19:15) (16 - 18)  SpO2: 95% (05-07-18 @ 19:15) (95% - 99%)  Wt(kg): --  I&O's Summary      Appearance: Normal	  HEENT:   Normal oral mucosa, PERRL, EOMI	  Lymphatic: No lymphadenopathy  Cardiovascular: Normal S1 S2, No JVD, No murmurs, No edema  Respiratory: decrease bs  Psychiatry: A & O x 3, Mood & affect appropriate  Gastrointestinal:  Soft, Non-tender, + BS	  Skin: No rashes, No ecchymoses, No cyanosis	  Neurologic: Non-focal  Extremities: Normal range of motion, No clubbing, cyanosis or edema  Vascular: Peripheral pulses palpable 2+ bilaterally    TELEMETRY: 	    ECG:  	  RADIOLOGY:  OTHER: 	  	  LABS:	 	    CARDIAC MARKERS:  CARDIAC MARKERS ( 07 May 2018 17:49 )  x     / <0.01 ng/mL / 115 U/L / x     / 3.7 ng/mL                              15.3   4.4   )-----------( 233      ( 07 May 2018 17:49 )             45.7     05-07    140  |  100  |  20  ----------------------------<  103<H>  4.6   |  24  |  1.23    Ca    9.6      07 May 2018 17:49    TPro  7.9  /  Alb  4.1  /  TBili  0.5  /  DBili  x   /  AST  36  /  ALT  16  /  AlkPhos  62  05-07    proBNP: Serum Pro-Brain Natriuretic Peptide: 5623 pg/mL (05-07 @ 17:49)    Lipid Profile:   HgA1c:   TSH:   PT/INR - ( 07 May 2018 17:49 )   PT: 13.0 sec;   INR: 1.20 ratio         PTT - ( 07 May 2018 17:49 )  PTT:30.1 sec    PREVIOUS DIAGNOSTIC TESTING:     < from: 12 Lead ECG (04.19.18 @ 21:12) >   ATRIAL FIBRILLATION  LEFT ANTERIOR FASCICULAR BLOCK  CANNOT RULE OUT ANTERIOR INFARCT , AGEUNDETERMINED  PROLONGED QT    < from: Xray Chest 1 View- PORTABLE-Urgent (05.07.18 @ 17:56) >  INTERPRETATION:  small b/l pleural effusions    < end of copied text >

## 2018-05-08 LAB
ANION GAP SERPL CALC-SCNC: 12 MMOL/L — SIGNIFICANT CHANGE UP (ref 5–17)
BUN SERPL-MCNC: 17 MG/DL — SIGNIFICANT CHANGE UP (ref 7–23)
CALCIUM SERPL-MCNC: 9.1 MG/DL — SIGNIFICANT CHANGE UP (ref 8.4–10.5)
CHLORIDE SERPL-SCNC: 102 MMOL/L — SIGNIFICANT CHANGE UP (ref 96–108)
CK MB CFR SERPL CALC: 2.2 NG/ML — SIGNIFICANT CHANGE UP (ref 0–6.7)
CK SERPL-CCNC: 70 U/L — SIGNIFICANT CHANGE UP (ref 30–200)
CO2 SERPL-SCNC: 29 MMOL/L — SIGNIFICANT CHANGE UP (ref 22–31)
CREAT SERPL-MCNC: 1.2 MG/DL — SIGNIFICANT CHANGE UP (ref 0.5–1.3)
GLUCOSE SERPL-MCNC: 156 MG/DL — HIGH (ref 70–99)
HCT VFR BLD CALC: 42.7 % — SIGNIFICANT CHANGE UP (ref 39–50)
HGB BLD-MCNC: 14.4 G/DL — SIGNIFICANT CHANGE UP (ref 13–17)
MCHC RBC-ENTMCNC: 30.6 PG — SIGNIFICANT CHANGE UP (ref 27–34)
MCHC RBC-ENTMCNC: 33.7 GM/DL — SIGNIFICANT CHANGE UP (ref 32–36)
MCV RBC AUTO: 90.9 FL — SIGNIFICANT CHANGE UP (ref 80–100)
PLATELET # BLD AUTO: 207 K/UL — SIGNIFICANT CHANGE UP (ref 150–400)
POTASSIUM SERPL-MCNC: 3.3 MMOL/L — LOW (ref 3.5–5.3)
POTASSIUM SERPL-SCNC: 3.3 MMOL/L — LOW (ref 3.5–5.3)
RBC # BLD: 4.7 M/UL — SIGNIFICANT CHANGE UP (ref 4.2–5.8)
RBC # FLD: 13.7 % — SIGNIFICANT CHANGE UP (ref 10.3–14.5)
SODIUM SERPL-SCNC: 143 MMOL/L — SIGNIFICANT CHANGE UP (ref 135–145)
TROPONIN T SERPL-MCNC: <0.01 NG/ML — SIGNIFICANT CHANGE UP (ref 0–0.06)
TROPONIN T SERPL-MCNC: <0.01 NG/ML — SIGNIFICANT CHANGE UP (ref 0–0.06)
WBC # BLD: 4.3 K/UL — SIGNIFICANT CHANGE UP (ref 3.8–10.5)
WBC # FLD AUTO: 4.3 K/UL — SIGNIFICANT CHANGE UP (ref 3.8–10.5)

## 2018-05-08 RX ORDER — POTASSIUM CHLORIDE 20 MEQ
40 PACKET (EA) ORAL ONCE
Qty: 0 | Refills: 0 | Status: COMPLETED | OUTPATIENT
Start: 2018-05-08 | End: 2018-05-08

## 2018-05-08 RX ADMIN — Medication 20 MILLIGRAM(S): at 17:45

## 2018-05-08 RX ADMIN — SIMVASTATIN 20 MILLIGRAM(S): 20 TABLET, FILM COATED ORAL at 21:25

## 2018-05-08 RX ADMIN — Medication 20 MILLIGRAM(S): at 05:15

## 2018-05-08 RX ADMIN — Medication 40 MILLIEQUIVALENT(S): at 15:12

## 2018-05-08 RX ADMIN — LEVETIRACETAM 500 MILLIGRAM(S): 250 TABLET, FILM COATED ORAL at 17:44

## 2018-05-08 RX ADMIN — AMIODARONE HYDROCHLORIDE 200 MILLIGRAM(S): 400 TABLET ORAL at 05:15

## 2018-05-08 RX ADMIN — TAMSULOSIN HYDROCHLORIDE 0.4 MILLIGRAM(S): 0.4 CAPSULE ORAL at 21:25

## 2018-05-08 RX ADMIN — Medication 81 MILLIGRAM(S): at 12:08

## 2018-05-08 RX ADMIN — Medication 100 MILLIGRAM(S): at 05:15

## 2018-05-08 RX ADMIN — LEVETIRACETAM 500 MILLIGRAM(S): 250 TABLET, FILM COATED ORAL at 05:15

## 2018-05-08 NOTE — CONSULT NOTE ADULT - ASSESSMENT
88 yo with CHF admission.  Known bladder tumor scheduled Wed 5/16 for turbt +/- ureteral stent    If pt is d/c'd he will keep the date as an out pt-otherwise can do as inpatient when cleared cardiac and medical.    Hold a/c-can continue 81 ASA if a/c is required, would use non coumadin therapy

## 2018-05-08 NOTE — PROGRESS NOTE ADULT - ASSESSMENT
pt with hx of paf ,  cardiomyopathy, ca bladder  admitted  with  sob, from acute systolic  chf  continue Metoprolol 100 mg bid, iv lasix  hold ac ,  given h/o hematuria/ ca bladder  urology evaluation   tele,

## 2018-05-08 NOTE — PROGRESS NOTE ADULT - SUBJECTIVE AND OBJECTIVE BOX
les  sob  , .no cp  REVIEW OF SYSTEMS:  CONSTITUTIONAL: No weakness,  no   fevers      RESPIRATORY:  No    shortness of breath  , no  wheezing  CARDIOVASCULAR: No chest pain,   no  palpitations, no  cough  GASTROINTESTINAL: No abdominal  pain, no  vomiting, no  diarrhea  NEUROLOGICAL: No  focal  weakness    MEDICATIONS  (STANDING):  amiodarone    Tablet 200 milliGRAM(s) Oral daily  aspirin enteric coated 81 milliGRAM(s) Oral daily  furosemide   Injectable 20 milliGRAM(s) IV Push two times a day  levETIRAcetam 500 milliGRAM(s) Oral two times a day  metoprolol succinate  milliGRAM(s) Oral daily  simvastatin 20 milliGRAM(s) Oral at bedtime  tamsulosin 0.4 milliGRAM(s) Oral at bedtime    MEDICATIONS  (PRN):      Vital Signs Last 24 Hrs  T(C): 36.7 (08 May 2018 04:54), Max: 36.8 (07 May 2018 19:15)  T(F): 98.1 (08 May 2018 04:54), Max: 98.2 (07 May 2018 19:15)  HR: 72 (08 May 2018 04:54) (72 - 94)  BP: 135/98 (08 May 2018 04:54) (132/73 - 168/110)  BP(mean): --  RR: 17 (08 May 2018 04:54) (16 - 18)  SpO2: 96% (08 May 2018 04:54) (95% - 99%)  CAPILLARY BLOOD GLUCOSE        I&O's Summary    07 May 2018 07:01  -  08 May 2018 07:00  --------------------------------------------------------  IN: 480 mL / OUT: 900 mL / NET: -420 mL        PHYSICAL EXAM:  HEAD:  Atraumatic, Normocephalic  NECK: Supple, No   JVD  CHEST/LUNG:   no     rales,     no,    rhonchi  HEART: Regular rate and rhythm;         murmur  ABDOMEN: Soft, Nontender, ;   EXTREMITIES:        edema  NEUROLOGY:  alert    LABS:                        15.3   4.4   )-----------( 233      ( 07 May 2018 17:49 )             45.7     05-07    140  |  100  |  20  ----------------------------<  103<H>  4.6   |  24  |  1.23    Ca    9.6      07 May 2018 17:49    TPro  7.9  /  Alb  4.1  /  TBili  0.5  /  DBili  x   /  AST  36  /  ALT  16  /  AlkPhos  62  05-07    PT/INR - ( 07 May 2018 17:49 )   PT: 13.0 sec;   INR: 1.20 ratio         PTT - ( 07 May 2018 17:49 )  PTT:30.1 sec  CARDIAC MARKERS ( 08 May 2018 01:50 )  x     / <0.01 ng/mL / x     / x     / x      CARDIAC MARKERS ( 07 May 2018 17:49 )  x     / <0.01 ng/mL / 115 U/L / x     / 3.7 ng/mL            05-07 @ 17:49  11.1  24      Thyroid Stimulating Hormone, Serum: 4.14 uIU/mL (04-20 @ 08:14)          Consultant(s) Notes Reviewed:      Care Discussed with Consultants/Other Providers:

## 2018-05-08 NOTE — CONSULT NOTE ADULT - SUBJECTIVE AND OBJECTIVE BOX
Patient is a 89y old  Male who presents with a chief complaint of chf (07 May 2018 19:39)  Pt known to me and scheduled next Wed for TURBT.  He was seen last week-CT and cysto c/w bladder tumor right wall.  Urine  c/s negative  Now developed CHF and admitted for cardiac eval    also h/o CaP treated with brachytherapy over ten years ago      no fever, no chest pain.  breathing better after course of lasix    PAST MEDICAL & SURGICAL HISTORY:  Pacemaker  HF (heart failure)  Seizure  Prostate cancer  BPH (benign prostatic hypertrophy)  PAF (paroxysmal atrial fibrillation)  TIA (Transient Ischemic Attack)  Prostate Ca: s/p seed implant  Hyperlipemia  HTN (Hypertension)  H/O prostate cancer: seed implant  No Past Surgical History      MEDICATIONS  (STANDING):    MEDICATIONS  (PRN):      FAMILY HISTORY:  No pertinent family history in first degree relatives      SOCIAL HISTORY:    [ ] Non-smoker  [ ] Smoker  [ ] Alcohol    Allergies    ACE inhibitors (Other)    Intolerances    	    REVIEW OF SYSTEMS:  CONSTITUTIONAL: No fever, weight loss, or fatigue  EYES: No eye pain, visual disturbances, or discharge  ENT:  No difficulty hearing, tinnitus, vertigo; No sinus or throat pain  NECK: No pain or stiffness  RESPIRATORY: No cough, wheezing, chills or hemoptysis; + Shortness of Breath  CARDIOVASCULAR: No chest pain, palpitations, passing out, dizziness, or leg swelling  GASTROINTESTINAL: No abdominal or epigastric pain. No nausea, vomiting, or hematemesis; No diarrhea or constipation. No melena or hematochezia.  GENITOURINARY: No dysuria, frequency, hematuria, or incontinence  NEUROLOGICAL: No headaches, memory loss, loss of strength, numbness, or tremors  SKIN: No itching, burning, rashes, or lesions   LYMPH Nodes: No enlarged glands  ENDOCRINE: No heat or cold intolerance; No hair loss  MUSCULOSKELETAL: No joint pain or swelling; No muscle, back, or extremity pain  PSYCHIATRIC: No depression, anxiety, mood swings, or difficulty sleeping  HEME/LYMPH: No easy bruising, or bleeding gums  ALLERGY AND IMMUNOLOGIC: No hives or eczema	    [ ] All others negative	  [ ] Unable to obtain    PHYSICAL EXAM:  T(C): 36.8 (05-07-18 @ 19:15), Max: 36.8 (05-07-18 @ 19:15)  HR: 94 (05-07-18 @ 19:15) (72 - 94)  BP: 145/96 (05-07-18 @ 19:15) (145/96 - 168/110)  RR: 18 (05-07-18 @ 19:15) (16 - 18)  SpO2: 95% (05-07-18 @ 19:15) (95% - 99%)  Wt(kg): --  I&O's Summary      Appearance: Normal	  HEENT:   Normal oral mucosa, PERRL, EOMI	  Lymphatic: No lymphadenopathy    Gastrointestinal:  Soft, Non-tender, + BS	  Skin: No rashes, No ecchymoses, No cyanosis	  Neurologic: Non-focal  Extremities: Normal range of motion, No clubbing, cyanosis or edema  Vascular: Peripheral pulses palpable 2+ bilaterally     	  	  LABS:	 	    CARDIAC MARKERS:  CARDIAC MARKERS ( 07 May 2018 17:49 )  x     / <0.01 ng/mL / 115 U/L / x     / 3.7 ng/mL                              15.3   4.4   )-----------( 233      ( 07 May 2018 17:49 )             45.7     05-07    140  |  100  |  20  ----------------------------<  103<H>  4.6   |  24  |  1.23    Ca    9.6      07 May 2018 17:49    TPro  7.9  /  Alb  4.1  /  TBili  0.5  /  DBili  x   /  AST  36  /  ALT  16  /  AlkPhos  62  05-07    proBNP: Serum Pro-Brain Natriuretic Peptide: 5623 pg/mL (05-07 @ 17:49)    Lipid Profile:   HgA1c:   TSH:   PT/INR - ( 07 May 2018 17:49 )   PT: 13.0 sec;   INR: 1.20 ratio         PTT - ( 07 May 2018 17:49 )  PTT:30.1 sec    PREVIOUS DIAGNOSTIC TESTING:     < from: 12 Lead ECG (04.19.18 @ 21:12) >   ATRIAL FIBRILLATION  LEFT ANTERIOR FASCICULAR BLOCK  CANNOT RULE OUT ANTERIOR INFARCT , AGEUNDETERMINED  PROLONGED QT    < from: Xray Chest 1 View- PORTABLE-Urgent (05.07.18 @ 17:56) >  INTERPRETATION:  small b/l pleural effusions    < end of copied text > (07 May 2018 19:39)      PAST MEDICAL & SURGICAL HISTORY:  Pacemaker  HF (heart failure)  Seizure  Prostate cancer  BPH (benign prostatic hypertrophy)  PAF (paroxysmal atrial fibrillation)  TIA (Transient Ischemic Attack)  Prostate Ca: s/p seed implant  Hyperlipemia  HTN (Hypertension)  H/O prostate cancer: seed implant  No Past Surgical History      REVIEW OF SYSTEMS:    CONSTITUTIONAL: No weakness, fevers or chills  HEENT: No visual changes;  No vertigo or throat pain   ENDO: No sweating, no palpitations  NECK: No pain or stiffness  MUSCULOSKELETAL: No back pain, no joint pain  RESPIRATORY: No cough, wheezing, hemoptysis; No shortness of breath  CARDIOVASCULAR: No chest pain or palpitations  GASTROINTESTINAL: No abdominal or epigastric pain. No nausea, vomiting, or hematemesis; No diarrhea or constipation. No melena or hematochezia.  NEUROLOGICAL: No numbness or weakness  PSYCH: No depression, no mood changes  SKIN: No itching, burning, rashes, or lesions   All other review of systems is negative unless indicated above.    MEDICATIONS  (STANDING):  amiodarone    Tablet 200 milliGRAM(s) Oral daily  aspirin enteric coated 81 milliGRAM(s) Oral daily  furosemide   Injectable 20 milliGRAM(s) IV Push two times a day  levETIRAcetam 500 milliGRAM(s) Oral two times a day  metoprolol succinate  milliGRAM(s) Oral daily  simvastatin 20 milliGRAM(s) Oral at bedtime  tamsulosin 0.4 milliGRAM(s) Oral at bedtime    MEDICATIONS  (PRN):      Allergies    ACE inhibitors (Other)    Intolerances        SOCIAL HISTORY: No illicit drug use    FAMILY HISTORY:  No pertinent family history in first degree relatives      Vital Signs Last 24 Hrs  T(C): 36.6 (08 May 2018 11:32), Max: 36.8 (07 May 2018 19:15)  T(F): 97.9 (08 May 2018 11:32), Max: 98.2 (07 May 2018 19:15)  HR: 80 (08 May 2018 11:32) (72 - 94)  BP: 130/81 (08 May 2018 11:32) (130/81 - 145/96)  BP(mean): --  RR: 18 (08 May 2018 11:32) (17 - 18)  SpO2: 96% (08 May 2018 11:32) (95% - 97%)    PHYSICAL EXAM:    Constitutional: NAD, well-developed  HEENT: GHANSHYAM, EOMI, Normal Hearing, MMM  Neck: No JVD  Back: No CVA tenderness  Respiratory: No accessory respiratory muscle use  Abd: Soft, NT/ND     Extremities: No calf tenderness  Neurological: A/O x 3, no focal deficits  Psychiatric: Normal mood, normal affect  Skin: No rashes    I&O's Summary    07 May 2018 07:01  -  08 May 2018 07:00  --------------------------------------------------------  IN: 480 mL / OUT: 900 mL / NET: -420 mL    08 May 2018 07:01  -  08 May 2018 18:15  --------------------------------------------------------  IN: 1160 mL / OUT: 200 mL / NET: 960 mL        LABS:                        14.4   4.3   )-----------( 207      ( 08 May 2018 12:11 )             42.7     05-08    143  |  102  |  17  ----------------------------<  156<H>  3.3<L>   |  29  |  1.20    Ca    9.1      08 May 2018 12:11    TPro  7.9  /  Alb  4.1  /  TBili  0.5  /  DBili  x   /  AST  36  /  ALT  16  /  AlkPhos  62  05-07    PT/INR - ( 07 May 2018 17:49 )   PT: 13.0 sec;   INR: 1.20 ratio         PTT - ( 07 May 2018 17:49 )  PTT:30.1 sec    Urine Culture: negative-my office    RADIOLOGY & ADDITIONAL STUDIES:

## 2018-05-08 NOTE — PROGRESS NOTE ADULT - SUBJECTIVE AND OBJECTIVE BOX
- Patient seen and examined.  - In summary, patient is a 89 year old man who presented with chf (07 May 2018 19:39)  - Today, patient is without complaints.         *****MEDICATIONS:    MEDICATIONS  (STANDING):  amiodarone    Tablet 200 milliGRAM(s) Oral daily  aspirin enteric coated 81 milliGRAM(s) Oral daily  furosemide   Injectable 20 milliGRAM(s) IV Push two times a day  levETIRAcetam 500 milliGRAM(s) Oral two times a day  metoprolol succinate  milliGRAM(s) Oral daily  simvastatin 20 milliGRAM(s) Oral at bedtime  tamsulosin 0.4 milliGRAM(s) Oral at bedtime    MEDICATIONS  (PRN):           ***** REVIEW OF SYSTEM:  GEN: no fever, no chills, no pain  RESP: no SOB, no cough, no sputum  CVS: no chest pain, no palpitations, no edema  GI: no abdominal pain, no nausea, no vomiting, no constipation, no diarrhea  : no dysuria, no frequency  NEURO: no headache, no dizziness  PSYCH: no depression, not anxious  Derm : no itching, no rash         ***** VITAL SIGNS:  T(F): 98.1 (05-08-18 @ 04:54), Max: 98.2 (05-07-18 @ 19:15)  HR: 72 (05-08-18 @ 04:54) (72 - 94)  BP: 135/98 (05-08-18 @ 04:54) (132/73 - 168/110)  RR: 17 (05-08-18 @ 04:54) (16 - 18)  SpO2: 96% (05-08-18 @ 04:54) (95% - 99%)  Wt(kg): --  ,   I&O's Summary    07 May 2018 07:01  -  08 May 2018 07:00  --------------------------------------------------------  IN: 480 mL / OUT: 900 mL / NET: -420 mL             *****PHYSICAL EXAM:  GEN: A&O X 3 , NAD , comfortable  HEENT: NCAT, EOMI, MMM, no icterus  NECK: Supple, No JVD  CVS: S1S2 , regular , No M/R/G appreciated  PULM: CTA B/L,  no W/R/R appreciated  ABD.: soft. non tender, non distended,  bowel sounds present  Extrem: intact pulses , no edema noted  Derm: No rash or ecchymosis noted  PSYCH: normal mood, no depression, not anxious         *****LAB AND IMAGING:                        15.3   4.4   )-----------( 233      ( 07 May 2018 17:49 )             45.7               05-07    140  |  100  |  20  ----------------------------<  103<H>  4.6   |  24  |  1.23    Ca    9.6      07 May 2018 17:49    TPro  7.9  /  Alb  4.1  /  TBili  0.5  /  DBili  x   /  AST  36  /  ALT  16  /  AlkPhos  62  05-07    PT/INR - ( 07 May 2018 17:49 )   PT: 13.0 sec;   INR: 1.20 ratio         PTT - ( 07 May 2018 17:49 )  PTT:30.1 sec       CARDIAC MARKERS ( 08 May 2018 01:50 )  x     / <0.01 ng/mL / x     / x     / x      CARDIAC MARKERS ( 07 May 2018 17:49 )  x     / <0.01 ng/mL / 115 U/L / x     / 3.7 ng/mL                [All pertinent recent Imaging/Reports reviewed]         *****A S S E S S M E N T   A N D   P L A N :  89M with chf, AF  chronic a.fib  on amio  can not be ac with hematuria and bladder tumor  cont tele  beta blocker per hr/bp  transfuse as need   iv lasix for O=I      __________________________  DALILA Robertson D.O.

## 2018-05-09 ENCOUNTER — TRANSCRIPTION ENCOUNTER (OUTPATIENT)
Age: 83
End: 2018-05-09

## 2018-05-09 LAB
ANION GAP SERPL CALC-SCNC: 15 MMOL/L — SIGNIFICANT CHANGE UP (ref 5–17)
BUN SERPL-MCNC: 18 MG/DL — SIGNIFICANT CHANGE UP (ref 7–23)
CALCIUM SERPL-MCNC: 9.3 MG/DL — SIGNIFICANT CHANGE UP (ref 8.4–10.5)
CHLORIDE SERPL-SCNC: 100 MMOL/L — SIGNIFICANT CHANGE UP (ref 96–108)
CO2 SERPL-SCNC: 28 MMOL/L — SIGNIFICANT CHANGE UP (ref 22–31)
CREAT SERPL-MCNC: 1.07 MG/DL — SIGNIFICANT CHANGE UP (ref 0.5–1.3)
GLUCOSE SERPL-MCNC: 83 MG/DL — SIGNIFICANT CHANGE UP (ref 70–99)
HCT VFR BLD CALC: 42.2 % — SIGNIFICANT CHANGE UP (ref 39–50)
HGB BLD-MCNC: 14.1 G/DL — SIGNIFICANT CHANGE UP (ref 13–17)
MCHC RBC-ENTMCNC: 29.1 PG — SIGNIFICANT CHANGE UP (ref 27–34)
MCHC RBC-ENTMCNC: 33.4 GM/DL — SIGNIFICANT CHANGE UP (ref 32–36)
MCV RBC AUTO: 87 FL — SIGNIFICANT CHANGE UP (ref 80–100)
PLATELET # BLD AUTO: 220 K/UL — SIGNIFICANT CHANGE UP (ref 150–400)
POTASSIUM SERPL-MCNC: 3.2 MMOL/L — LOW (ref 3.5–5.3)
POTASSIUM SERPL-SCNC: 3.2 MMOL/L — LOW (ref 3.5–5.3)
RBC # BLD: 4.85 M/UL — SIGNIFICANT CHANGE UP (ref 4.2–5.8)
RBC # FLD: 14.7 % — HIGH (ref 10.3–14.5)
SODIUM SERPL-SCNC: 143 MMOL/L — SIGNIFICANT CHANGE UP (ref 135–145)
WBC # BLD: 4.97 K/UL — SIGNIFICANT CHANGE UP (ref 3.8–10.5)
WBC # FLD AUTO: 4.97 K/UL — SIGNIFICANT CHANGE UP (ref 3.8–10.5)

## 2018-05-09 PROCEDURE — 93010 ELECTROCARDIOGRAM REPORT: CPT

## 2018-05-09 RX ORDER — POTASSIUM CHLORIDE 20 MEQ
40 PACKET (EA) ORAL EVERY 4 HOURS
Qty: 0 | Refills: 0 | Status: COMPLETED | OUTPATIENT
Start: 2018-05-09 | End: 2018-05-10

## 2018-05-09 RX ADMIN — Medication 40 MILLIEQUIVALENT(S): at 20:24

## 2018-05-09 RX ADMIN — LEVETIRACETAM 500 MILLIGRAM(S): 250 TABLET, FILM COATED ORAL at 05:35

## 2018-05-09 RX ADMIN — AMIODARONE HYDROCHLORIDE 200 MILLIGRAM(S): 400 TABLET ORAL at 05:35

## 2018-05-09 RX ADMIN — Medication 100 MILLIGRAM(S): at 05:35

## 2018-05-09 RX ADMIN — TAMSULOSIN HYDROCHLORIDE 0.4 MILLIGRAM(S): 0.4 CAPSULE ORAL at 21:18

## 2018-05-09 RX ADMIN — SIMVASTATIN 20 MILLIGRAM(S): 20 TABLET, FILM COATED ORAL at 21:18

## 2018-05-09 RX ADMIN — Medication 20 MILLIGRAM(S): at 05:35

## 2018-05-09 RX ADMIN — Medication 81 MILLIGRAM(S): at 11:19

## 2018-05-09 RX ADMIN — Medication 20 MILLIGRAM(S): at 17:21

## 2018-05-09 RX ADMIN — LEVETIRACETAM 500 MILLIGRAM(S): 250 TABLET, FILM COATED ORAL at 17:21

## 2018-05-09 NOTE — PHYSICAL THERAPY INITIAL EVALUATION ADULT - PERTINENT HX OF CURRENT PROBLEM, REHAB EVAL
pt is a 88 yo male admitted to [facility] on 5/7/18 with hx of htn ,a.fib on no ac who was seen in the office with sob/cameron . + bladder tumor, CT abd/pelvis pt is a 88 yo male admitted to [facility] on 5/7/18 with hx of htn ,a.fib on no ac who was seen in the office with sob/cameron . + bladder tumor, CT abd/pelvis 2.7 cm bladder tumor 89 yo male admitted to [facility] on 5/7/18 with hx of htn ,a.fib on no ac who was seen in the office with sob/cameron . + bladder tumor, CT abd/pelvis 2.7 cm bladder tumor

## 2018-05-09 NOTE — DISCHARGE NOTE ADULT - CARE PROVIDER_API CALL
Derek Ibarra (MD), Urology  [address] [facility]  [address]  [place] [address]  Phone: [phone]  Fax: [phone]    Clrake Terjo (DO), Cardiology Medicine  [address] [place] [address]  [address]  [place] [address]  Phone: [phone]  Fax: [phone]

## 2018-05-09 NOTE — DISCHARGE NOTE ADULT - CARE PLAN
Principal Discharge DX:	Acute on chronic systolic (congestive) heart failure  Goal:	No further episodes  Assessment and plan of treatment:	Weigh yourself daily.  If you gain 3lbs in 3 days, or 5lbs in a week call your Health Care Provider.  Do not eat or drink foods containing more than 2000mg of salt (sodium) in your diet every day.  Call your Health Care Provider if you have any swelling or increased swelling in your feet, ankles, and/or stomach.  Take all of your medication as directed.  If you become dizzy call your Health Care Provider.  Secondary Diagnosis:	PAF (paroxysmal atrial fibrillation)  Assessment and plan of treatment:	Atrial fibrillation is the most common heart rhythm problem.  The condition puts you at risk for has stroke and heart attack  It helps if you control your blood pressure, not drink more than 1-2 alcohol drinks per day, cut down on caffeine, getting treatment for over active thyroid gland, and get regular exercise  Call your doctor if you feel your heart racing or beating unusually, chest tightness or pain, lightheaded, faint, shortness of breath especially with exercise  It is important to take your heart medication as prescribed  No blood thinner due to blood in urine and new bladder mass  Secondary Diagnosis:	Bladder mass  Assessment and plan of treatment:	Scheduled on Wed 5/16/18 for Transurethral resection of bladder mass - Call and confirm date with Urologist -Dr. Ibarra  Secondary Diagnosis:	Hypertension, unspecified type  Assessment and plan of treatment:	Take medications for your blood pressure as recommended.  Eat a heart healthy diet that is low in saturated fats and salt, and includes whole grains, fruits, vegetables and lean protein   Exercise regularly (consult with your physician or cardiologist first); maintain a heart healthy weight.   Seek medical help for dizziness, Lightheadedness, Blurry vision, Headache, Chest pain, Shortness of breath  Follow up with your medical doctor to establish long term blood pressure treatment goals.

## 2018-05-09 NOTE — DISCHARGE NOTE ADULT - CARE PROVIDERS DIRECT ADDRESSES
[email or web address]

## 2018-05-09 NOTE — DISCHARGE NOTE ADULT - PLAN OF CARE
No further episodes Weigh yourself daily.  If you gain 3lbs in 3 days, or 5lbs in a week call your Health Care Provider.  Do not eat or drink foods containing more than 2000mg of salt (sodium) in your diet every day.  Call your Health Care Provider if you have any swelling or increased swelling in your feet, ankles, and/or stomach.  Take all of your medication as directed.  If you become dizzy call your Health Care Provider. Atrial fibrillation is the most common heart rhythm problem.  The condition puts you at risk for has stroke and heart attack  It helps if you control your blood pressure, not drink more than 1-2 alcohol drinks per day, cut down on caffeine, getting treatment for over active thyroid gland, and get regular exercise  Call your doctor if you feel your heart racing or beating unusually, chest tightness or pain, lightheaded, faint, shortness of breath especially with exercise  It is important to take your heart medication as prescribed  No blood thinner due to blood in urine and new bladder mass Scheduled on Wed 5/16/18 for Transurethral resection of bladder mass - Call and confirm date with Urologist -Dr. Ibarra Take medications for your blood pressure as recommended.  Eat a heart healthy diet that is low in saturated fats and salt, and includes whole grains, fruits, vegetables and lean protein   Exercise regularly (consult with your physician or cardiologist first); maintain a heart healthy weight.   Seek medical help for dizziness, Lightheadedness, Blurry vision, Headache, Chest pain, Shortness of breath  Follow up with your medical doctor to establish long term blood pressure treatment goals.

## 2018-05-09 NOTE — PROGRESS NOTE ADULT - SUBJECTIVE AND OBJECTIVE BOX
less  sob  REVIEW OF SYSTEMS:  CONSTITUTIONAL: No weakness,  no   fevers      RESPIRATORY:  No    shortness of breath  , no  wheezing  CARDIOVASCULAR: No chest pain,   no  palpitations, no  cough  GASTROINTESTINAL: No abdominal  pain, no  vomiting, no  diarrhea  NEUROLOGICAL: No  focal  weakness    MEDICATIONS  (STANDING):  amiodarone    Tablet 200 milliGRAM(s) Oral daily  aspirin enteric coated 81 milliGRAM(s) Oral daily  furosemide   Injectable 20 milliGRAM(s) IV Push two times a day  levETIRAcetam 500 milliGRAM(s) Oral two times a day  metoprolol succinate  milliGRAM(s) Oral daily  simvastatin 20 milliGRAM(s) Oral at bedtime  tamsulosin 0.4 milliGRAM(s) Oral at bedtime    MEDICATIONS  (PRN):      Vital Signs Last 24 Hrs  T(C): 36.4 (09 May 2018 04:19), Max: 36.6 (08 May 2018 11:32)  T(F): 97.5 (09 May 2018 04:19), Max: 97.9 (08 May 2018 11:32)  HR: 97 (09 May 2018 04:19) (80 - 97)  BP: 151/93 (09 May 2018 04:19) (130/81 - 151/93)  BP(mean): --  RR: 18 (09 May 2018 04:19) (18 - 18)  SpO2: 96% (09 May 2018 04:19) (96% - 97%)  CAPILLARY BLOOD GLUCOSE        I&O's Summary    08 May 2018 07:01  -  09 May 2018 07:00  --------------------------------------------------------  IN: 1520 mL / OUT: 1450 mL / NET: 70 mL    09 May 2018 07:01  -  09 May 2018 08:03  --------------------------------------------------------  IN: 0 mL / OUT: 650 mL / NET: -650 mL        PHYSICAL EXAM:  HEAD:  Atraumatic, Normocephalic  NECK: Supple, No   JVD  CHEST/LUNG:   no     rales,     no,    rhonchi  HEART: Regular rate and rhythm;         murmur  ABDOMEN: Soft, Nontender, ;   EXTREMITIES:        edema  NEUROLOGY:  alert    LABS:                        14.1   4.97  )-----------( 220      ( 09 May 2018 07:44 )             42.2     05-09    143  |  100  |  18  ----------------------------<  83  3.2<L>   |  28  |  1.07    Ca    9.3      09 May 2018 06:30    TPro  7.9  /  Alb  4.1  /  TBili  0.5  /  DBili  x   /  AST  36  /  ALT  16  /  AlkPhos  62  05-07    PT/INR - ( 07 May 2018 17:49 )   PT: 13.0 sec;   INR: 1.20 ratio         PTT - ( 07 May 2018 17:49 )  PTT:30.1 sec  CARDIAC MARKERS ( 08 May 2018 12:11 )  x     / <0.01 ng/mL / 70 U/L / x     / 2.2 ng/mL  CARDIAC MARKERS ( 08 May 2018 01:50 )  x     / <0.01 ng/mL / x     / x     / x      CARDIAC MARKERS ( 07 May 2018 17:49 )  x     / <0.01 ng/mL / 115 U/L / x     / 3.7 ng/mL            05-07 @ 17:49  11.1  24      Thyroid Stimulating Hormone, Serum: 4.14 uIU/mL (04-20 @ 08:14)          Consultant(s) Notes Reviewed:      Care Discussed with Consultants/Other Providers:

## 2018-05-09 NOTE — PROGRESS NOTE ADULT - SUBJECTIVE AND OBJECTIVE BOX
- Patient seen and examined.  - In summary, patient is a 89 year old man who presented with chf (07 May 2018 19:39)  - Today, patient is without complaints.         *****MEDICATIONS:    MEDICATIONS  (STANDING):  amiodarone    Tablet 200 milliGRAM(s) Oral daily  aspirin enteric coated 81 milliGRAM(s) Oral daily  furosemide   Injectable 20 milliGRAM(s) IV Push two times a day  levETIRAcetam 500 milliGRAM(s) Oral two times a day  metoprolol succinate  milliGRAM(s) Oral daily  simvastatin 20 milliGRAM(s) Oral at bedtime  tamsulosin 0.4 milliGRAM(s) Oral at bedtime    MEDICATIONS  (PRN):             ***** REVIEW OF SYSTEM:  GEN: no fever, no chills, no pain  RESP: no SOB, no cough, no sputum  CVS: no chest pain, no palpitations, no edema  GI: no abdominal pain, no nausea, no vomiting, no constipation, no diarrhea  : no dysuria, no frequency  NEURO: no headache, no dizziness  PSYCH: no depression, not anxious  Derm : no itching, no rash         ***** VITAL SIGNS:    T(F): 97.5 (18 @ 04:19), Max: 97.9 (18 @ 11:32)  HR: 97 (18 @ 04:19) (80 - 97)  BP: 151/93 (18 @ 04:19) (130/81 - 151/93)  RR: 18 (18 @ 04:19) (18 - 18)  SpO2: 96% (18 @ 04:19) (96% - 97%)  Wt(kg): --  ,   I&O's Summary    08 May 2018 07:  -  09 May 2018 07:00  --------------------------------------------------------  IN: 1520 mL / OUT: 1450 mL / NET: 70 mL    09 May 2018 07:  -  09 May 2018 08:58  --------------------------------------------------------  IN: 0 mL / OUT: 650 mL / NET: -650 mL                   *****PHYSICAL EXAM:  GEN: A&O X 3 , NAD , comfortable  HEENT: NCAT, EOMI, MMM, no icterus  NECK: Supple, No JVD  CVS: S1S2 , regular , No M/R/G appreciated  PULM: CTA B/L,  no W/R/R appreciated  ABD.: soft. non tender, non distended,  bowel sounds present  Extrem: intact pulses , no edema noted  Derm: No rash or ecchymosis noted  PSYCH: normal mood, no depression, not anxious         *****LAB AND IMAGIN.1   4.97  )-----------( 220      ( 09 May 2018 07:44 )             42.2                   143  |  100  |  18  ----------------------------<  83  3.2<L>   |  28  |  1.07    Ca    9.3      09 May 2018 06:30    TPro  7.9  /  Alb  4.1  /  TBili  0.5  /  DBili  x   /  AST  36  /  ALT  16  /  AlkPhos  62  05-07    PT/INR - ( 07 May 2018 17:49 )   PT: 13.0 sec;   INR: 1.20 ratio         PTT - ( 07 May 2018 17:49 )  PTT:30.1 sec       CARDIAC MARKERS ( 08 May 2018 12:11 )  x     / <0.01 ng/mL / 70 U/L / x     / 2.2 ng/mL  CARDIAC MARKERS ( 08 May 2018 01:50 )  x     / <0.01 ng/mL / x     / x     / x      CARDIAC MARKERS ( 07 May 2018 17:49 )  x     / <0.01 ng/mL / 115 U/L / x     / 3.7 ng/mL             [All pertinent recent Imaging/Reports reviewed]         *****A S S E S S M E N T   A N D   P L A N :  89M with chf, AF  chronic a.fib  on amio  can not be ac with hematuria and bladder tumor   input noted  cont tele  beta blocker per hr/bp  transfuse as need   iv lasix for O=I  needs to ambulate  DCP    __________________________  A. DANI Robertson

## 2018-05-09 NOTE — DISCHARGE NOTE ADULT - HOSPITAL COURSE
89 yr old male with PMH of TIA, seizure disorder, former smoker,  A Fib on eliquis recently Dced for significant hematuria, HTN, HLD, Systolic HF with EF 30-35% in 2016,  prostate cancer- s/p seed implant 25 yrs ago and diagnosed last week with bladder CA scheduled for surgery. Sent in by Dr Trejo for progressive ADAMS and chest pressure over the last few days.  Chest Xray - New bilateral pleural effusions, left greater than right. Cardiomegaly.  BNP - 5623  Admitted with acute on chronic systolic HF - treated with IV Lasix   Patient with Chronic Afib - No AC sec to Hematuria and bladder Ca - on Amiodarone  Patient to follow up with Urologist for work up on bladder tumor 89 yr old male with PMH of TIA, seizure disorder, former smoker,  A Fib on eliquis recently Dced for significant hematuria, HTN, HLD, Systolic HF with EF 30-35% in 2016,  prostate cancer- s/p seed implant 25 yrs ago and diagnosed last week with bladder CA scheduled for surgery. Sent in by Dr Trejo for progressive ADAMS and chest pressure over the last few days.  Chest Xray - New bilateral pleural effusions, left greater than right. Cardiomegaly.  BNP - 5623  Admitted with acute on chronic systolic HF - treated with IV Lasix   Patient with Chronic Afib - No AC sec to Hematuria and bladder Ca - on Amiodarone  Patient to follow up with Urologist for work up on bladder tumor, next week

## 2018-05-09 NOTE — DISCHARGE NOTE ADULT - ADDITIONAL INSTRUCTIONS
Scheduled on Wed 5/16/18 for Transurethral resection of bladder mass - Call and confirm date with Urologist -Dr. Ibarra  Follow up with Cardiologist - Dr. Trejo in 1 week - call for appointment Scheduled on Wed 5/16/18 for Transurethral resection of bladder mass - Call and confirm date with Urologist -Dr. Ibarra  Follow up with Cardiologist - Dr. Trejo on Monday 5/14/18 at 2pm

## 2018-05-09 NOTE — DISCHARGE NOTE ADULT - MEDICATION SUMMARY - MEDICATIONS TO TAKE
I will START or STAY ON the medications listed below when I get home from the hospital:    aspirin 81 mg oral delayed release tablet  -- 1 tab(s) by mouth once a day  -- Indication: For CAD    tamsulosin 0.4 mg oral capsule  -- 1 cap(s) by mouth once a day (at bedtime)  -- Indication: For BPH    amiodarone 200 mg oral tablet  -- 1 tab(s) by mouth once a day in the AM  -- Indication: For AFIB    levETIRAcetam 500 mg oral tablet  -- 1 tab(s) by mouth 2 times a day  -- Indication: For Seizure    simvastatin 20 mg oral tablet  -- 1 tab(s) by mouth once a day (at bedtime)  -- Indication: For cholesterol    Metoprolol Succinate  mg oral tablet, extended release  -- 1 tab(s) by mouth 2 times a day  -- It is very important that you take or use this exactly as directed.  Do not skip doses or discontinue unless directed by your doctor.  May cause drowsiness.  Alcohol may intensify this effect.  Use care when operating dangerous machinery.  Some non-prescription drugs may aggravate your condition.  Read all labels carefully.  If a warning appears, check with your doctor before taking.  Swallow whole.  Do not crush.  Take with food or milk.  This drug may impair the ability to drive or operate machinery.  Use care until you become familiar with its effects.    -- Indication: For Htn    furosemide 40 mg oral tablet  -- 1 tab(s) by mouth once a day  -- Indication: For chf I will START or STAY ON the medications listed below when I get home from the hospital:    aspirin 81 mg oral delayed release tablet  -- 1 tab(s) by mouth once a day  -- Indication: For CAD    tamsulosin 0.4 mg oral capsule  -- 1 cap(s) by mouth once a day (at bedtime)  -- Indication: For BPH    amiodarone 200 mg oral tablet  -- 1 tab(s) by mouth once a day in the AM  -- Indication: For AFIB    levETIRAcetam 500 mg oral tablet  -- 1 tab(s) by mouth 2 times a day  -- Indication: For Seizure    simvastatin 20 mg oral tablet  -- 1 tab(s) by mouth once a day (at bedtime)  -- Indication: For cholesterol    Metoprolol Succinate  mg oral tablet, extended release  -- 1 tab(s) by mouth 2 times a day  -- It is very important that you take or use this exactly as directed.  Do not skip doses or discontinue unless directed by your doctor.  May cause drowsiness.  Alcohol may intensify this effect.  Use care when operating dangerous machinery.  Some non-prescription drugs may aggravate your condition.  Read all labels carefully.  If a warning appears, check with your doctor before taking.  Swallow whole.  Do not crush.  Take with food or milk.  This drug may impair the ability to drive or operate machinery.  Use care until you become familiar with its effects.    -- Indication: For Htn    furosemide 20 mg oral tablet  -- 1 tab(s) by mouth once a day  -- Indication: For chf

## 2018-05-09 NOTE — PROGRESS NOTE ADULT - ASSESSMENT
pt with hx of paf ,  cardiomyopathy, ca bladder  admitted  with  sob,   from acute systolic  chf  continue Metoprolol 100 mg bid, iv lasix  hold ac ,  given h/o hematuria/ ca bladder  urology  to  f/p, needs surg, eventually   tele,

## 2018-05-09 NOTE — DISCHARGE NOTE ADULT - PATIENT PORTAL LINK FT
You can access the [facility] Patient Portal, offered by [facility], by registering with the following website: [email or web address]

## 2018-05-09 NOTE — PHYSICAL THERAPY INITIAL EVALUATION ADULT - ADDITIONAL COMMENTS
lives w/ lives w/wife in apt/ elevator, does not use assistive device, glasses for reading, hearing good, R handed

## 2018-05-10 VITALS
RESPIRATION RATE: 18 BRPM | TEMPERATURE: 98 F | OXYGEN SATURATION: 99 % | HEART RATE: 84 BPM | SYSTOLIC BLOOD PRESSURE: 111 MMHG | DIASTOLIC BLOOD PRESSURE: 69 MMHG

## 2018-05-10 LAB
ANION GAP SERPL CALC-SCNC: 14 MMOL/L — SIGNIFICANT CHANGE UP (ref 5–17)
BUN SERPL-MCNC: 21 MG/DL — SIGNIFICANT CHANGE UP (ref 7–23)
CALCIUM SERPL-MCNC: 9 MG/DL — SIGNIFICANT CHANGE UP (ref 8.4–10.5)
CHLORIDE SERPL-SCNC: 101 MMOL/L — SIGNIFICANT CHANGE UP (ref 96–108)
CO2 SERPL-SCNC: 27 MMOL/L — SIGNIFICANT CHANGE UP (ref 22–31)
CREAT SERPL-MCNC: 1.11 MG/DL — SIGNIFICANT CHANGE UP (ref 0.5–1.3)
GLUCOSE SERPL-MCNC: 85 MG/DL — SIGNIFICANT CHANGE UP (ref 70–99)
MAGNESIUM SERPL-MCNC: 1.8 MG/DL — SIGNIFICANT CHANGE UP (ref 1.6–2.6)
POTASSIUM SERPL-MCNC: 3.2 MMOL/L — LOW (ref 3.5–5.3)
POTASSIUM SERPL-SCNC: 3.2 MMOL/L — LOW (ref 3.5–5.3)
SODIUM SERPL-SCNC: 142 MMOL/L — SIGNIFICANT CHANGE UP (ref 135–145)

## 2018-05-10 PROCEDURE — 97161 PT EVAL LOW COMPLEX 20 MIN: CPT

## 2018-05-10 PROCEDURE — 83735 ASSAY OF MAGNESIUM: CPT

## 2018-05-10 PROCEDURE — 82435 ASSAY OF BLOOD CHLORIDE: CPT

## 2018-05-10 PROCEDURE — 99285 EMERGENCY DEPT VISIT HI MDM: CPT

## 2018-05-10 PROCEDURE — 71045 X-RAY EXAM CHEST 1 VIEW: CPT

## 2018-05-10 PROCEDURE — 83605 ASSAY OF LACTIC ACID: CPT

## 2018-05-10 PROCEDURE — 84484 ASSAY OF TROPONIN QUANT: CPT

## 2018-05-10 PROCEDURE — 84132 ASSAY OF SERUM POTASSIUM: CPT

## 2018-05-10 PROCEDURE — 85027 COMPLETE CBC AUTOMATED: CPT

## 2018-05-10 PROCEDURE — 86900 BLOOD TYPING SEROLOGIC ABO: CPT

## 2018-05-10 PROCEDURE — 82947 ASSAY GLUCOSE BLOOD QUANT: CPT

## 2018-05-10 PROCEDURE — 93308 TTE F-UP OR LMTD: CPT

## 2018-05-10 PROCEDURE — 80048 BASIC METABOLIC PNL TOTAL CA: CPT

## 2018-05-10 PROCEDURE — 84295 ASSAY OF SERUM SODIUM: CPT

## 2018-05-10 PROCEDURE — 85730 THROMBOPLASTIN TIME PARTIAL: CPT

## 2018-05-10 PROCEDURE — 82550 ASSAY OF CK (CPK): CPT

## 2018-05-10 PROCEDURE — 83880 ASSAY OF NATRIURETIC PEPTIDE: CPT

## 2018-05-10 PROCEDURE — 82330 ASSAY OF CALCIUM: CPT

## 2018-05-10 PROCEDURE — 93005 ELECTROCARDIOGRAM TRACING: CPT

## 2018-05-10 PROCEDURE — 82803 BLOOD GASES ANY COMBINATION: CPT

## 2018-05-10 PROCEDURE — 82553 CREATINE MB FRACTION: CPT

## 2018-05-10 PROCEDURE — 85014 HEMATOCRIT: CPT

## 2018-05-10 PROCEDURE — 80053 COMPREHEN METABOLIC PANEL: CPT

## 2018-05-10 PROCEDURE — 86901 BLOOD TYPING SEROLOGIC RH(D): CPT

## 2018-05-10 PROCEDURE — 86850 RBC ANTIBODY SCREEN: CPT

## 2018-05-10 PROCEDURE — 85610 PROTHROMBIN TIME: CPT

## 2018-05-10 RX ORDER — POTASSIUM CHLORIDE 20 MEQ
40 PACKET (EA) ORAL ONCE
Qty: 0 | Refills: 0 | Status: COMPLETED | OUTPATIENT
Start: 2018-05-10 | End: 2018-05-10

## 2018-05-10 RX ORDER — FUROSEMIDE 40 MG
20 TABLET ORAL ONCE
Qty: 0 | Refills: 0 | Status: DISCONTINUED | OUTPATIENT
Start: 2018-05-10 | End: 2018-05-10

## 2018-05-10 RX ORDER — FUROSEMIDE 40 MG
20 TABLET ORAL DAILY
Qty: 0 | Refills: 0 | Status: DISCONTINUED | OUTPATIENT
Start: 2018-05-10 | End: 2018-05-10

## 2018-05-10 RX ADMIN — AMIODARONE HYDROCHLORIDE 200 MILLIGRAM(S): 400 TABLET ORAL at 05:28

## 2018-05-10 RX ADMIN — Medication 20 MILLIGRAM(S): at 05:28

## 2018-05-10 RX ADMIN — Medication 20 MILLIGRAM(S): at 12:59

## 2018-05-10 RX ADMIN — Medication 100 MILLIGRAM(S): at 05:28

## 2018-05-10 RX ADMIN — Medication 81 MILLIGRAM(S): at 12:59

## 2018-05-10 RX ADMIN — LEVETIRACETAM 500 MILLIGRAM(S): 250 TABLET, FILM COATED ORAL at 05:28

## 2018-05-10 RX ADMIN — Medication 40 MILLIEQUIVALENT(S): at 00:00

## 2018-05-10 RX ADMIN — Medication 40 MILLIEQUIVALENT(S): at 12:59

## 2018-05-10 RX ADMIN — Medication 40 MILLIEQUIVALENT(S): at 09:54

## 2018-05-10 NOTE — PROGRESS NOTE ADULT - ASSESSMENT
pt with hx of paf ,  cardiomyopathy, ca bladder  admitted  with  sob,   from acute systolic  chf  continue Metoprolol 100 mg bid, iv lasix  hold ac ,  given h/o hematuria/ ca bladder  urology   saw  pt   doing better

## 2018-05-10 NOTE — CHART NOTE - NSCHARTNOTEFT_GEN_A_CORE
Discharge Note:    Requested by Dr. Robertson to facilitate discharge home.  V/s, labs, diagnostics reviewed, pt stable to d/c now.  Medication reconciliation reviewed, revised, and resolved with Attending, Dr. Pearce who medically cleared w/ f/u as directed. Please refer to d/c note for hospital details.    Asher Morris  Spectra-link [id number]

## 2018-05-10 NOTE — PROGRESS NOTE ADULT - ASSESSMENT
Impression: Bladder tumor  For OR next week with Dr Ibarra  As per patient, he is being discharged later today and will follow-up for procedure next week, as scheduled.    Please call with any questions/concerns.    Geo Lawler MD  [phone] Impression: Bladder tumor  For OR next week with Dr Ibarra  As per patient/chart review, he is being discharged later today and will follow-up for procedure next week, as scheduled.    Please call with any questions/concerns.    Geo Lawler MD  [phone]

## 2018-05-10 NOTE — PROGRESS NOTE ADULT - SUBJECTIVE AND OBJECTIVE BOX
Pt feeling well, voiding without difficulty, no pain or dysuria  Afebrile  VSS  Abd soft, NT/ND    Impression: Bladder tumor  For OR next week with Dr Ibarra  As per patient, he is being discharged later today and will follow-up for procedure next week, as scheduled.    Please call with any questions/concerns.    Geo Lawler MD  [phone] Pt feeling well, voiding without difficulty, no pain or dysuria  Afebrile  VSS  Abd soft, NT/ND

## 2018-05-10 NOTE — PROGRESS NOTE ADULT - SUBJECTIVE AND OBJECTIVE BOX
no complaints  REVIEW OF SYSTEMS:  CONSTITUTIONAL: No weakness,  no   fevers      RESPIRATORY:  No    shortness of breath  , no  wheezing  CARDIOVASCULAR: No chest pain,   no  palpitations, no  cough  GASTROINTESTINAL: No abdominal  pain, no  vomiting, no  diarrhea  NEUROLOGICAL: No  focal  weakness    MEDICATIONS  (STANDING):  amiodarone    Tablet 200 milliGRAM(s) Oral daily  aspirin enteric coated 81 milliGRAM(s) Oral daily  furosemide   Injectable 20 milliGRAM(s) IV Push two times a day  levETIRAcetam 500 milliGRAM(s) Oral two times a day  metoprolol succinate  milliGRAM(s) Oral daily  simvastatin 20 milliGRAM(s) Oral at bedtime  tamsulosin 0.4 milliGRAM(s) Oral at bedtime    MEDICATIONS  (PRN):      Vital Signs Last 24 Hrs  T(C): 36.5 (10 May 2018 04:00), Max: 36.7 (09 May 2018 09:59)  T(F): 97.7 (10 May 2018 04:00), Max: 98 (09 May 2018 09:59)  HR: 99 (10 May 2018 04:00) (78 - 99)  BP: 119/76 (10 May 2018 04:00) (105/72 - 126/88)  BP(mean): --  RR: 18 (10 May 2018 04:00) (16 - 18)  SpO2: 97% (10 May 2018 04:00) (96% - 98%)  CAPILLARY BLOOD GLUCOSE        I&O's Summary    09 May 2018 07:01  -  10 May 2018 07:00  --------------------------------------------------------  IN: 1080 mL / OUT: 1100 mL / NET: -20 mL    10 May 2018 07:01  -  10 May 2018 08:02  --------------------------------------------------------  IN: 0 mL / OUT: 400 mL / NET: -400 mL        PHYSICAL EXAM:  HEAD:  Atraumatic, Normocephalic  NECK: Supple, No   JVD  CHEST/LUNG:   no     rales,     no,    rhonchi  HEART: Regular rate and rhythm;         murmur  ABDOMEN: Soft, Nontender, ;   EXTREMITIES:        edema  NEUROLOGY:  alert    LABS:                        14.1   4.97  )-----------( 220      ( 09 May 2018 07:44 )             42.2     05-10    142  |  101  |  21  ----------------------------<  85  3.2<L>   |  27  |  1.11    Ca    9.0      10 May 2018 05:55  Mg     1.8     05-10        CARDIAC MARKERS ( 08 May 2018 12:11 )  x     / <0.01 ng/mL / 70 U/L / x     / 2.2 ng/mL                Thyroid Stimulating Hormone, Serum: 4.14 uIU/mL (04-20 @ 08:14)          Consultant(s) Notes Reviewed:      Care Discussed with Consultants/Other Providers:

## 2018-05-10 NOTE — PROGRESS NOTE ADULT - SUBJECTIVE AND OBJECTIVE BOX
- Patient seen and examined.  - In summary, patient is a 89 year old man who presented with chf (07 May 2018 19:39)  - Today, patient is without complaints.         *****MEDICATIONS:    MEDICATIONS  (STANDING):  amiodarone    Tablet 200 milliGRAM(s) Oral daily  aspirin enteric coated 81 milliGRAM(s) Oral daily  furosemide    Tablet 20 milliGRAM(s) Oral once  levETIRAcetam 500 milliGRAM(s) Oral two times a day  metoprolol succinate  milliGRAM(s) Oral daily  simvastatin 20 milliGRAM(s) Oral at bedtime  tamsulosin 0.4 milliGRAM(s) Oral at bedtime    MEDICATIONS  (PRN):             ***** REVIEW OF SYSTEM:  GEN: no fever, no chills, no pain  RESP: no SOB, no cough, no sputum  CVS: no chest pain, no palpitations, no edema  GI: no abdominal pain, no nausea, no vomiting, no constipation, no diarrhea  : no dysuria, no frequency  NEURO: no headache, no dizziness  PSYCH: no depression, not anxious  Derm : no itching, no rash         ***** VITAL SIGNS:    T(F): 97.7 (05-10-18 @ 04:00), Max: 97.7 (05-10-18 @ 04:00)  HR: 99 (05-10-18 @ 04:00) (78 - 99)  BP: 119/76 (05-10-18 @ 04:00) (110/72 - 126/88)  RR: 18 (05-10-18 @ 04:00) (16 - 18)  SpO2: 97% (05-10-18 @ 04:00) (96% - 98%)  Wt(kg): --  ,   I&O's Summary    09 May 2018 07:  -  10 May 2018 07:00  --------------------------------------------------------  IN: 1080 mL / OUT: 1100 mL / NET: -20 mL    10 May 2018 07:  -  10 May 2018 09:59  --------------------------------------------------------  IN: 0 mL / OUT: 600 mL / NET: -600 mL                   *****PHYSICAL EXAM:  GEN: A&O X 3 , NAD , comfortable  HEENT: NCAT, EOMI, MMM, no icterus  NECK: Supple, No JVD  CVS: S1S2 , regular , No M/R/G appreciated  PULM: CTA B/L,  no W/R/R appreciated  ABD.: soft. non tender, non distended,  bowel sounds present  Extrem: intact pulses , no edema noted  Derm: No rash or ecchymosis noted  PSYCH: normal mood, no depression, not anxious         *****LAB AND IMAGIN.1   4.97  )-----------( 220      ( 09 May 2018 07:44 )             42.2               05-10    142  |  101  |  21  ----------------------------<  85  3.2<L>   |  27  |  1.11    Ca    9.0      10 May 2018 05:55  Mg     1.8     05-10             CARDIAC MARKERS ( 08 May 2018 12:11 )  x     / <0.01 ng/mL / 70 U/L / x     / 2.2 ng/mL             [All pertinent recent Imaging/Reports reviewed]         *****A S S E S S M E N T   A N D   P L A N :  89M with chf, AF  chronic a.fib  on amio  can not be ac with hematuria and bladder tumor   input noted  cont tele  beta blocker per hr/bp  lasix for O=I  ambulated without issue  DCP home today  f/u  next week as previously planned    __________________________  DALILA Robertson D.O.

## 2018-05-11 RX ORDER — FUROSEMIDE 40 MG
1 TABLET ORAL
Qty: 30 | Refills: 0 | OUTPATIENT
Start: 2018-05-11

## 2018-05-11 RX ORDER — FUROSEMIDE 40 MG
1 TABLET ORAL
Qty: 30 | Refills: 0 | OUTPATIENT
Start: 2018-05-11 | End: 2018-06-09

## 2018-05-15 ENCOUNTER — TRANSCRIPTION ENCOUNTER (OUTPATIENT)
Age: 83
End: 2018-05-15

## 2018-05-16 ENCOUNTER — OUTPATIENT (OUTPATIENT)
Dept: OUTPATIENT SERVICES | Facility: HOSPITAL | Age: 83
LOS: 1 days | End: 2018-05-16
Payer: MEDICARE

## 2018-05-16 ENCOUNTER — RESULT REVIEW (OUTPATIENT)
Age: 83
End: 2018-05-16

## 2018-05-16 VITALS
RESPIRATION RATE: 16 BRPM | HEART RATE: 102 BPM | DIASTOLIC BLOOD PRESSURE: 104 MMHG | OXYGEN SATURATION: 98 % | SYSTOLIC BLOOD PRESSURE: 160 MMHG | TEMPERATURE: 97 F

## 2018-05-16 DIAGNOSIS — Z85.46 PERSONAL HISTORY OF MALIGNANT NEOPLASM OF PROSTATE: Chronic | ICD-10-CM

## 2018-05-16 DIAGNOSIS — C67.9 MALIGNANT NEOPLASM OF BLADDER, UNSPECIFIED: ICD-10-CM

## 2018-05-16 PROCEDURE — 88304 TISSUE EXAM BY PATHOLOGIST: CPT | Mod: 26

## 2018-05-16 PROCEDURE — 88342 IMHCHEM/IMCYTCHM 1ST ANTB: CPT | Mod: 26

## 2018-05-16 PROCEDURE — 88341 IMHCHEM/IMCYTCHM EA ADD ANTB: CPT | Mod: 26

## 2018-05-16 RX ORDER — SIMVASTATIN 20 MG/1
20 TABLET, FILM COATED ORAL AT BEDTIME
Qty: 0 | Refills: 0 | Status: DISCONTINUED | OUTPATIENT
Start: 2018-05-16 | End: 2018-05-31

## 2018-05-16 RX ORDER — CEPHALEXIN 500 MG
500 CAPSULE ORAL THREE TIMES A DAY
Qty: 0 | Refills: 0 | Status: DISCONTINUED | OUTPATIENT
Start: 2018-05-16 | End: 2018-05-31

## 2018-05-16 RX ORDER — FAMOTIDINE 10 MG/ML
20 INJECTION INTRAVENOUS ONCE
Qty: 0 | Refills: 0 | Status: COMPLETED | OUTPATIENT
Start: 2018-05-16 | End: 2018-05-16

## 2018-05-16 RX ORDER — TAMSULOSIN HYDROCHLORIDE 0.4 MG/1
0.4 CAPSULE ORAL AT BEDTIME
Qty: 0 | Refills: 0 | Status: DISCONTINUED | OUTPATIENT
Start: 2018-05-16 | End: 2018-05-31

## 2018-05-16 RX ORDER — ASPIRIN/CALCIUM CARB/MAGNESIUM 324 MG
81 TABLET ORAL DAILY
Qty: 0 | Refills: 0 | Status: DISCONTINUED | OUTPATIENT
Start: 2018-05-16 | End: 2018-05-16

## 2018-05-16 RX ORDER — ASPIRIN/CALCIUM CARB/MAGNESIUM 324 MG
81 TABLET ORAL DAILY
Qty: 0 | Refills: 0 | Status: DISCONTINUED | OUTPATIENT
Start: 2018-05-17 | End: 2018-05-31

## 2018-05-16 RX ORDER — ONDANSETRON 8 MG/1
4 TABLET, FILM COATED ORAL ONCE
Qty: 0 | Refills: 0 | Status: DISCONTINUED | OUTPATIENT
Start: 2018-05-16 | End: 2018-05-16

## 2018-05-16 RX ORDER — AMIODARONE HYDROCHLORIDE 400 MG/1
200 TABLET ORAL DAILY
Qty: 0 | Refills: 0 | Status: DISCONTINUED | OUTPATIENT
Start: 2018-05-16 | End: 2018-05-31

## 2018-05-16 RX ORDER — LEVETIRACETAM 250 MG/1
500 TABLET, FILM COATED ORAL
Qty: 0 | Refills: 0 | Status: DISCONTINUED | OUTPATIENT
Start: 2018-05-16 | End: 2018-05-31

## 2018-05-16 RX ORDER — ACETAMINOPHEN 500 MG
650 TABLET ORAL EVERY 6 HOURS
Qty: 0 | Refills: 0 | Status: DISCONTINUED | OUTPATIENT
Start: 2018-05-16 | End: 2018-05-31

## 2018-05-16 RX ORDER — SODIUM CHLORIDE 9 MG/ML
1000 INJECTION, SOLUTION INTRAVENOUS
Qty: 0 | Refills: 0 | Status: DISCONTINUED | OUTPATIENT
Start: 2018-05-16 | End: 2018-05-31

## 2018-05-16 RX ORDER — CEPHALEXIN 500 MG
1 CAPSULE ORAL
Qty: 9 | Refills: 0 | OUTPATIENT
Start: 2018-05-16 | End: 2018-05-18

## 2018-05-16 RX ORDER — BENZOCAINE AND MENTHOL 5; 1 G/100ML; G/100ML
1 LIQUID ORAL EVERY 4 HOURS
Qty: 0 | Refills: 0 | Status: DISCONTINUED | OUTPATIENT
Start: 2018-05-16 | End: 2018-05-31

## 2018-05-16 RX ORDER — HYDROMORPHONE HYDROCHLORIDE 2 MG/ML
0.25 INJECTION INTRAMUSCULAR; INTRAVENOUS; SUBCUTANEOUS
Qty: 0 | Refills: 0 | Status: DISCONTINUED | OUTPATIENT
Start: 2018-05-16 | End: 2018-05-17

## 2018-05-16 RX ORDER — LIDOCAINE 4 G/100G
1 CREAM TOPICAL
Qty: 0 | Refills: 0 | Status: DISCONTINUED | OUTPATIENT
Start: 2018-05-16 | End: 2018-05-31

## 2018-05-16 RX ORDER — ONDANSETRON 8 MG/1
4 TABLET, FILM COATED ORAL EVERY 8 HOURS
Qty: 0 | Refills: 0 | Status: DISCONTINUED | OUTPATIENT
Start: 2018-05-16 | End: 2018-05-31

## 2018-05-16 RX ORDER — SODIUM CHLORIDE 9 MG/ML
3 INJECTION INTRAMUSCULAR; INTRAVENOUS; SUBCUTANEOUS EVERY 8 HOURS
Qty: 0 | Refills: 0 | Status: DISCONTINUED | OUTPATIENT
Start: 2018-05-16 | End: 2018-05-16

## 2018-05-16 RX ORDER — LABETALOL HCL 100 MG
10 TABLET ORAL ONCE
Qty: 0 | Refills: 0 | Status: COMPLETED | OUTPATIENT
Start: 2018-05-16 | End: 2018-05-16

## 2018-05-16 RX ORDER — CEFAZOLIN SODIUM 1 G
2000 VIAL (EA) INJECTION ONCE
Qty: 0 | Refills: 0 | Status: COMPLETED | OUTPATIENT
Start: 2018-05-16 | End: 2018-05-16

## 2018-05-16 RX ORDER — ACETAMINOPHEN 500 MG
1000 TABLET ORAL ONCE
Qty: 0 | Refills: 0 | Status: COMPLETED | OUTPATIENT
Start: 2018-05-16 | End: 2018-05-16

## 2018-05-16 RX ADMIN — SIMVASTATIN 20 MILLIGRAM(S): 20 TABLET, FILM COATED ORAL at 21:50

## 2018-05-16 RX ADMIN — SODIUM CHLORIDE 75 MILLILITER(S): 9 INJECTION, SOLUTION INTRAVENOUS at 15:00

## 2018-05-16 RX ADMIN — Medication 1000 MILLIGRAM(S): at 15:00

## 2018-05-16 RX ADMIN — TAMSULOSIN HYDROCHLORIDE 0.4 MILLIGRAM(S): 0.4 CAPSULE ORAL at 21:41

## 2018-05-16 RX ADMIN — Medication 10 MILLIGRAM(S): at 16:45

## 2018-05-16 RX ADMIN — FAMOTIDINE 20 MILLIGRAM(S): 10 INJECTION INTRAVENOUS at 17:26

## 2018-05-16 RX ADMIN — Medication 400 MILLIGRAM(S): at 14:00

## 2018-05-16 RX ADMIN — LEVETIRACETAM 500 MILLIGRAM(S): 250 TABLET, FILM COATED ORAL at 21:50

## 2018-05-16 RX ADMIN — Medication 500 MILLIGRAM(S): at 21:41

## 2018-05-16 NOTE — H&P PST ADULT - ASSESSMENT
89 year old male presents for elective TURBT, insertion of right ureteral stent, possible left ureteral stent placement    - NPO  - Cardiac clearance paperwork to be faxed by cardiologist Dr. Trejo  - consent  - Proceed with surgery

## 2018-05-16 NOTE — H&P PST ADULT - HISTORY OF PRESENT ILLNESS
89 year old male with PMH of TIA, seizure disorder, A.Fib (no on AC), HTN, HLD, systolic HF presents for scheduled TURBT. Patient recently admitted 5/7-5/10 with CHF for cardiac evaluation. Patient known to Dr. Sims, CT and cysto c/w bladder tumor of right wall. Denies fevers, chills, nausea, vomiting, SOB, chest pain, hematuria or dysuria. States he can walk two blocks without SOB. Patient cardiac cleared by cardiology Dr. Pearce. Recent stress test done and PPM checked two weeks ago. Patient previously taking Eliquis for A.Fib although d/c'ed 2/2 hematuria. Currently taking only ASA, last dose yesterday

## 2018-05-16 NOTE — H&P PST ADULT - ATTENDING COMMENTS
as above-forTURBT-possible ureteral stent pending intraop findings. d/w pt and family-rationale risk alternatives reviewed-all questions answered

## 2018-05-16 NOTE — ASU DISCHARGE PLAN (ADULT/PEDIATRIC). - MEDICATION SUMMARY - MEDICATIONS TO TAKE
I will START or STAY ON the medications listed below when I get home from the hospital:    aspirin 81 mg oral delayed release tablet  -- 1 tab(s) by mouth once a day  -- Indication: For aspirin     tamsulosin 0.4 mg oral capsule  -- 1 cap(s) by mouth once a day (at bedtime)  -- Indication: For prostate     amiodarone 200 mg oral tablet  -- 1 tab(s) by mouth once a day in the AM  -- Indication: For heart     levETIRAcetam 500 mg oral tablet  -- 1 tab(s) by mouth 2 times a day  -- Indication: For anticonvulsant     simvastatin 20 mg oral tablet  -- 1 tab(s) by mouth once a day (at bedtime)  -- Indication: For cholesterol     Metoprolol Succinate  mg oral tablet, extended release  -- 1 tab(s) by mouth 2 times a day  -- It is very important that you take or use this exactly as directed.  Do not skip doses or discontinue unless directed by your doctor.  May cause drowsiness.  Alcohol may intensify this effect.  Use care when operating dangerous machinery.  Some non-prescription drugs may aggravate your condition.  Read all labels carefully.  If a warning appears, check with your doctor before taking.  Swallow whole.  Do not crush.  Take with food or milk.  This drug may impair the ability to drive or operate machinery.  Use care until you become familiar with its effects.    -- Indication: For blood pressure    Keflex 500 mg oral capsule  -- 1 cap(s) by mouth 3 times a day   -- Finish all this medication unless otherwise directed by prescriber.    -- Indication: For antibiotic     furosemide 20 mg oral tablet  -- 1 tab(s) by mouth once a day  -- Indication: For water pill

## 2018-05-16 NOTE — CONSULT NOTE ADULT - SUBJECTIVE AND OBJECTIVE BOX
Patient is a 89y old  Male who presents with a chief complaint of elective TURBT (16 May 2018 09:02)      HPI:  89 year old male with PMH of TIA, seizure disorder, A.Fib (no on AC), HTN, HLD, systolic HF presents for scheduled TURBT. Patient recently admitted 5/7-5/10 with CHF for cardiac evaluation. Patient known to Dr. Sims, CT and cysto c/w bladder tumor of right wall. Denies fevers, chills, nausea, vomiting, SOB, chest pain, hematuria or dysuria. States he can walk two blocks without SOB. Patient cardiac cleared by cardiology Dr. Pearce. Recent stress test done and PPM checked two weeks ago. Patient previously taking Eliquis for A.Fib although d/c'ed 2/2 hematuria. Currently taking only ASA, last dose yesterday (16 May 2018 09:02)           *****PAST MEDICAL / Surgical  HISTORY:  PAST MEDICAL & SURGICAL HISTORY:  Pacemaker  HF (heart failure)  Seizure  Prostate cancer  BPH (benign prostatic hypertrophy)  PAF (paroxysmal atrial fibrillation)  TIA (Transient Ischemic Attack)  Prostate Ca: s/p seed implant  Hyperlipemia  HTN (Hypertension)  H/O prostate cancer: seed implant  No Past Surgical History           *****FAMILY HISTORY:  FAMILY HISTORY:  No pertinent family history in first degree relatives           *****SOCIAL HISTORY:  Alcohol: None  Smoking: None         *****ALLERGIES:   Allergies    ACE inhibitors (Other)    Intolerances             *****MEDICATIONS:  MEDICATIONS  (STANDING):  ceFAZolin   IVPB 2000 milliGRAM(s) IV Intermittent once  sodium chloride 0.9% lock flush 3 milliLiter(s) IV Push every 8 hours    MEDICATIONS  (PRN):           *****REVIEW OF SYSTEM:  GEN: no fever, no chills, no pain  RESP: no SOB, no cough, no sputum  CVS: no chest pain, no palpitations, no edema  GI: no abdominal pain, no nausea, no vomiting, no constipation, no diarrhea  : no dysurea, no frequency, no hematurea  Neuro: no headache, no dizziness  PSYCH: no anxiety, no depression  Derm : no itching, no rash         *****VITAL SIGNS:  T(C): --  HR: --  BP: --  RR: --  SpO2: --  Wt(kg): --           *****PHYSICAL EXAM:  GEN: A&O X 3 , NAD , comfortable  HEENT: NCAT, PERRL, MMM, hearing intact  Neck: supple , no JVD  CVS: S1S2 , regular , No M/R/G appreciated  PULM: CTA B/L,  no W/R/R appreciated  ABD.: soft. non tender, non distended,  bowel sounds present  Extrem: intact pulses , no edema   Derm: No rash , no ecchymoses  PSYCH : normal mood,  no delusion not anxious         *****LAB AND IMAGING:                                                  [All pertinent recent Imaging reports reviewed]         *****A S S E S S M E N T   A N D   P L A N :  89M with chronic systolic chf and AF adm for elective TURBT  on amio for Afib  has been off ac for hematuria/bladder tumor   now for elective resection  would put on tele post op  beta blocker per hr/bp  lasix for O=I  PPM interrogated 2 weeks ago, report being faxed from office  stress test this week showed no ischemia, EF 36%  chronic systolic CHF with no recent CP/SOB, pt is optimized for surgery. He is moderate risk but benefit outweighs risk.      ___________________________  Will follow with you.  Thank you,  DALILA Robertson D.O.

## 2018-05-17 VITALS
OXYGEN SATURATION: 98 % | HEART RATE: 82 BPM | RESPIRATION RATE: 15 BRPM | DIASTOLIC BLOOD PRESSURE: 74 MMHG | SYSTOLIC BLOOD PRESSURE: 154 MMHG

## 2018-05-17 PROCEDURE — 88341 IMHCHEM/IMCYTCHM EA ADD ANTB: CPT

## 2018-05-17 PROCEDURE — 88304 TISSUE EXAM BY PATHOLOGIST: CPT

## 2018-05-17 PROCEDURE — 52240 CYSTOSCOPY AND TREATMENT: CPT

## 2018-05-17 PROCEDURE — 88342 IMHCHEM/IMCYTCHM 1ST ANTB: CPT

## 2018-05-17 PROCEDURE — C1758: CPT

## 2018-05-17 PROCEDURE — C1769: CPT

## 2018-05-17 RX ADMIN — AMIODARONE HYDROCHLORIDE 200 MILLIGRAM(S): 400 TABLET ORAL at 05:59

## 2018-05-17 RX ADMIN — LEVETIRACETAM 500 MILLIGRAM(S): 250 TABLET, FILM COATED ORAL at 06:00

## 2018-05-17 RX ADMIN — Medication 500 MILLIGRAM(S): at 06:00

## 2018-05-17 RX ADMIN — SODIUM CHLORIDE 75 MILLILITER(S): 9 INJECTION, SOLUTION INTRAVENOUS at 04:01

## 2018-05-17 NOTE — PROGRESS NOTE ADULT - SUBJECTIVE AND OBJECTIVE BOX
Subjective  No acute complaints  denies fever, chills, or pain overnight  Objective    Vital signs  T(F): , Max: 97.5 (05-16-18 @ 19:30)  HR: 81 (05-17-18 @ 04:00)  BP: 141/70 (05-17-18 @ 04:00)  SpO2: 96% (05-17-18 @ 04:00)  [place] - 475 - clear yellow    Gen: NAD  Abd: Soft, non-tender  : light, pink, secure

## 2018-05-17 NOTE — PROGRESS NOTE ADULT - ASSESSMENT
This is a 89 y/o M POD #0 s/p TURBT  - reg diet  - miranda to leg bag  - dispo planning with 3 days of antibiotics (keflex)  - f/u with Dr. Ibarra on Monday for miranda removal [phone]

## 2018-05-17 NOTE — PROGRESS NOTE ADULT - SUBJECTIVE AND OBJECTIVE BOX
- Patient seen and examined.  - In summary, patient is a 89 year old man who presented for elective TURBT (16 May 2018 09:02)  - Today, patient has m inor post-op discomfort.         *****MEDICATIONS:    MEDICATIONS  (STANDING):  amiodarone    Tablet 200 milliGRAM(s) Oral daily  aspirin enteric coated 81 milliGRAM(s) Oral daily  cephalexin 500 milliGRAM(s) Oral three times a day  lactated ringers. 1000 milliLiter(s) (75 mL/Hr) IV Continuous <Continuous>  levETIRAcetam 500 milliGRAM(s) Oral two times a day  simvastatin 20 milliGRAM(s) Oral at bedtime  tamsulosin 0.4 milliGRAM(s) Oral at bedtime    MEDICATIONS  (PRN):  acetaminophen   Tablet. 650 milliGRAM(s) Oral every 6 hours PRN Moderate Pain (4 - 6)  benzocaine 15 mG/menthol 3.6 mG Lozenge 1 Lozenge Oral every 4 hours PRN Sore Throat  HYDROmorphone  Injectable 0.25 milliGRAM(s) IV Push every 10 minutes PRN Moderate Pain  lidocaine 2% Gel 1 Application(s) Topical every 3 hours PRN penile/miranda pain  ondansetron Injectable 4 milliGRAM(s) IV Push every 8 hours PRN Nausea and/or Vomiting           ***** REVIEW OF SYSTEM:  GEN: no fever, no chills, no pain  RESP: no SOB, no cough, no sputum  CVS: no chest pain, no palpitations, no edema  GI: no abdominal pain, no nausea, no vomiting, no constipation, no diarrhea  : no dysuria, no frequency  NEURO: no headache, no dizziness  PSYCH: no depression, not anxious  Derm : no itching, no rash         ***** VITAL SIGNS:  T(F): 97.9 (05-17-18 @ 07:23), Max: 97.9 (05-17-18 @ 07:23)  HR: 82 (05-17-18 @ 08:00) (67 - 103)  BP: 154/74 (05-17-18 @ 08:00) (133/72 - 176/95)  RR: 15 (05-17-18 @ 08:00) (14 - 17)  SpO2: 98% (05-17-18 @ 08:00) (95% - 100%)  Wt(kg): --  ,   I&O's Summary    16 May 2018 07:01  -  17 May 2018 07:00  --------------------------------------------------------  IN: 1245 mL / OUT: 1525 mL / NET: -280 mL    17 May 2018 07:01  -  17 May 2018 11:32  --------------------------------------------------------  IN: 75 mL / OUT: 200 mL / NET: -125 mL             *****PHYSICAL EXAM:  GEN: A&O X 3 , NAD , comfortable  HEENT: NCAT, EOMI, MMM, no icterus  NECK: Supple, No JVD  CVS: S1S2 , regular , No M/R/G appreciated  PULM: CTA B/L,  no W/R/R appreciated  ABD.: soft. non tender, non distended,  bowel sounds present  Extrem: intact pulses , no edema noted  Derm: No rash or ecchymosis noted  PSYCH: normal mood, no depression, not anxious         *****LAB AND IMAGING:                                         [All pertinent recent Imaging/Reports reviewed]         *****A S S E S S M E N T   A N D   P L A N :    89M with chronic systolic chf and AF adm for elective TURBT  cont amio for Afib  maintain off ac for hematuria/bladder tumor  post op per    beta blocker per hr/bp  lasix for O=I  stress test this week showed no ischemia, EF 36%  f/u Dr Trejo after DC      __________________________  DALILA Robertson D.O.

## 2018-05-19 ENCOUNTER — EMERGENCY (EMERGENCY)
Facility: HOSPITAL | Age: 83
LOS: 1 days | Discharge: HOME | End: 2018-05-19
Attending: EMERGENCY MEDICINE
Payer: MEDICARE

## 2018-05-19 VITALS
DIASTOLIC BLOOD PRESSURE: 61 MMHG | RESPIRATION RATE: 17 BRPM | HEART RATE: 84 BPM | OXYGEN SATURATION: 96 % | WEIGHT: 175.05 LBS | TEMPERATURE: 98 F | HEIGHT: 70 IN | SYSTOLIC BLOOD PRESSURE: 96 MMHG

## 2018-05-19 DIAGNOSIS — Z85.46 PERSONAL HISTORY OF MALIGNANT NEOPLASM OF PROSTATE: Chronic | ICD-10-CM

## 2018-05-19 PROCEDURE — 73560 X-RAY EXAM OF KNEE 1 OR 2: CPT

## 2018-05-19 PROCEDURE — 99284 EMERGENCY DEPT VISIT MOD MDM: CPT | Mod: 25

## 2018-05-19 PROCEDURE — 99284 EMERGENCY DEPT VISIT MOD MDM: CPT

## 2018-05-19 PROCEDURE — 93971 EXTREMITY STUDY: CPT

## 2018-05-19 NOTE — ED PROVIDER NOTE - ATTENDING CONTRIBUTION TO CARE
89 yo M presents with L knee pain since yesterday, worse today. no pain at rest, worse with ambulation. pain is within the knee. no injury. minimal associated swelling. patient is s/p bladder procedure last week. no f/ch.  PE with medial and lateral joint line TTP. no knee bony TTP. I do not appreciate any swelling. no erythema/wamrth to touch. + pain elicited with valgus and varus stress on the knee. full rom of the knee without pain. no calf TTP or swelling. 88 yo M presents with worsening L knee pain since yesterday, worse today. he has chronic L knee pain and swelling, but in the past 2 days it is worse. no pain at rest, worse with movement of the knee and with ambulation. pain is within the knee. no injury. minimal associated swelling. patient is s/p bladder procedure last week. no f/ch.  PE with medial and lateral joint line TTP. no knee bony TTP. mild swelling of L knee. no erythema/wamrth to touch. + pain elicited with valgus and varus stress on the knee. full rom of the knee without pain. no calf TTP or swelling. 2+ PP, NVI. full rom of the knee, knee joint stable.   no s/s concerning for septic arthritits,  ed workup shows US negative for DVT. xray with no acute fx. there is chondrocalcinosis. patient treated with pain meds in the ER. on pt re-eval he reported improvement of his pain. ambulating around the ER without an difficulty. knee was wrapped in ACE bandage for support. pt discharged with instructions to rest, ice daily, ace bandage for support, motrin/tylenol for pain, and f/u with ortho in 1-2 days for repeat eval and further management    The patient was discharged from the ED in stable condition. All results of today's workup were discussed with the patient and all questions/concerns were addressed. All discharge instructions were thoroughly discussed with the patient, as well as important warning signs and new/ worsening symptoms which should necessitate patient's immediate return to the ED. The patient is agreeable with discharge and expresses full understanding of all instructions given.

## 2018-05-19 NOTE — ED PROVIDER NOTE - OBJECTIVE STATEMENT
89 yo M w/ PMH of 88 yo M w/ PMH of TIA, seizure disorder, A.Fib (s/p pacemaker placement, not on AC), HTN, HLD, systolic HF, s/p TURBT on 05/16/2018 with miranda catheter in place, presenting w/ chief complaint of L knee pain and swelling since surgery.  Patient denies fevers, chills, nausea, vomiting, SOB, chest pain, leg swelling, weakness, dizziness, lightheadedness, numbness, tingling. Patient also reports that L knee is chronically more swollen than right knee.    Uro: Dr. Sims 89 yo M w/ PMH of TIA, seizure disorder, A.Fib (s/p pacemaker placement, not on AC), HTN, HLD, systolic HF, s/p TURBT on 05/16/2018 with miranda catheter in place, presenting w/ chief complaint of L knee pain and swelling for <1 week.  Patient denies fevers, chills, nausea, vomiting, SOB, chest pain, leg swelling, weakness, dizziness, lightheadedness, numbness, tingling. Patient reports chronic L knee pain and swelling but in the past week it is worse.      Uro: Dr. Sims

## 2018-05-19 NOTE — ED ADULT NURSE NOTE - OBJECTIVE STATEMENT
Pt presents to ED awake and alert, accompanied by family c/o left knee pain x 1 day. Pain is only present with ambulation, disappears at rest and is associated with minimal swelling. Denies fall or injury to the knee. No fevers or chills reported. Family states pt had a bladder procedure last week. [place] draining bloody urine to leg bag. ROM is intact but pt does have pain with range of motion. Left knee does not appear to be more swollen than the right but family states it is. +1 pitting edema noted. No skin break or obvious deformity. NO warmth or redness noted. Respirations even and unlabored. Skin color normal for race.

## 2018-05-19 NOTE — ED PROVIDER NOTE - MEDICAL DECISION MAKING DETAILS
88 yo M w/ chief complaint of L knee pain and swelling x 4 days s/p TURBT surgery. Knee is not red, or hot. Only mildly pain on medial side. Mild swelling, although knee is chronically swollen per patient. Will obtain knee xrays and DVT study. Reassess.

## 2018-05-19 NOTE — ED PROVIDER NOTE - CHIEF COMPLAINT
The patient is a 89y Male complaining of The patient is a 89y Male complaining of L knee pain and swelling.

## 2018-05-19 NOTE — ED PROVIDER NOTE - CONSTITUTIONAL, MLM
normal... Well appearing, well nourished, awake, alert, oriented to person, place, time/situation and in no apparent distress. Has miranda catheter in place draining red urine.

## 2018-05-19 NOTE — ED PROVIDER NOTE - MUSCULOSKELETAL, MLM
Spine appears normal, range of motion is not limited. Mild pain on palpation of medial left knee. Swollen compared to R knee. Not red or hot. Spine appears normal, range of motion is not limited. Mild pain on palpation of medial left knee. mild Swelling compared to R knee. Not red or hot.

## 2018-05-20 VITALS
DIASTOLIC BLOOD PRESSURE: 92 MMHG | OXYGEN SATURATION: 98 % | RESPIRATION RATE: 16 BRPM | SYSTOLIC BLOOD PRESSURE: 148 MMHG | HEART RATE: 99 BPM | TEMPERATURE: 98 F

## 2018-05-20 PROCEDURE — 73560 X-RAY EXAM OF KNEE 1 OR 2: CPT | Mod: 26,LT

## 2018-05-20 PROCEDURE — 93971 EXTREMITY STUDY: CPT | Mod: 26

## 2018-05-20 NOTE — H&P CARDIOLOGY - TEMPERATURE IN FAHRENHEIT (DEGREES F)
76 yo female, morbidly obese, in the Er with left foot and left ankle pain, mostly to latero-dorsal aspect. No recent fall,  no numbness or tingling, no discolorations on exam, NROM but with tenderness. Xray done. results discussed with radiology on call and with pt. No evidence of acute fx. plan : d/c home with pain control ankle support, walker, recommend an out pt podiatry or ortho f/u. 97.8

## 2018-05-23 LAB — SURGICAL PATHOLOGY STUDY: SIGNIFICANT CHANGE UP

## 2018-06-01 ENCOUNTER — OUTPATIENT (OUTPATIENT)
Dept: OUTPATIENT SERVICES | Facility: HOSPITAL | Age: 83
LOS: 1 days | End: 2018-06-01
Payer: MEDICARE

## 2018-06-01 ENCOUNTER — INPATIENT (INPATIENT)
Facility: HOSPITAL | Age: 83
LOS: 6 days | Discharge: NURSING, REHAB OR LONG-TERM CARE | DRG: 920 | End: 2018-06-08
Attending: INTERNAL MEDICINE | Admitting: INTERNAL MEDICINE
Payer: MEDICARE

## 2018-06-01 ENCOUNTER — APPOINTMENT (OUTPATIENT)
Dept: CT IMAGING | Facility: IMAGING CENTER | Age: 83
End: 2018-06-01
Payer: MEDICARE

## 2018-06-01 VITALS
TEMPERATURE: 99 F | OXYGEN SATURATION: 98 % | RESPIRATION RATE: 18 BRPM | WEIGHT: 160.06 LBS | HEART RATE: 103 BPM | SYSTOLIC BLOOD PRESSURE: 139 MMHG | DIASTOLIC BLOOD PRESSURE: 85 MMHG

## 2018-06-01 DIAGNOSIS — Z85.46 PERSONAL HISTORY OF MALIGNANT NEOPLASM OF PROSTATE: Chronic | ICD-10-CM

## 2018-06-01 DIAGNOSIS — Z00.8 ENCOUNTER FOR OTHER GENERAL EXAMINATION: ICD-10-CM

## 2018-06-01 DIAGNOSIS — R31.9 HEMATURIA, UNSPECIFIED: ICD-10-CM

## 2018-06-01 DIAGNOSIS — R31.0 GROSS HEMATURIA: ICD-10-CM

## 2018-06-01 LAB
ALBUMIN SERPL ELPH-MCNC: 4.3 G/DL — SIGNIFICANT CHANGE UP (ref 3.3–5)
ALP SERPL-CCNC: 64 U/L — SIGNIFICANT CHANGE UP (ref 40–120)
ALT FLD-CCNC: 22 U/L — SIGNIFICANT CHANGE UP (ref 10–45)
ANION GAP SERPL CALC-SCNC: 12 MMOL/L — SIGNIFICANT CHANGE UP (ref 5–17)
APPEARANCE UR: ABNORMAL
APTT BLD: 37.3 SEC — SIGNIFICANT CHANGE UP (ref 27.5–37.4)
AST SERPL-CCNC: 43 U/L — HIGH (ref 10–40)
BASOPHILS # BLD AUTO: 0 K/UL — SIGNIFICANT CHANGE UP (ref 0–0.2)
BASOPHILS NFR BLD AUTO: 1 % — SIGNIFICANT CHANGE UP (ref 0–2)
BILIRUB SERPL-MCNC: 0.3 MG/DL — SIGNIFICANT CHANGE UP (ref 0.2–1.2)
BILIRUB UR-MCNC: NEGATIVE — SIGNIFICANT CHANGE UP
BUN SERPL-MCNC: 16 MG/DL — SIGNIFICANT CHANGE UP (ref 7–23)
CALCIUM SERPL-MCNC: 9.8 MG/DL — SIGNIFICANT CHANGE UP (ref 8.4–10.5)
CHLORIDE SERPL-SCNC: 99 MMOL/L — SIGNIFICANT CHANGE UP (ref 96–108)
CO2 SERPL-SCNC: 30 MMOL/L — SIGNIFICANT CHANGE UP (ref 22–31)
COLOR SPEC: ABNORMAL
CREAT SERPL-MCNC: 1.04 MG/DL — SIGNIFICANT CHANGE UP (ref 0.5–1.3)
DIFF PNL FLD: ABNORMAL
EOSINOPHIL # BLD AUTO: 0.2 K/UL — SIGNIFICANT CHANGE UP (ref 0–0.5)
EOSINOPHIL NFR BLD AUTO: 3.8 % — SIGNIFICANT CHANGE UP (ref 0–6)
GAS PNL BLDV: SIGNIFICANT CHANGE UP
GLUCOSE SERPL-MCNC: 95 MG/DL — SIGNIFICANT CHANGE UP (ref 70–99)
GLUCOSE UR QL: NEGATIVE — SIGNIFICANT CHANGE UP
HCT VFR BLD CALC: 47.2 % — SIGNIFICANT CHANGE UP (ref 39–50)
HGB BLD-MCNC: 15.2 G/DL — SIGNIFICANT CHANGE UP (ref 13–17)
INR BLD: 1.26 RATIO — HIGH (ref 0.88–1.16)
KETONES UR-MCNC: NEGATIVE — SIGNIFICANT CHANGE UP
LEUKOCYTE ESTERASE UR-ACNC: ABNORMAL
LYMPHOCYTES # BLD AUTO: 2.3 K/UL — SIGNIFICANT CHANGE UP (ref 1–3.3)
LYMPHOCYTES # BLD AUTO: 47.1 % — HIGH (ref 13–44)
MCHC RBC-ENTMCNC: 29.6 PG — SIGNIFICANT CHANGE UP (ref 27–34)
MCHC RBC-ENTMCNC: 32.2 GM/DL — SIGNIFICANT CHANGE UP (ref 32–36)
MCV RBC AUTO: 92.2 FL — SIGNIFICANT CHANGE UP (ref 80–100)
MONOCYTES # BLD AUTO: 0.7 K/UL — SIGNIFICANT CHANGE UP (ref 0–0.9)
MONOCYTES NFR BLD AUTO: 13.8 % — SIGNIFICANT CHANGE UP (ref 2–14)
NEUTROPHILS # BLD AUTO: 1.7 K/UL — LOW (ref 1.8–7.4)
NEUTROPHILS NFR BLD AUTO: 34.3 % — LOW (ref 43–77)
NITRITE UR-MCNC: NEGATIVE — SIGNIFICANT CHANGE UP
PH UR: 7 — SIGNIFICANT CHANGE UP (ref 5–8)
PLATELET # BLD AUTO: 318 K/UL — SIGNIFICANT CHANGE UP (ref 150–400)
POTASSIUM SERPL-MCNC: 5.3 MMOL/L — SIGNIFICANT CHANGE UP (ref 3.5–5.3)
POTASSIUM SERPL-SCNC: 5.3 MMOL/L — SIGNIFICANT CHANGE UP (ref 3.5–5.3)
PROT SERPL-MCNC: 8.7 G/DL — HIGH (ref 6–8.3)
PROT UR-MCNC: 100 MG/DL
PROTHROM AB SERPL-ACNC: 13.7 SEC — HIGH (ref 9.8–12.7)
RBC # BLD: 5.11 M/UL — SIGNIFICANT CHANGE UP (ref 4.2–5.8)
RBC # FLD: 13.4 % — SIGNIFICANT CHANGE UP (ref 10.3–14.5)
RBC CASTS # UR COMP ASSIST: >50 /HPF (ref 0–2)
SODIUM SERPL-SCNC: 141 MMOL/L — SIGNIFICANT CHANGE UP (ref 135–145)
SP GR SPEC: 1.01 — SIGNIFICANT CHANGE UP (ref 1.01–1.02)
UROBILINOGEN FLD QL: NEGATIVE — SIGNIFICANT CHANGE UP
WBC # BLD: 4.8 K/UL — SIGNIFICANT CHANGE UP (ref 3.8–10.5)
WBC # FLD AUTO: 4.8 K/UL — SIGNIFICANT CHANGE UP (ref 3.8–10.5)
WBC UR QL: SIGNIFICANT CHANGE UP /HPF (ref 0–5)

## 2018-06-01 PROCEDURE — 71250 CT THORAX DX C-: CPT

## 2018-06-01 PROCEDURE — 71250 CT THORAX DX C-: CPT | Mod: 26

## 2018-06-01 PROCEDURE — 99285 EMERGENCY DEPT VISIT HI MDM: CPT

## 2018-06-01 RX ORDER — MIRTAZAPINE 45 MG/1
15 TABLET, ORALLY DISINTEGRATING ORAL ONCE
Qty: 0 | Refills: 0 | Status: COMPLETED | OUTPATIENT
Start: 2018-06-01 | End: 2018-06-01

## 2018-06-01 RX ORDER — FUROSEMIDE 40 MG
20 TABLET ORAL DAILY
Qty: 0 | Refills: 0 | Status: DISCONTINUED | OUTPATIENT
Start: 2018-06-01 | End: 2018-06-08

## 2018-06-01 RX ORDER — SIMVASTATIN 20 MG/1
20 TABLET, FILM COATED ORAL AT BEDTIME
Qty: 0 | Refills: 0 | Status: DISCONTINUED | OUTPATIENT
Start: 2018-06-01 | End: 2018-06-08

## 2018-06-01 RX ORDER — TAMSULOSIN HYDROCHLORIDE 0.4 MG/1
0.4 CAPSULE ORAL AT BEDTIME
Qty: 0 | Refills: 0 | Status: DISCONTINUED | OUTPATIENT
Start: 2018-06-01 | End: 2018-06-08

## 2018-06-01 RX ORDER — DIGOXIN 250 MCG
0.12 TABLET ORAL DAILY
Qty: 0 | Refills: 0 | Status: DISCONTINUED | OUTPATIENT
Start: 2018-06-01 | End: 2018-06-08

## 2018-06-01 RX ORDER — LEVETIRACETAM 250 MG/1
500 TABLET, FILM COATED ORAL
Qty: 0 | Refills: 0 | Status: DISCONTINUED | OUTPATIENT
Start: 2018-06-01 | End: 2018-06-08

## 2018-06-01 RX ORDER — AMIODARONE HYDROCHLORIDE 400 MG/1
1 TABLET ORAL
Qty: 0 | Refills: 0 | COMMUNITY

## 2018-06-01 RX ORDER — METOPROLOL TARTRATE 50 MG
100 TABLET ORAL DAILY
Qty: 0 | Refills: 0 | Status: DISCONTINUED | OUTPATIENT
Start: 2018-06-01 | End: 2018-06-08

## 2018-06-01 RX ADMIN — LEVETIRACETAM 500 MILLIGRAM(S): 250 TABLET, FILM COATED ORAL at 23:45

## 2018-06-01 RX ADMIN — SIMVASTATIN 20 MILLIGRAM(S): 20 TABLET, FILM COATED ORAL at 23:45

## 2018-06-01 RX ADMIN — MIRTAZAPINE 15 MILLIGRAM(S): 45 TABLET, ORALLY DISINTEGRATING ORAL at 23:45

## 2018-06-01 RX ADMIN — TAMSULOSIN HYDROCHLORIDE 0.4 MILLIGRAM(S): 0.4 CAPSULE ORAL at 23:45

## 2018-06-01 NOTE — H&P ADULT - ASSESSMENT
pt with hematuria s/p tubrt squamosa cell ca, a.fib with rvr  will adjust cardiac meds  check ct chest  f/u h/h  amio dcd  continue remeron  urology for bladder irrigation  hold AC

## 2018-06-01 NOTE — ED ADULT NURSE NOTE - OBJECTIVE STATEMENT
88 yo M presents to ED A+Ox3 accompanied by his family c/o hematuria. As per family, on May 16th  pt. had "a bladder tumor removed", was d/c from hospital the following day with a miranda that was placed "because of the procedure." Family states miranda was removed by his urologist, family states two days ago his  urine became bloody and has "become much darker" and was told by urologist to come to ED for further evaluation. Family states patient is on Eliquis for atrial fibrillation, stopped medication prior to surgery but took a dose this past Wednesday but stopped taking the medication again after he began to have hematuria. Pt. denies chest pain, SOB, back pain, N/V, fever, chills, abdominal pain. Abdomen soft, nondistended, nontender. Breathing unlabored on RA. Skin warm pink and dry. Pt. reports mild dysuria and "going a little more than usual." Family at bedside. Comfort and safety measures in place.

## 2018-06-01 NOTE — CONSULT NOTE ADULT - ASSESSMENT
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

## 2018-06-01 NOTE — CONSULT NOTE ADULT - ASSESSMENT
pt  with htn, tia,  afib, ppm, cardiomyopathy      ca  bladder  s/p  turbt  last  month,  now   with gross hematuria  urology  f/p   may  need  bladder  irrigation   hold  a/c   follow  labs    wife  at  bedside  urology/  dr scott pt  with htn, tia,  afib, ppm, cardiomyopathy      ca  bladder  s/p  turbt  last  month,  now   with gross hematuria  urology  f/p   may  need  bladder  irrigation   hold  a/c   follow  labs    wife  at  bedside  urology/  dr scott  on amio/ lasix/ asa/ keppra/ beta  blocker

## 2018-06-01 NOTE — ED PROVIDER NOTE - PHYSICAL EXAMINATION
***GEN - well appearing; NAD   ***HEAD - NC/AT  ***EYES/NOSE - PEERL, EOMI, mucous membranes moist, no discharge   ***THROAT: Oral cavity and pharynx normal. No inflammation, swelling, exudate, or lesions.    ***NECK: supple, non-tender no lymphadenopathy  ***PULMONARY - CTA b/l, symmetric breath sounds.   ***CARDIAC- s1s2, RRR, no murmur  ***ABDOMEN - +BS, ND, NT, soft, no guarding, no rebound, no organomegaly  ***BACK - no CVA tenderness, Normal  spine, no spinal TTP  ***EXTREMITIES - symmetric pulses, 2+ dp, capillary refill < 2 seconds, no clubbing, no cyanosis, no edema   ***SKIN - warm, dry, intact, no rash or bruising   ***NEUROLOGIC - a&o x3, CN 3-12 intact, sensation nl, motor 5/5   *** - gross hematuria in urine cup.  Normal external genitalia, no urethral discharge.

## 2018-06-01 NOTE — ED PROVIDER NOTE - NS ED ROS FT
Constitutional: No fever or chills  Eyes: No visual changes  HEENT: No throat pain  CV: No chest pain  Resp: No SOB no cough  GI: No abd pain, nausea or vomiting  : as per hpi  MSK: No musculoskeletal pain  Skin: No rash  Neuro: No headache

## 2018-06-01 NOTE — ED PROVIDER NOTE - ATTENDING CONTRIBUTION TO CARE
Attending MD Montano.  Agree with above.  Pt is an 89 yr old male s/p TURBT on 5/16 with Derek Ibarra.  Hx of bladder CA.  Was on eliquis for Afib but discontinued today.  Pt presents currently with urinary incontinence when he stands and burning wiht urination, gross hematuria.  Sent to NS by urology (Dr. Lawler) whom he saw in office today.  Well appearing, no current CP/SOB/light-headedness.  Afebrile.

## 2018-06-01 NOTE — H&P ADULT - HISTORY OF PRESENT ILLNESS
CHIEF COMPLAINT:Patient is a 89y old  Male who presents with a chief complaint of severe hematuria since yesterday.    HPI:pt is a 89 yo male with hx of htn, a.fib ,sss, s/p ppm, s/p turbt, who presented to er with hematuria after taking a dose of eliquis 2.5 mg.  no chest pain,+ palpitation in a.fib with rvr.      PAST MEDICAL & SURGICAL HISTORY:  Pacemaker  HF (heart failure)  Seizure  Prostate cancer  BPH (benign prostatic hypertrophy)  PAF (paroxysmal atrial fibrillation)  TIA (Transient Ischemic Attack)  Prostate Ca: s/p seed implant  Hyperlipemia  HTN (Hypertension)  H/O prostate cancer: seed implant  No Past Surgical History      MEDICATIONS  (STANDING):    MEDICATIONS  (PRN):      FAMILY HISTORY:  No pertinent family history in first degree relatives      SOCIAL HISTORY:    [ ] Non-smoker  [ ] Smoker  [ ] Alcohol    Allergies    ACE inhibitors (Other)    Intolerances    	    REVIEW OF SYSTEMS:  CONSTITUTIONAL: No fever, weight loss, or fatigue  EYES: No eye pain, visual disturbances, or discharge  ENT:  No difficulty hearing, tinnitus, vertigo; No sinus or throat pain  NECK: No pain or stiffness  RESPIRATORY: No cough, wheezing, chills or hemoptysis; No Shortness of Breath  CARDIOVASCULAR: No chest pain, palpitations, passing out, dizziness, or leg swelling  GASTROINTESTINAL: No abdominal or epigastric pain. No nausea, vomiting, or hematemesis; No diarrhea or constipation. No melena or hematochezia.  GENITOURINARY: No dysuria, frequency, +  hematuria,   NEUROLOGICAL: No headaches, memory loss, loss of strength, numbness, or tremors  SKIN: No itching, burning, rashes, or lesions   LYMPH Nodes: No enlarged glands  ENDOCRINE: No heat or cold intolerance; No hair loss  MUSCULOSKELETAL: No joint pain or swelling; No muscle, back, or extremity pain  PSYCHIATRIC: No depression, anxiety, mood swings, or difficulty sleeping  HEME/LYMPH: No easy bruising, or bleeding gums  ALLERGY AND IMMUNOLOGIC: No hives or eczema	    [ ] All others negative	  [ ] Unable to obtain    PHYSICAL EXAM:  T(C): 36.9 (06-01-18 @ 16:19), Max: 37.3 (06-01-18 @ 14:58)  HR: 103 (06-01-18 @ 16:19) (103 - 103)  BP: 154/94 (06-01-18 @ 16:19) (139/85 - 154/94)  RR: 20 (06-01-18 @ 16:19) (18 - 20)  SpO2: 100% (06-01-18 @ 16:19) (98% - 100%)  Wt(kg): --  I&O's Summary      Appearance: Normal	  HEENT:   Normal oral mucosa, PERRL, EOMI	  Lymphatic: No lymphadenopathy  Cardiovascular: Normal S1 S2, No JVD, + murmurs, No edema  Respiratory: Lungs clear to auscultation	  Psychiatry: A & O x 3, Mood & affect appropriate  Gastrointestinal:  Soft, Non-tender, + BS	  Skin: No rashes, No ecchymoses, No cyanosis	  Neurologic: Non-focal  Extremities: Normal range of motion, No clubbing, cyanosis or edema  Vascular: Peripheral pulses palpable 2+ bilaterally    TELEMETRY: 	    ECG:  	  RADIOLOGY:  OTHER: 	  	  LABS:	 	    CARDIAC MARKERS:                proBNP:   Lipid Profile:   HgA1c:   TSH:       PREVIOUS DIAGNOSTIC TESTING:    [ ] Echocardiogram:  [ ]  Catheterization:  [ ] Stress Test:

## 2018-06-01 NOTE — CONSULT NOTE ADULT - SUBJECTIVE AND OBJECTIVE BOX
Patient is a 89y old  Male who presents with a chief complaint of hematuria/a.fib (01 Jun 2018 17:36)      HPI:  CHIEF COMPLAINT:Patient is a 89y old  Male who presents with a chief complaint of severe hematuria since yesterday.    HPI:pt is a 89 yo male with hx of htn, a.fib ,sss, s/p ppm, s/p turbt, who presented to er with hematuria after taking a dose of eliquis 2.5 mg.  no chest pain,+ palpitation in a.fib with rvr.  Pt was seen by me in the office earlier today and had gross hematuria (dark) and had reported ADAMS. Sent to ER for stat labs and possible bladder irrigation. Pt feeling well now, no pain. Stopped eliquis yesterday.  Voiding without pain.      PAST MEDICAL & SURGICAL HISTORY:  Pacemaker  HF (heart failure)  Seizure  Prostate cancer  BPH (benign prostatic hypertrophy)  PAF (paroxysmal atrial fibrillation)  TIA (Transient Ischemic Attack)  Prostate Ca: s/p seed implant  Hyperlipemia  HTN (Hypertension)  H/O prostate cancer: seed implant  No Past Surgical History      MEDICATIONS  (STANDING):    MEDICATIONS  (PRN):      FAMILY HISTORY:  No pertinent family history in first degree relatives      SOCIAL HISTORY:    [ ] Non-smoker  [ ] Smoker  [ ] Alcohol    Allergies    ACE inhibitors (Other)    Intolerances    	    REVIEW OF SYSTEMS:  CONSTITUTIONAL: No fever, weight loss, or fatigue  EYES: No eye pain, visual disturbances, or discharge  ENT:  No difficulty hearing, tinnitus, vertigo; No sinus or throat pain  NECK: No pain or stiffness  RESPIRATORY: No cough, wheezing, chills or hemoptysis; No Shortness of Breath  CARDIOVASCULAR: No chest pain, palpitations, passing out, dizziness, or leg swelling. +ADAMS  GASTROINTESTINAL: No abdominal or epigastric pain. No nausea, vomiting, or hematemesis; No diarrhea or constipation. No melena or hematochezia.  GENITOURINARY: No dysuria, frequency, +  hematuria,   NEUROLOGICAL: No headaches, memory loss, loss of strength, numbness, or tremors  SKIN: No itching, burning, rashes, or lesions   LYMPH Nodes: No enlarged glands  ENDOCRINE: No heat or cold intolerance; No hair loss  MUSCULOSKELETAL: No joint pain or swelling; No muscle, back, or extremity pain  PSYCHIATRIC: No depression, anxiety, mood swings, or difficulty sleeping  HEME/LYMPH: No easy bruising, or bleeding gums  ALLERGY AND IMMUNOLOGIC: No hives or eczema	    [x ] All others negative	  [ ] Unable to obtain    PHYSICAL EXAM:  T(C): 36.9 (06-01-18 @ 16:19), Max: 37.3 (06-01-18 @ 14:58)  HR: 103 (06-01-18 @ 16:19) (103 - 103)  BP: 154/94 (06-01-18 @ 16:19) (139/85 - 154/94)  RR: 20 (06-01-18 @ 16:19) (18 - 20)  SpO2: 100% (06-01-18 @ 16:19) (98% - 100%)  Wt(kg): --  I&O's Summary      Appearance: Normal	  HEENT:   Normal oral mucosa, PERRL, EOMI	  Lymphatic: No lymphadenopathy  Cardiovascular: Normal S1 S2, No JVD, + murmurs, No edema  Respiratory: Lungs clear to auscultation	  Psychiatry: A & O x 3, Mood & affect appropriate  Gastrointestinal:  Soft, Non-tender, + BS	  Skin: No rashes, No ecchymoses, No cyanosis	  Neurologic: Non-focal  Extremities: Normal range of motion, No clubbing, cyanosis or edema  Vascular: Peripheral pulses palpable 2+ bilaterally              proBNP:   Lipid Profile:   HgA1c:   TSH:       PREVIOUS DIAGNOSTIC TESTING:    [ ] Echocardiogram:  [ ]  Catheterization:  [ ] Stress Test: (01 Jun 2018 17:36)      PAST MEDICAL & SURGICAL HISTORY:  Pacemaker  HF (heart failure)  Seizure  Prostate cancer  BPH (benign prostatic hypertrophy)  PAF (paroxysmal atrial fibrillation)  TIA (Transient Ischemic Attack)  Prostate Ca: s/p seed implant  Hyperlipemia  HTN (Hypertension)  H/O prostate cancer: seed implant  No Past Surgical History      REVIEW OF SYSTEMS:    CONSTITUTIONAL: No weakness, fevers or chills  HEENT: No visual changes;  No vertigo or throat pain   ENDO: No sweating, no palpitations  NECK: No pain or stiffness  MUSCULOSKELETAL: No back pain, no joint pain  RESPIRATORY: No cough, wheezing, hemoptysis; No shortness of breath  CARDIOVASCULAR: No chest pain or palpitations  GASTROINTESTINAL: No abdominal or epigastric pain. No nausea, vomiting, or hematemesis; No diarrhea or constipation. No melena or hematochezia.  NEUROLOGICAL: No numbness or weakness  PSYCH: No depression, no mood changes  SKIN: No itching, burning, rashes, or lesions   All other review of systems is negative unless indicated above.    MEDICATIONS  (STANDING):  digoxin     Tablet 0.125 milliGRAM(s) Oral daily  furosemide    Tablet 20 milliGRAM(s) Oral daily  levETIRAcetam 500 milliGRAM(s) Oral two times a day  metoprolol succinate  milliGRAM(s) Oral daily  mirtazapine 15 milliGRAM(s) Oral Once  simvastatin 20 milliGRAM(s) Oral at bedtime  tamsulosin Oral Tab/Cap - Peds 0.4 milliGRAM(s) Oral at bedtime    MEDICATIONS  (PRN):      Allergies    ACE inhibitors (Other)    Intolerances        SOCIAL HISTORY: No illicit drug use    FAMILY HISTORY:  No pertinent family history in first degree relatives      Vital Signs Last 24 Hrs  T(C): 36.9 (01 Jun 2018 16:19), Max: 37.3 (01 Jun 2018 14:58)  T(F): 98.5 (01 Jun 2018 16:19), Max: 99.1 (01 Jun 2018 14:58)  HR: 103 (01 Jun 2018 16:19) (103 - 103)  BP: 154/94 (01 Jun 2018 16:19) (139/85 - 154/94)  BP(mean): --  RR: 20 (01 Jun 2018 16:19) (18 - 20)  SpO2: 100% (01 Jun 2018 16:19) (98% - 100%)    PHYSICAL EXAM:    Constitutional: NAD, well-developed  HEENT: GH[place], EOMI, Normal Hearing, MMM  Neck: No JVD  Back: No CVA tenderness  Respiratory: No accessory respiratory muscle use  Abd: Soft, NT/ND  Extremities: No calf tenderness  Neurological: A/O x 3, no focal deficits  Psychiatric: Normal mood, normal affect  Skin: No rashes    I&O's Summary      LABS:                        15.2   4.8   )-----------( 318      ( 01 Jun 2018 17:40 )             47.2     06-01    141  |  99  |  16  ----------------------------<  95  5.3   |  30  |  1.04    Ca    9.8      01 Jun 2018 17:40    TPro  8.7<H>  /  Alb  4.3  /  TBili  0.3  /  DBili  x   /  AST  43<H>  /  ALT  22  /  AlkPhos  64  06-01    PT/INR - ( 01 Jun 2018 17:40 )   PT: 13.7 sec;   INR: 1.26 ratio         PTT - ( 01 Jun 2018 17:40 )  PTT:37.3 sec

## 2018-06-01 NOTE — ED PROVIDER NOTE - PROGRESS NOTE DETAILS
Seen by Dr. Trejo, will admit for monitoring of afib and further evaluation and tx by urology Attending MD Montano.  Dr. Trejo has seen pt and recommends admission, urology has been called and is to see pt for irrigation and hematuria.

## 2018-06-01 NOTE — CONSULT NOTE ADULT - PROBLEM SELECTOR RECOMMENDATION 9
Hydration  No need for miranda at this time; urine is bloody, but emptying well (bladder was empty on PVR scan earlier today) and appears clearer than earlier this afternoon.  Hold eliquis IF ok with cardiology, preferably until clears further.   No immediate  interventions warranted at this time.

## 2018-06-01 NOTE — ED PROVIDER NOTE - OBJECTIVE STATEMENT
88 yo M w/ PMH of TIA, seizure disorder, A.Fib (s/p pacemaker placement, not on AC), HTN, HLD, systolic HF, s/p TURBT on 05/16/2018 with miranda catheter in place, presenting w/ 88 yo M w/ PMH of TIA, seizure disorder, A.Fib (s/p pacemaker placement, on eliquis, discontinued today) HTN, HLD, systolic HF, bladder ca, s/p TURBT on 05/16/2018 with miranda catheter in place, presenting w/ urinary incontinence and gross hematuria for past day.  Had miranda catheter pulled last week.  Since today, whenever stands up has bladder incontinence.  Denies dysuria, testicle pain, fever, abdominal pain, flank pain, lightheadedness, chest pain, shortness of breath, palpitations, cough, lightheadedness, recent falls or trauma.  Sent to ED by Dr. Lawler (urologist) for bladder irrigation and labs.

## 2018-06-01 NOTE — CONSULT NOTE ADULT - SUBJECTIVE AND OBJECTIVE BOX
HPI:       The patient is a 89y Male complaining of hematuria.	  : 88 yo M w/ PMH of TIA, seizure disorder, A.Fib (s/p pacemaker placement,  HTN, HLD, systolic HF, bladder ca, s/p TURBT on 05/16/2018  presenting w/ urinary incontinence and gross hematuria for past day.   Had miranda catheter pulled last week.  Since today, whenever stands up has bladder incontinence.  Denies dysuria, testicle pain, fever, abdominal pain, flank pain, lightheadedness, chest pain, shortness of breath, palpitations, cough, lightheadedness, recent falls or trauma.  Sent to ED by Dr. Lawler (urologist) for bladder irrigation and labs.	      REVIEW OF SYSTEMS:  CONSTITUTIONAL: No weakness,  no   fevers      RESPIRATORY:  No    shortness of breath  , no  wheezing  CARDIOVASCULAR: No chest pain,   no  palpitations, no  cough  GASTROINTESTINAL: No abdominal  pain, no  vomiting, no  diarrhea  NEUROLOGICAL: No  focal  weakness    Allergies    ACE inhibitors (Other)    Intolerances        CAPILLARY BLOOD GLUCOSE        I&O's Summary      Vital Signs Last 24 Hrs  T(C): 36.9 (01 Jun 2018 16:19), Max: 37.3 (01 Jun 2018 14:58)  T(F): 98.5 (01 Jun 2018 16:19), Max: 99.1 (01 Jun 2018 14:58)  HR: 103 (01 Jun 2018 16:19) (103 - 103)  BP: 154/94 (01 Jun 2018 16:19) (139/85 - 154/94)  BP(mean): --  RR: 20 (01 Jun 2018 16:19) (18 - 20)  SpO2: 100% (01 Jun 2018 16:19) (98% - 100%)  PHYSICAL EXAM:  GENERAL: NAD,   HEAD:  Atraumatic, Normocephalic  EYES: EOMI,  NECK: Supple, No JVD  CHEST/LUNG: Clear to auscultation bilaterally; No wheeze  HEART: Regular rate and rhythm;        murmur  ABDOMEN: Soft, Nontender,   EXTREMITIES:    no     edema  NEUROLOGY:  alert    MEDICATIONS  (STANDING):    MEDICATIONS  (PRN):    LABS:pending                          Thyroid Stimulating Hormone, Serum: 4.14 uIU/mL (04-20 @ 08:14)          Consultant(s) Notes Reviewed:      Care Discussed with Consultants/Other Providers:  Plan:

## 2018-06-02 LAB
HCT VFR BLD CALC: 40.7 % — SIGNIFICANT CHANGE UP (ref 39–50)
HCT VFR BLD CALC: 41.9 % — SIGNIFICANT CHANGE UP (ref 39–50)
HCT VFR BLD CALC: 46.8 % — SIGNIFICANT CHANGE UP (ref 39–50)
HGB BLD-MCNC: 13.5 G/DL — SIGNIFICANT CHANGE UP (ref 13–17)
HGB BLD-MCNC: 13.6 G/DL — SIGNIFICANT CHANGE UP (ref 13–17)
HGB BLD-MCNC: 15.3 G/DL — SIGNIFICANT CHANGE UP (ref 13–17)
MCHC RBC-ENTMCNC: 28.6 PG — SIGNIFICANT CHANGE UP (ref 27–34)
MCHC RBC-ENTMCNC: 30 PG — SIGNIFICANT CHANGE UP (ref 27–34)
MCHC RBC-ENTMCNC: 30.4 PG — SIGNIFICANT CHANGE UP (ref 27–34)
MCHC RBC-ENTMCNC: 32.5 GM/DL — SIGNIFICANT CHANGE UP (ref 32–36)
MCHC RBC-ENTMCNC: 32.7 GM/DL — SIGNIFICANT CHANGE UP (ref 32–36)
MCHC RBC-ENTMCNC: 33 GM/DL — SIGNIFICANT CHANGE UP (ref 32–36)
MCV RBC AUTO: 88.2 FL — SIGNIFICANT CHANGE UP (ref 80–100)
MCV RBC AUTO: 91.7 FL — SIGNIFICANT CHANGE UP (ref 80–100)
MCV RBC AUTO: 92.1 FL — SIGNIFICANT CHANGE UP (ref 80–100)
PLATELET # BLD AUTO: 293 K/UL — SIGNIFICANT CHANGE UP (ref 150–400)
PLATELET # BLD AUTO: 328 K/UL — SIGNIFICANT CHANGE UP (ref 150–400)
PLATELET # BLD AUTO: 354 K/UL — SIGNIFICANT CHANGE UP (ref 150–400)
RBC # BLD: 4.42 M/UL — SIGNIFICANT CHANGE UP (ref 4.2–5.8)
RBC # BLD: 4.75 M/UL — SIGNIFICANT CHANGE UP (ref 4.2–5.8)
RBC # BLD: 5.1 M/UL — SIGNIFICANT CHANGE UP (ref 4.2–5.8)
RBC # FLD: 13.3 % — SIGNIFICANT CHANGE UP (ref 10.3–14.5)
RBC # FLD: 13.3 % — SIGNIFICANT CHANGE UP (ref 10.3–14.5)
RBC # FLD: 14.4 % — SIGNIFICANT CHANGE UP (ref 10.3–14.5)
WBC # BLD: 5.35 K/UL — SIGNIFICANT CHANGE UP (ref 3.8–10.5)
WBC # BLD: 5.8 K/UL — SIGNIFICANT CHANGE UP (ref 3.8–10.5)
WBC # BLD: 6.7 K/UL — SIGNIFICANT CHANGE UP (ref 3.8–10.5)
WBC # FLD AUTO: 5.35 K/UL — SIGNIFICANT CHANGE UP (ref 3.8–10.5)
WBC # FLD AUTO: 5.8 K/UL — SIGNIFICANT CHANGE UP (ref 3.8–10.5)
WBC # FLD AUTO: 6.7 K/UL — SIGNIFICANT CHANGE UP (ref 3.8–10.5)

## 2018-06-02 RX ORDER — MIRTAZAPINE 45 MG/1
15 TABLET, ORALLY DISINTEGRATING ORAL AT BEDTIME
Qty: 0 | Refills: 0 | Status: DISCONTINUED | OUTPATIENT
Start: 2018-06-02 | End: 2018-06-08

## 2018-06-02 RX ADMIN — MIRTAZAPINE 15 MILLIGRAM(S): 45 TABLET, ORALLY DISINTEGRATING ORAL at 21:40

## 2018-06-02 RX ADMIN — LEVETIRACETAM 500 MILLIGRAM(S): 250 TABLET, FILM COATED ORAL at 17:37

## 2018-06-02 RX ADMIN — Medication 20 MILLIGRAM(S): at 05:21

## 2018-06-02 RX ADMIN — TAMSULOSIN HYDROCHLORIDE 0.4 MILLIGRAM(S): 0.4 CAPSULE ORAL at 21:40

## 2018-06-02 RX ADMIN — Medication 100 MILLIGRAM(S): at 05:21

## 2018-06-02 RX ADMIN — LEVETIRACETAM 500 MILLIGRAM(S): 250 TABLET, FILM COATED ORAL at 05:21

## 2018-06-02 RX ADMIN — SIMVASTATIN 20 MILLIGRAM(S): 20 TABLET, FILM COATED ORAL at 21:40

## 2018-06-02 RX ADMIN — Medication 0.12 MILLIGRAM(S): at 05:21

## 2018-06-02 NOTE — PROGRESS NOTE ADULT - SUBJECTIVE AND OBJECTIVE BOX
hematuria  persistent,  resolving slowly  REVIEW OF SYSTEMS:  CONSTITUTIONAL: No weakness,  no   fevers      RESPIRATORY:  No    shortness of breath  , no  wheezing  CARDIOVASCULAR: No chest pain,   no  palpitations, no  cough  GASTROINTESTINAL: No abdominal  pain, no  vomiting, no  diarrhea  NEUROLOGICAL: No  focal  weakness    MEDICATIONS  (STANDING):  digoxin     Tablet 0.125 milliGRAM(s) Oral daily  furosemide    Tablet 20 milliGRAM(s) Oral daily  levETIRAcetam 500 milliGRAM(s) Oral two times a day  metoprolol succinate  milliGRAM(s) Oral daily  simvastatin 20 milliGRAM(s) Oral at bedtime  tamsulosin Oral Tab/Cap - Peds 0.4 milliGRAM(s) Oral at bedtime    MEDICATIONS  (PRN):      Vital Signs Last 24 Hrs  T(C): 36.6 (2018 04:06), Max: 37.3 (2018 14:58)  T(F): 97.8 (2018 04:06), Max: 99.1 (2018 14:58)  HR: 86 (2018 04:06) (86 - 104)  BP: 158/98 (2018 04:06) (139/85 - 177/82)  BP(mean): --  RR: 18 (2018 04:06) (16 - 20)  SpO2: 98% (2018 04:06) (97% - 100%)  CAPILLARY BLOOD GLUCOSE        I&O's Summary    2018 07:01  -  2018 07:00  --------------------------------------------------------  IN: 480 mL / OUT: 950 mL / NET: -470 mL        PHYSICAL EXAM:  HEAD:  Atraumatic, Normocephalic  NECK: Supple, No   JVD  CHEST/LUNG:   no     rales,     no,    rhonchi  HEART: Regular rate and rhythm;         murmur  ABDOMEN: Soft, Nontender, ;   EXTREMITIES:        edema  NEUROLOGY:  alert    LABS:                        13.5   5.8   )-----------( 293      ( 2018 00:16 )             40.7     06-01    141  |  99  |  16  ----------------------------<  95  5.3   |  30  |  1.04    Ca    9.8      2018 17:40    TPro  8.7<H>  /  Alb  4.3  /  TBili  0.3  /  DBili  x   /  AST  43<H>  /  ALT  22  /  AlkPhos  64  06-    PT/INR - ( 2018 17:40 )   PT: 13.7 sec;   INR: 1.26 ratio         PTT - ( 2018 17:40 )  PTT:37.3 sec      Urinalysis Basic - ( 2018 19:01 )    Color: Red / Appearance: SL Turbid / S.014 / pH: x  Gluc: x / Ketone: Negative  / Bili: Negative / Urobili: Negative   Blood: x / Protein: 100 mg/dL / Nitrite: Negative   Leuk Esterase: Small / RBC: >50 /HPF / WBC 6-10 /HPF   Sq Epi: x / Non Sq Epi: x / Bacteria: x           @ 17:40  3.4  22      Thyroid Stimulating Hormone, Serum: 4.14 uIU/mL ( @ 08:14)          Consultant(s) Notes Reviewed:      Care Discussed with Consultants/Other Providers:

## 2018-06-02 NOTE — PROGRESS NOTE ADULT - ASSESSMENT
pt  with htn, tia,  afib, ppm, cardiomyopathy      ca  bladder  s/p  turbt  last  month,  now   with gross hematuria  urology  f/p   may  need  bladder  irrigation   hold  a/c     urology/  dr scott  on amio/ lasix/ asa/ keppra/ beta  blocker  small  cell carcinoma bladder, awaiting ct chest

## 2018-06-02 NOTE — PROVIDER CONTACT NOTE (OTHER) - SITUATION
As per telemetry the pt is inappropriately pacing, noting pacing spikes on the T-waves and early demand pacing.

## 2018-06-02 NOTE — PROGRESS NOTE ADULT - SUBJECTIVE AND OBJECTIVE BOX
- Patient seen and examined.  - In summary, patient is a 89 year old man who presented with hematuria. (2018 17:36)  - Today, patient is without complaints.         *****MEDICATIONS:    MEDICATIONS  (STANDING):  digoxin     Tablet 0.125 milliGRAM(s) Oral daily  furosemide    Tablet 20 milliGRAM(s) Oral daily  levETIRAcetam 500 milliGRAM(s) Oral two times a day  metoprolol succinate  milliGRAM(s) Oral daily  simvastatin 20 milliGRAM(s) Oral at bedtime  tamsulosin Oral Tab/Cap - Peds 0.4 milliGRAM(s) Oral at bedtime    MEDICATIONS  (PRN):           ***** REVIEW OF SYSTEM:  GEN: no fever, no chills, no pain  RESP: no SOB, no cough, no sputum  CVS: no chest pain, no palpitations, no edema  GI: no abdominal pain, no nausea, no vomiting, no constipation, no diarrhea  : no dysuria, no frequency  NEURO: no headache, no dizziness  PSYCH: no depression, not anxious  Derm : no itching, no rash         ***** VITAL SIGNS:  T(F): 97.8 (18 @ 04:06), Max: 99.1 (18 @ 14:58)  HR: 86 (18 @ 04:06) (86 - 104)  BP: 158/98 (18 @ 04:06) (139/85 - 177/82)  RR: 18 (18 @ 04:06) (16 - 20)  SpO2: 98% (18 @ 04:06) (97% - 100%)  Wt(kg): --  ,   I&O's Summary    2018 07:01  -  2018 07:00  --------------------------------------------------------  IN: 480 mL / OUT: 950 mL / NET: -470 mL             *****PHYSICAL EXAM:  GEN: A&O X 3 , NAD , comfortable  HEENT: NCAT, EOMI, MMM, no icterus  NECK: Supple, No JVD  CVS: S1S2 , regular , No M/R/G appreciated  PULM: CTA B/L,  no W/R/R appreciated  ABD.: soft. non tender, non distended,  bowel sounds present  Extrem: intact pulses , no edema noted  Derm: No rash or ecchymosis noted  PSYCH: normal mood, no depression, not anxious         *****LAB AND IMAGIN.5   5.8   )-----------( 293      ( 2018 00:16 )             40.7               06-01    141  |  99  |  16  ----------------------------<  95  5.3   |  30  |  1.04    Ca    9.8      2018 17:40    TPro  8.7<H>  /  Alb  4.3  /  TBili  0.3  /  DBili  x   /  AST  43<H>  /  ALT  22  /  AlkPhos  64  06-01    PT/INR - ( 2018 17:40 )   PT: 13.7 sec;   INR: 1.26 ratio         PTT - ( 2018 17:40 )  PTT:37.3 sec                   Urinalysis Basic - ( 2018 19:01 )    Color: Red / Appearance: SL Turbid / S.014 / pH: x  Gluc: x / Ketone: Negative  / Bili: Negative / Urobili: Negative   Blood: x / Protein: 100 mg/dL / Nitrite: Negative   Leuk Esterase: Small / RBC: >50 /HPF / WBC 6-10 /HPF   Sq Epi: x / Non Sq Epi: x / Bacteria: x      [All pertinent recent Imaging/Reports reviewed]         *****A S S E S S M E N T   A N D   P L A N :  89M with hematuria s/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

## 2018-06-03 LAB
ANION GAP SERPL CALC-SCNC: 8 MMOL/L — SIGNIFICANT CHANGE UP (ref 5–17)
BUN SERPL-MCNC: 20 MG/DL — SIGNIFICANT CHANGE UP (ref 7–23)
CALCIUM SERPL-MCNC: 9.1 MG/DL — SIGNIFICANT CHANGE UP (ref 8.4–10.5)
CHLORIDE SERPL-SCNC: 99 MMOL/L — SIGNIFICANT CHANGE UP (ref 96–108)
CO2 SERPL-SCNC: 33 MMOL/L — HIGH (ref 22–31)
CREAT SERPL-MCNC: 1.11 MG/DL — SIGNIFICANT CHANGE UP (ref 0.5–1.3)
GLUCOSE SERPL-MCNC: 93 MG/DL — SIGNIFICANT CHANGE UP (ref 70–99)
HCT VFR BLD CALC: 39.3 % — SIGNIFICANT CHANGE UP (ref 39–50)
HGB BLD-MCNC: 13.1 G/DL — SIGNIFICANT CHANGE UP (ref 13–17)
MCHC RBC-ENTMCNC: 29.2 PG — SIGNIFICANT CHANGE UP (ref 27–34)
MCHC RBC-ENTMCNC: 33.3 GM/DL — SIGNIFICANT CHANGE UP (ref 32–36)
MCV RBC AUTO: 87.7 FL — SIGNIFICANT CHANGE UP (ref 80–100)
PLATELET # BLD AUTO: 326 K/UL — SIGNIFICANT CHANGE UP (ref 150–400)
POTASSIUM SERPL-MCNC: 3.1 MMOL/L — LOW (ref 3.5–5.3)
POTASSIUM SERPL-SCNC: 3.1 MMOL/L — LOW (ref 3.5–5.3)
RBC # BLD: 4.48 M/UL — SIGNIFICANT CHANGE UP (ref 4.2–5.8)
RBC # FLD: 14.2 % — SIGNIFICANT CHANGE UP (ref 10.3–14.5)
SODIUM SERPL-SCNC: 140 MMOL/L — SIGNIFICANT CHANGE UP (ref 135–145)
WBC # BLD: 5.32 K/UL — SIGNIFICANT CHANGE UP (ref 3.8–10.5)
WBC # FLD AUTO: 5.32 K/UL — SIGNIFICANT CHANGE UP (ref 3.8–10.5)

## 2018-06-03 RX ORDER — DOCUSATE SODIUM 100 MG
100 CAPSULE ORAL THREE TIMES A DAY
Qty: 0 | Refills: 0 | Status: DISCONTINUED | OUTPATIENT
Start: 2018-06-03 | End: 2018-06-08

## 2018-06-03 RX ORDER — POTASSIUM CHLORIDE 20 MEQ
40 PACKET (EA) ORAL EVERY 4 HOURS
Qty: 0 | Refills: 0 | Status: COMPLETED | OUTPATIENT
Start: 2018-06-03 | End: 2018-06-03

## 2018-06-03 RX ORDER — SENNA PLUS 8.6 MG/1
2 TABLET ORAL AT BEDTIME
Qty: 0 | Refills: 0 | Status: DISCONTINUED | OUTPATIENT
Start: 2018-06-03 | End: 2018-06-08

## 2018-06-03 RX ADMIN — LEVETIRACETAM 500 MILLIGRAM(S): 250 TABLET, FILM COATED ORAL at 06:39

## 2018-06-03 RX ADMIN — Medication 20 MILLIGRAM(S): at 06:39

## 2018-06-03 RX ADMIN — Medication 40 MILLIEQUIVALENT(S): at 21:40

## 2018-06-03 RX ADMIN — Medication 100 MILLIGRAM(S): at 06:39

## 2018-06-03 RX ADMIN — LEVETIRACETAM 500 MILLIGRAM(S): 250 TABLET, FILM COATED ORAL at 17:45

## 2018-06-03 RX ADMIN — TAMSULOSIN HYDROCHLORIDE 0.4 MILLIGRAM(S): 0.4 CAPSULE ORAL at 21:40

## 2018-06-03 RX ADMIN — SENNA PLUS 2 TABLET(S): 8.6 TABLET ORAL at 21:40

## 2018-06-03 RX ADMIN — Medication 100 MILLIGRAM(S): at 17:45

## 2018-06-03 RX ADMIN — Medication 0.12 MILLIGRAM(S): at 06:39

## 2018-06-03 RX ADMIN — Medication 100 MILLIGRAM(S): at 21:40

## 2018-06-03 RX ADMIN — Medication 40 MILLIEQUIVALENT(S): at 17:45

## 2018-06-03 RX ADMIN — SIMVASTATIN 20 MILLIGRAM(S): 20 TABLET, FILM COATED ORAL at 21:40

## 2018-06-03 NOTE — PROGRESS NOTE ADULT - ASSESSMENT
pt  with htn, tia,  afib, ppm, cardiomyopathy      ca  bladder  s/p  turbt  last  month,  now   with gross hematuria  urology  f/p   hold  a/c  urology/  dr scott  on amio/ lasix/ asa/ keppra/ beta  blocker  small  cell carcinoma bladder, unusual pathology   awaiting ct chest  report

## 2018-06-03 NOTE — PROGRESS NOTE ADULT - SUBJECTIVE AND OBJECTIVE BOX
still with gross  hematuria, per  wife  REVIEW OF SYSTEMS:  CONSTITUTIONAL: No weakness,  no   fevers      RESPIRATORY:  No    shortness of breath  , no  wheezing  CARDIOVASCULAR: No chest pain,   no  palpitations, no  cough  GASTROINTESTINAL: No abdominal  pain, no  vomiting, no  diarrhea  NEUROLOGICAL: No  focal  weakness    MEDICATIONS  (STANDING):  digoxin     Tablet 0.125 milliGRAM(s) Oral daily  furosemide    Tablet 20 milliGRAM(s) Oral daily  levETIRAcetam 500 milliGRAM(s) Oral two times a day  metoprolol succinate  milliGRAM(s) Oral daily  mirtazapine 15 milliGRAM(s) Oral at bedtime  simvastatin 20 milliGRAM(s) Oral at bedtime  tamsulosin Oral Tab/Cap - Peds 0.4 milliGRAM(s) Oral at bedtime    MEDICATIONS  (PRN):      Vital Signs Last 24 Hrs  T(C): 36.7 (2018 04:26), Max: 37 (2018 21:22)  T(F): 98 (2018 04:26), Max: 98.6 (2018 21:22)  HR: 93 (2018 04:26) (90 - 93)  BP: 108/66 (2018 04:26) (108/66 - 137/56)  BP(mean): --  RR: 18 (2018 04:26) (16 - 18)  SpO2: 96% (2018 04:26) (96% - 97%)  CAPILLARY BLOOD GLUCOSE        I&O's Summary    2018 07:01  -  2018 07:00  --------------------------------------------------------  IN: 960 mL / OUT: 2150 mL / NET: -1190 mL        PHYSICAL EXAM:  HEAD:  Atraumatic, Normocephalic  NECK: Supple, No   JVD  CHEST/LUNG:   no     rales,     no,    rhonchi  HEART: Regular rate and rhythm;         murmur  ABDOMEN: Soft, Nontender, ;   EXTREMITIES:        edema  NEUROLOGY:  alert    LABS:                        13.1   5.32  )-----------( 326      ( 2018 08:41 )             39.3     06-03    140  |  99  |  20  ----------------------------<  93  3.1<L>   |  33<H>  |  1.11    Ca    9.1      2018 07:07    TPro  8.7<H>  /  Alb  4.3  /  TBili  0.3  /  DBili  x   /  AST  43<H>  /  ALT  22  /  AlkPhos  64  06    PT/INR - ( 2018 17:40 )   PT: 13.7 sec;   INR: 1.26 ratio         PTT - ( 2018 17:40 )  PTT:37.3 sec      Urinalysis Basic - ( 2018 19:01 )    Color: Red / Appearance: SL Turbid / S.014 / pH: x  Gluc: x / Ketone: Negative  / Bili: Negative / Urobili: Negative   Blood: x / Protein: 100 mg/dL / Nitrite: Negative   Leuk Esterase: Small / RBC: >50 /HPF / WBC 6-10 /HPF   Sq Epi: x / Non Sq Epi: x / Bacteria: x           @ 17:40  3.4  22      Thyroid Stimulating Hormone, Serum: 4.14 uIU/mL ( @ 08:14)          Consultant(s) Notes Reviewed:      Care Discussed with Consultants/Other Providers:

## 2018-06-03 NOTE — PROGRESS NOTE ADULT - SUBJECTIVE AND OBJECTIVE BOX
- Patient seen and examined.  - In summary, patient is a 89 year old man who presented with hematuria. (2018 17:36)  - Today, patient is without complaints.         *****MEDICATIONS:    MEDICATIONS  (STANDING):  digoxin     Tablet 0.125 milliGRAM(s) Oral daily  furosemide    Tablet 20 milliGRAM(s) Oral daily  levETIRAcetam 500 milliGRAM(s) Oral two times a day  metoprolol succinate  milliGRAM(s) Oral daily  mirtazapine 15 milliGRAM(s) Oral at bedtime  simvastatin 20 milliGRAM(s) Oral at bedtime  tamsulosin Oral Tab/Cap - Peds 0.4 milliGRAM(s) Oral at bedtime    MEDICATIONS  (PRN):             ***** REVIEW OF SYSTEM:  GEN: no fever, no chills, no pain  RESP: no SOB, no cough, no sputum  CVS: no chest pain, no palpitations, no edema  GI: no abdominal pain, no nausea, no vomiting, no constipation, no diarrhea  : no dysuria, no frequency  NEURO: no headache, no dizziness  PSYCH: no depression, not anxious  Derm : no itching, no rash         ***** VITAL SIGNS:    T(F): 98 (18 @ 04:26), Max: 98.6 (18 @ 21:22)  HR: 93 (18 @ 04:26) (90 - 93)  BP: 108/66 (18 @ 04:26) (108/66 - 137/56)  RR: 18 (18 @ 04:26) (16 - 18)  SpO2: 96% (18 @ 04:26) (96% - 97%)  Wt(kg): --  ,   I&O's Summary    2018 07:01  -  2018 07:00  --------------------------------------------------------  IN: 960 mL / OUT: 2150 mL / NET: -1190 mL                   *****PHYSICAL EXAM:  GEN: A&O X 3 , NAD , comfortable  HEENT: NCAT, EOMI, MMM, no icterus  NECK: Supple, No JVD  CVS: S1S2 , regular , No M/R/G appreciated  PULM: CTA B/L,  no W/R/R appreciated  ABD.: soft. non tender, non distended,  bowel sounds present  Extrem: intact pulses , no edema noted  Derm: No rash or ecchymosis noted  PSYCH: normal mood, no depression, not anxious         *****LAB AND IMAGIN.1   5.32  )-----------( 326      ( 2018 08:41 )             39.3               06-03    140  |  99  |  20  ----------------------------<  93  3.1<L>   |  33<H>  |  1.11    Ca    9.1      2018 07:07    TPro  8.7<H>  /  Alb  4.3  /  TBili  0.3  /  DBili  x   /  AST  43<H>  /  ALT  22  /  AlkPhos  64  06-01    PT/INR - ( 2018 17:40 )   PT: 13.7 sec;   INR: 1.26 ratio         PTT - ( 2018 17:40 )  PTT:37.3 sec                   Urinalysis Basic - ( 2018 19:01 )    Color: Red / Appearance: SL Turbid / S.014 / pH: x  Gluc: x / Ketone: Negative  / Bili: Negative / Urobili: Negative   Blood: x / Protein: 100 mg/dL / Nitrite: Negative   Leuk Esterase: Small / RBC: >50 /HPF / WBC 6-10 /HPF   Sq Epi: x / Non Sq Epi: x / Bacteria: x      [All pertinent recent Imaging/Reports reviewed]         *****A S S E S S M E N T   A N D   P L A N :  89M with hematuria s/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

## 2018-06-03 NOTE — PROGRESS NOTE ADULT - SUBJECTIVE AND OBJECTIVE BOX
The patient is feeling well, voiding without difficulty, no pain.    Vital Signs Last 24 Hrs  T(C): 36.7 (2018 04:26), Max: 37 (2018 21:22)  T(F): 98 (2018 04:26), Max: 98.6 (2018 21:22)  HR: 93 (2018 04:26) (90 - 93)  BP: 108/66 (2018 04:26) (108/66 - 137/56)  BP(mean): --  RR: 18 (2018 04:26) (16 - 18)  SpO2: 96% (2018 04:26) (96% - 97%)    PHYSICAL EXAM:    NAD, well-developed  Abd: soft, NT/ND, No CVAT    Urine: minimally blood tinged (light tea colored)    I&O's Summary    2018 07:01  -  2018 07:00  --------------------------------------------------------  IN: 960 mL / OUT: 2150 mL / NET: -1190 mL        LABS:                        13.1   5.32  )-----------( 326      ( 2018 08:41 )             39.3     06-03    140  |  99  |  20  ----------------------------<  93  3.1<L>   |  33<H>  |  1.11    Ca    9.1      2018 07:07    TPro  8.7<H>  /  Alb  4.3  /  TBili  0.3  /  DBili  x   /  AST  43<H>  /  ALT  22  /  AlkPhos  64  06-01    Urinalysis Basic - ( 2018 19:01 )    Color: Red / Appearance: SL Turbid / S.014 / pH: x  Gluc: x / Ketone: Negative  / Bili: Negative / Urobili: Negative   Blood: x / Protein: 100 mg/dL / Nitrite: Negative   Leuk Esterase: Small / RBC: >50 /HPF / WBC 6-10 /HPF   Sq Epi: x / Non Sq Epi: x / Bacteria: x      Prior notes/chart reviewed.

## 2018-06-03 NOTE — PROGRESS NOTE ADULT - PROBLEM SELECTOR PLAN 1
No immediate  intervention needed, as hematuria has nearly resolved.  Continue hydration.  Would ideally hold eliquis if ok with cardiology (allowing hematuria to further resolve).  Outpatient follow-up in short interval with Dr Ibarra.

## 2018-06-04 ENCOUNTER — OUTPATIENT (OUTPATIENT)
Dept: OUTPATIENT SERVICES | Facility: HOSPITAL | Age: 83
LOS: 1 days | Discharge: HOME | End: 2018-06-04

## 2018-06-04 DIAGNOSIS — C67.9 MALIGNANT NEOPLASM OF BLADDER, UNSPECIFIED: ICD-10-CM

## 2018-06-04 DIAGNOSIS — Z85.46 PERSONAL HISTORY OF MALIGNANT NEOPLASM OF PROSTATE: Chronic | ICD-10-CM

## 2018-06-04 LAB
ANION GAP SERPL CALC-SCNC: 13 MMOL/L — SIGNIFICANT CHANGE UP (ref 5–17)
BUN SERPL-MCNC: 20 MG/DL — SIGNIFICANT CHANGE UP (ref 7–23)
CALCIUM SERPL-MCNC: 9.1 MG/DL — SIGNIFICANT CHANGE UP (ref 8.4–10.5)
CHLORIDE SERPL-SCNC: 100 MMOL/L — SIGNIFICANT CHANGE UP (ref 96–108)
CO2 SERPL-SCNC: 26 MMOL/L — SIGNIFICANT CHANGE UP (ref 22–31)
CREAT SERPL-MCNC: 0.96 MG/DL — SIGNIFICANT CHANGE UP (ref 0.5–1.3)
GLUCOSE SERPL-MCNC: 151 MG/DL — HIGH (ref 70–99)
HCT VFR BLD CALC: 39.7 % — SIGNIFICANT CHANGE UP (ref 39–50)
HGB BLD-MCNC: 13.2 G/DL — SIGNIFICANT CHANGE UP (ref 13–17)
MAGNESIUM SERPL-MCNC: 1.8 MG/DL — SIGNIFICANT CHANGE UP (ref 1.6–2.6)
MCHC RBC-ENTMCNC: 30.3 PG — SIGNIFICANT CHANGE UP (ref 27–34)
MCHC RBC-ENTMCNC: 33.2 GM/DL — SIGNIFICANT CHANGE UP (ref 32–36)
MCV RBC AUTO: 91.2 FL — SIGNIFICANT CHANGE UP (ref 80–100)
PLATELET # BLD AUTO: 290 K/UL — SIGNIFICANT CHANGE UP (ref 150–400)
POTASSIUM SERPL-MCNC: 3.4 MMOL/L — LOW (ref 3.5–5.3)
POTASSIUM SERPL-SCNC: 3.4 MMOL/L — LOW (ref 3.5–5.3)
RBC # BLD: 4.35 M/UL — SIGNIFICANT CHANGE UP (ref 4.2–5.8)
RBC # FLD: 13 % — SIGNIFICANT CHANGE UP (ref 10.3–14.5)
SODIUM SERPL-SCNC: 139 MMOL/L — SIGNIFICANT CHANGE UP (ref 135–145)
WBC # BLD: 5.6 K/UL — SIGNIFICANT CHANGE UP (ref 3.8–10.5)
WBC # FLD AUTO: 5.6 K/UL — SIGNIFICANT CHANGE UP (ref 3.8–10.5)

## 2018-06-04 RX ORDER — POTASSIUM CHLORIDE 20 MEQ
40 PACKET (EA) ORAL EVERY 4 HOURS
Qty: 0 | Refills: 0 | Status: DISCONTINUED | OUTPATIENT
Start: 2018-06-04 | End: 2018-06-04

## 2018-06-04 RX ADMIN — MIRTAZAPINE 15 MILLIGRAM(S): 45 TABLET, ORALLY DISINTEGRATING ORAL at 22:20

## 2018-06-04 RX ADMIN — Medication 100 MILLIGRAM(S): at 05:26

## 2018-06-04 RX ADMIN — SENNA PLUS 2 TABLET(S): 8.6 TABLET ORAL at 22:20

## 2018-06-04 RX ADMIN — Medication 40 MILLIEQUIVALENT(S): at 09:46

## 2018-06-04 RX ADMIN — TAMSULOSIN HYDROCHLORIDE 0.4 MILLIGRAM(S): 0.4 CAPSULE ORAL at 22:20

## 2018-06-04 RX ADMIN — Medication 100 MILLIGRAM(S): at 14:18

## 2018-06-04 RX ADMIN — SIMVASTATIN 20 MILLIGRAM(S): 20 TABLET, FILM COATED ORAL at 22:19

## 2018-06-04 RX ADMIN — Medication 0.12 MILLIGRAM(S): at 05:26

## 2018-06-04 RX ADMIN — Medication 20 MILLIGRAM(S): at 05:26

## 2018-06-04 RX ADMIN — LEVETIRACETAM 500 MILLIGRAM(S): 250 TABLET, FILM COATED ORAL at 18:14

## 2018-06-04 RX ADMIN — LEVETIRACETAM 500 MILLIGRAM(S): 250 TABLET, FILM COATED ORAL at 05:26

## 2018-06-04 RX ADMIN — Medication 100 MILLIGRAM(S): at 22:20

## 2018-06-04 NOTE — PROGRESS NOTE ADULT - ASSESSMENT
pt  with htn, tia,  afib, ppm, cardiomyopathy      ca  bladder  s/p  turbt  last  month,  now   with gross hematuria  urology  f/p   hold  a/c  urology/  dr scott  on amio/ lasix/ asa/ keppra/ beta  blocker  small  cell carcinoma bladder, unusual pathology   awaiting ct chest  report  still  pending

## 2018-06-04 NOTE — CONSULT NOTE ADULT - SUBJECTIVE AND OBJECTIVE BOX
HPI:  CHIEF COMPLAINT:Patient is a 89y old  Male who presents with a chief complaint of severe hematuria since yesterday.    HPI:pt is a 88 yo male with hx of htn, a.fib ,sss, s/p ppm, s/p turbt, who presented to er with hematuria after taking a dose of eliquis 2.5 mg.  no chest pain,+ palpitation in a.fib with rvr.      PAST MEDICAL & SURGICAL HISTORY:  Pacemaker  HF (heart failure)  Seizure  Prostate cancer  BPH (benign prostatic hypertrophy)  PAF (paroxysmal atrial fibrillation)  TIA (Transient Ischemic Attack)  Prostate Ca: s/p seed implant  Hyperlipemia  HTN (Hypertension)  H/O prostate cancer: seed implant  No Past Surgical History      MEDICATIONS  (STANDING):    MEDICATIONS  (PRN):      FAMILY HISTORY:  No pertinent family history in first degree relatives      SOCIAL HISTORY:    [ ] Non-smoker  [ ] Smoker  [ ] Alcohol    Allergies    ACE inhibitors (Other)    Intolerances    	    REVIEW OF SYSTEMS:  CONSTITUTIONAL: No fever, weight loss, or fatigue  EYES: No eye pain, visual disturbances, or discharge  ENT:  No difficulty hearing, tinnitus, vertigo; No sinus or throat pain  NECK: No pain or stiffness  RESPIRATORY: No cough, wheezing, chills or hemoptysis; No Shortness of Breath  CARDIOVASCULAR: No chest pain, palpitations, passing out, dizziness, or leg swelling  GASTROINTESTINAL: No abdominal or epigastric pain. No nausea, vomiting, or hematemesis; No diarrhea or constipation. No melena or hematochezia.  GENITOURINARY: No dysuria, frequency, +  hematuria,   NEUROLOGICAL: No headaches, memory loss, loss of strength, numbness, or tremors  SKIN: No itching, burning, rashes, or lesions   LYMPH Nodes: No enlarged glands  ENDOCRINE: No heat or cold intolerance; No hair loss  MUSCULOSKELETAL: No joint pain or swelling; No muscle, back, or extremity pain  PSYCHIATRIC: No depression, anxiety, mood swings, or difficulty sleeping  HEME/LYMPH: No easy bruising, or bleeding gums  ALLERGY AND IMMUNOLOGIC: No hives or eczema	    [ ] All others negative	  [ ] Unable to obtain    PHYSICAL EXAM:  T(C): 36.9 (06-01-18 @ 16:19), Max: 37.3 (06-01-18 @ 14:58)  HR: 103 (06-01-18 @ 16:19) (103 - 103)  BP: 154/94 (06-01-18 @ 16:19) (139/85 - 154/94)  RR: 20 (06-01-18 @ 16:19) (18 - 20)  SpO2: 100% (06-01-18 @ 16:19) (98% - 100%)  Wt(kg): --  I&O's Summary      Appearance: Normal	  HEENT:   Normal oral mucosa, PERRL, EOMI	  Lymphatic: No lymphadenopathy  Cardiovascular: Normal S1 S2, No JVD, + murmurs, No edema  Respiratory: Lungs clear to auscultation	  Psychiatry: A & O x 3, Mood & affect appropriate  Gastrointestinal:  Soft, Non-tender, + BS	  Skin: No rashes, No ecchymoses, No cyanosis	  Neurologic: Non-focal  Extremities: Normal range of motion, No clubbing, cyanosis or edema  Vascular: Peripheral pulses palpable 2+ bilaterally    TELEMETRY: 	    ECG:  	  RADIOLOGY:  OTHER: 	  	  LABS:	 	    CARDIAC MARKERS:                proBNP:   Lipid Profile:   HgA1c:   TSH:       PREVIOUS DIAGNOSTIC TESTING:    [ ] Echocardiogram:  [ ]  Catheterization:  [ ] Stress Test: (01 Jun 2018 17:36)    PAST MEDICAL & SURGICAL HISTORY:  Pacemaker  HF (heart failure)  Seizure  Prostate cancer  BPH (benign prostatic hypertrophy)  PAF (paroxysmal atrial fibrillation)  TIA (Transient Ischemic Attack)  Prostate Ca: s/p seed implant  Hyperlipemia  HTN (Hypertension)  H/O prostate cancer: seed implant  No Past Surgical History    Meds:  digoxin     Tablet 0.125 milliGRAM(s) Oral daily  docusate sodium 100 milliGRAM(s) Oral three times a day  furosemide    Tablet 20 milliGRAM(s) Oral daily  levETIRAcetam 500 milliGRAM(s) Oral two times a day  metoprolol succinate  milliGRAM(s) Oral daily  mirtazapine 15 milliGRAM(s) Oral at bedtime  potassium chloride    Tablet ER 40 milliEquivalent(s) Oral every 4 hours  senna 2 Tablet(s) Oral at bedtime  simvastatin 20 milliGRAM(s) Oral at bedtime  tamsulosin Oral Tab/Cap - Peds 0.4 milliGRAM(s) Oral at bedtime    Labs:  CBC Full  -  ( 04 Jun 2018 10:15 )  WBC Count : 5.6 K/uL  Hemoglobin : 13.2 g/dL  Hematocrit : 39.7 %  Platelet Count - Automated : 290 K/uL  Mean Cell Volume : 91.2 fl  Mean Cell Hemoglobin : 30.3 pg  Mean Cell Hemoglobin Concentration : 33.2 gm/dL  Auto Neutrophil # : x  Auto Lymphocyte # : x  Auto Monocyte # : x  Auto Eosinophil # : x  Auto Basophil # : x  Auto Neutrophil % : x  Auto Lymphocyte % : x  Auto Monocyte % : x  Auto Eosinophil % : x  Auto Basophil % : x    06-04    139  |  100  |  20  ----------------------------<  151<H>  3.4<L>   |  26  |  0.96    Ca    9.1      04 Jun 2018 10:13  Mg     1.8     06-04        Radiology:             ROS:  No pain, no fever  No lumps in neck or pain  No Sob or chest pain  No palpitations   No abdominal pain. No change in bowel habit. No blood in stools  No weakness in extremities  No leg swelling      Vital Signs Last 24 Hrs  T(C): 36.6 (04 Jun 2018 04:29), Max: 36.6 (03 Jun 2018 11:37)  T(F): 97.9 (04 Jun 2018 04:29), Max: 97.9 (03 Jun 2018 11:37)  HR: 90 (04 Jun 2018 11:08) (80 - 100)  BP: 96/50 (04 Jun 2018 11:08) (96/50 - 144/74)  BP(mean): --  RR: 18 (04 Jun 2018 11:08) (17 - 18)  SpO2: 99% (04 Jun 2018 11:08) (96% - 99%)    Physical Exam:  Patient comfortable  AXOX3  Neck supple, no LN's  Chest: bilateral breath sounds, no wheeze or rales  CVS: regular heart rate without murmur  Abdomen: soft, BS+, no massess or organomegaly  CNS: no gross deficit  Ext: no edema HPI:  Patient is a 89y old  Male who was admitted with a chief complaint of severe hematuria on 6/1/18.  He has hx of htn, a.fib on Eliquis ,SSS, s/p ppm, s/p TURBT      PAST MEDICAL & SURGICAL HISTORY:  Pacemaker  HF (heart failure)  Seizure  Prostate cancer  BPH (benign prostatic hypertrophy)  PAF (paroxysmal atrial fibrillation)  TIA (Transient Ischemic Attack)  Prostate Ca: s/p seed implant  Hyperlipemia  HTN (Hypertension)  H/O prostate cancer: seed implant  No Past Surgical History            Meds:  digoxin     Tablet 0.125 milliGRAM(s) Oral daily  docusate sodium 100 milliGRAM(s) Oral three times a day  furosemide    Tablet 20 milliGRAM(s) Oral daily  levETIRAcetam 500 milliGRAM(s) Oral two times a day  metoprolol succinate  milliGRAM(s) Oral daily  mirtazapine 15 milliGRAM(s) Oral at bedtime  potassium chloride    Tablet ER 40 milliEquivalent(s) Oral every 4 hours  senna 2 Tablet(s) Oral at bedtime  simvastatin 20 milliGRAM(s) Oral at bedtime  tamsulosin Oral Tab/Cap - Peds 0.4 milliGRAM(s) Oral at bedtime    Labs:  CBC Full  -  ( 04 Jun 2018 10:15 )  WBC Count : 5.6 K/uL  Hemoglobin : 13.2 g/dL  Hematocrit : 39.7 %  Platelet Count - Automated : 290 K/uL  Mean Cell Volume : 91.2 fl  Mean Cell Hemoglobin : 30.3 pg  Mean Cell Hemoglobin Concentration : 33.2 gm/dL  Auto Neutrophil # : x  Auto Lymphocyte # : x  Auto Monocyte # : x  Auto Eosinophil # : x  Auto Basophil # : x  Auto Neutrophil % : x  Auto Lymphocyte % : x  Auto Monocyte % : x  Auto Eosinophil % : x  Auto Basophil % : x    06-04    139  |  100  |  20  ----------------------------<  151<H>  3.4<L>   |  26  |  0.96    Ca    9.1      04 Jun 2018 10:13  Mg     1.8     06-04        Radiology:             ROS:  No pain, no fever  No lumps in neck or pain  No Sob or chest pain  No palpitations   No abdominal pain. No change in bowel habit. No blood in stools  No weakness in extremities  No leg swelling  Hematuria improving      Vital Signs Last 24 Hrs  T(C): 36.6 (04 Jun 2018 04:29), Max: 36.6 (03 Jun 2018 11:37)  T(F): 97.9 (04 Jun 2018 04:29), Max: 97.9 (03 Jun 2018 11:37)  HR: 90 (04 Jun 2018 11:08) (80 - 100)  BP: 96/50 (04 Jun 2018 11:08) (96/50 - 144/74)  BP(mean): --  RR: 18 (04 Jun 2018 11:08) (17 - 18)  SpO2: 99% (04 Jun 2018 11:08) (96% - 99%)    Physical Exam:  Patient comfortable  AXOX3  Neck supple, no LN's  Chest: bilateral breath sounds, no wheeze or rales  CVS: regular heart rate without murmur  Abdomen: soft, BS+, no massess or organomegaly  CNS: no gross deficit  Ext: no edema

## 2018-06-04 NOTE — PROGRESS NOTE ADULT - SUBJECTIVE AND OBJECTIVE BOX
- Patient seen and examined.  - In summary, patient is a 89 year old man who presented with hematuria. (2018 17:36)  - Today, patient is without complaints.         *****MEDICATIONS:    MEDICATIONS  (STANDING):  digoxin     Tablet 0.125 milliGRAM(s) Oral daily  docusate sodium 100 milliGRAM(s) Oral three times a day  furosemide    Tablet 20 milliGRAM(s) Oral daily  levETIRAcetam 500 milliGRAM(s) Oral two times a day  metoprolol succinate  milliGRAM(s) Oral daily  mirtazapine 15 milliGRAM(s) Oral at bedtime  potassium chloride    Tablet ER 40 milliEquivalent(s) Oral every 4 hours  senna 2 Tablet(s) Oral at bedtime  simvastatin 20 milliGRAM(s) Oral at bedtime  tamsulosin Oral Tab/Cap - Peds 0.4 milliGRAM(s) Oral at bedtime    MEDICATIONS  (PRN):               ***** REVIEW OF SYSTEM:  GEN: no fever, no chills, no pain  RESP: no SOB, no cough, no sputum  CVS: no chest pain, no palpitations, no edema  GI: no abdominal pain, no nausea, no vomiting, no constipation, no diarrhea  : no dysuria, no frequency  NEURO: no headache, no dizziness  PSYCH: no depression, not anxious  Derm : no itching, no rash         ***** VITAL SIGNS:    T(F): 97.9 (18 @ 04:29), Max: 97.9 (18 @ 11:37)  HR: 100 (18 @ 04:29) (80 - 100)  BP: 123/76 (18 @ 04:29) (112/72 - 144/74)  RR: 17 (18 @ 04:29) (17 - 18)  SpO2: 99% (18 @ 04:29) (96% - 99%)  Wt(kg): --  ,   I&O's Summary    2018 07:01  -  2018 07:00  --------------------------------------------------------  IN: 120 mL / OUT: 350 mL / NET: -230 mL             *****PHYSICAL EXAM:  GEN: A&O X 3 , NAD , comfortable  HEENT: NCAT, EOMI, MMM, no icterus  NECK: Supple, No JVD  CVS: S1S2 , regular , No M/R/G appreciated  PULM: CTA B/L,  no W/R/R appreciated  ABD.: soft. 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

## 2018-06-04 NOTE — PROGRESS NOTE ADULT - ASSESSMENT
d/c per medicine-no A/C if appropriate  has appt with Yuri Sheldon at [facility]    F/u my office 1-2 weeks

## 2018-06-04 NOTE — PROGRESS NOTE ADULT - SUBJECTIVE AND OBJECTIVE BOX
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  feels well    Vital Signs Last 24 Hrs  T(C): 36.6 (04 Jun 2018 11:20), Max: 36.6 (03 Jun 2018 20:52)  T(F): 97.9 (04 Jun 2018 11:20), Max: 97.9 (03 Jun 2018 20:52)  HR: 72 (04 Jun 2018 11:20) (72 - 100)  BP: 113/69 (04 Jun 2018 11:20) (96/50 - 123/76)  BP(mean): --  RR: 18 (04 Jun 2018 11:20) (17 - 18)  SpO2: 98% (04 Jun 2018 11:20) (98% - 99%)    PHYSICAL EXAM:    NAD, well-developed  Abd: soft, NT/ND, No CVAT    Urine: clear to tea colored    I&O's Summary    03 Jun 2018 07:01  -  04 Jun 2018 07:00  --------------------------------------------------------  IN: 120 mL / OUT: 350 mL / NET: -230 mL        LABS:                        13.2   5.6   )-----------( 290      ( 04 Jun 2018 10:15 )             39.7     06-04    139  |  100  |  20  ----------------------------<  151<H>  3.4<L>   |  26  |  0.96    Ca    9.1      04 Jun 2018 10:13  Mg     1.8     06-04        Urine Culture:     Prior notes/chart reviewed.

## 2018-06-04 NOTE — PROGRESS NOTE ADULT - SUBJECTIVE AND OBJECTIVE BOX
resolving hematuria  no  cp/sob  REVIEW OF SYSTEMS:  CONSTITUTIONAL: No weakness,  no   fevers      RESPIRATORY:  No    shortness of breath  , no  wheezing  CARDIOVASCULAR: No chest pain,   no  palpitations, no  cough  GASTROINTESTINAL: No abdominal  pain, no  vomiting, no  diarrhea  NEUROLOGICAL: No  focal  weakness    MEDICATIONS  (STANDING):  digoxin     Tablet 0.125 milliGRAM(s) Oral daily  docusate sodium 100 milliGRAM(s) Oral three times a day  furosemide    Tablet 20 milliGRAM(s) Oral daily  levETIRAcetam 500 milliGRAM(s) Oral two times a day  metoprolol succinate  milliGRAM(s) Oral daily  mirtazapine 15 milliGRAM(s) Oral at bedtime  senna 2 Tablet(s) Oral at bedtime  simvastatin 20 milliGRAM(s) Oral at bedtime  tamsulosin Oral Tab/Cap - Peds 0.4 milliGRAM(s) Oral at bedtime    MEDICATIONS  (PRN):      Vital Signs Last 24 Hrs  T(C): 36.6 (04 Jun 2018 04:29), Max: 36.6 (03 Jun 2018 11:37)  T(F): 97.9 (04 Jun 2018 04:29), Max: 97.9 (03 Jun 2018 11:37)  HR: 100 (04 Jun 2018 04:29) (80 - 100)  BP: 123/76 (04 Jun 2018 04:29) (112/72 - 144/74)  BP(mean): --  RR: 17 (04 Jun 2018 04:29) (17 - 18)  SpO2: 99% (04 Jun 2018 04:29) (96% - 99%)  CAPILLARY BLOOD GLUCOSE        I&O's Summary    03 Jun 2018 07:01  -  04 Jun 2018 07:00  --------------------------------------------------------  IN: 120 mL / OUT: 350 mL / NET: -230 mL        PHYSICAL EXAM:  HEAD:  Atraumatic, Normocephalic  NECK: Supple, No   JVD  CHEST/LUNG:   no     rales,     no,    rhonchi  HEART: Regular rate and rhythm;         murmur  ABDOMEN: Soft, Nontender, ;   EXTREMITIES:        edema  NEUROLOGY:  alert    LABS:                        13.1   5.32  )-----------( 326      ( 03 Jun 2018 08:41 )             39.3     06-03    140  |  99  |  20  ----------------------------<  93  3.1<L>   |  33<H>  |  1.11    Ca    9.1      03 Jun 2018 07:07                      Thyroid Stimulating Hormone, Serum: 4.14 uIU/mL (04-20 @ 08:14)          Consultant(s) Notes Reviewed:      Care Discussed with Consultants/Other Providers:

## 2018-06-04 NOTE — CONSULT NOTE ADULT - ASSESSMENT
Patient is 89 years old male who was admitted with hematuria. He has history of prostate cancer and had seed implants for them in past. He is status post TURBT on May 16, 201.  At present his hematuria is resolving.  Workup:   < from: CT Chest No Cont (06.01.18 @ 13:58) >  Centrilobular emphysema.    Small bilateral pleural effusions.    Small cluster opacities and right middle lobe measuring up to 6 mm are   nonspecific. Follow-up CT in 3 months is recommended to ensure   stability/resolution.        < from: CT Abdomen and Pelvis w/wo IV Cont (04.27.18 @ 15:17) >  A 2.7 cm enhancing bladder mass.     No evidence of upper tract disease.    No evidence of adenopathy/metastatic disease.      Surgical Pathology Report (05.16.18 @ 11:01)    Surgical Pathology Report:   ACCESSION No:  [id number]    JESSICA DOWD                      1        Surgical Final Report          Final Diagnosis  Bladder tumor, right lateral wall, TURBT  -Infiltrating small cell carcinoma.  See note.  -The carcinoma invades muscularis propria (detrusor muscle).    Note: Immunohistochemically, the carcinoma is positive for  synaptophysin and chromogranin. The immunoprofile supports the  above diagnosis.    _______________________________________________________    Clinical History  s/p brachytherapy for prostate cancer >  10 years ago    Specimen(s) Submitted  1     Bladder  tumor right lateral wall    As per radiology studies the patient did not have evidence of metastatic disease.  However small cell cancer of the bladder is an aggressive disease and is usually treated with new adjuvant chemotherapy like in small cell lung cancer followed by local regional treatment.  Our patient is 89 years old and we will have to take into consideration his comorbidities along with his age and preference into consideration before planning chemotherapy.  As per my conversation with the patient he did not seem aware of diagnosis.  Thus I did not discuss Dx/management with him today.   I will find out from urology and his family members regarding what has been discussed before speaking in detail to the patient.

## 2018-06-05 ENCOUNTER — TRANSCRIPTION ENCOUNTER (OUTPATIENT)
Age: 83
End: 2018-06-05

## 2018-06-05 LAB
ANION GAP SERPL CALC-SCNC: 11 MMOL/L — SIGNIFICANT CHANGE UP (ref 5–17)
BUN SERPL-MCNC: 26 MG/DL — HIGH (ref 7–23)
CALCIUM SERPL-MCNC: 9.4 MG/DL — SIGNIFICANT CHANGE UP (ref 8.4–10.5)
CHLORIDE SERPL-SCNC: 99 MMOL/L — SIGNIFICANT CHANGE UP (ref 96–108)
CO2 SERPL-SCNC: 28 MMOL/L — SIGNIFICANT CHANGE UP (ref 22–31)
CREAT SERPL-MCNC: 1.06 MG/DL — SIGNIFICANT CHANGE UP (ref 0.5–1.3)
GLUCOSE SERPL-MCNC: 106 MG/DL — HIGH (ref 70–99)
HCT VFR BLD CALC: 38 % — LOW (ref 39–50)
HGB BLD-MCNC: 12.6 G/DL — LOW (ref 13–17)
MCHC RBC-ENTMCNC: 29.5 PG — SIGNIFICANT CHANGE UP (ref 27–34)
MCHC RBC-ENTMCNC: 33.2 GM/DL — SIGNIFICANT CHANGE UP (ref 32–36)
MCV RBC AUTO: 89 FL — SIGNIFICANT CHANGE UP (ref 80–100)
PLATELET # BLD AUTO: 295 K/UL — SIGNIFICANT CHANGE UP (ref 150–400)
POTASSIUM SERPL-MCNC: 3.9 MMOL/L — SIGNIFICANT CHANGE UP (ref 3.5–5.3)
POTASSIUM SERPL-SCNC: 3.9 MMOL/L — SIGNIFICANT CHANGE UP (ref 3.5–5.3)
RBC # BLD: 4.27 M/UL — SIGNIFICANT CHANGE UP (ref 4.2–5.8)
RBC # FLD: 14.5 % — SIGNIFICANT CHANGE UP (ref 10.3–14.5)
SODIUM SERPL-SCNC: 138 MMOL/L — SIGNIFICANT CHANGE UP (ref 135–145)
WBC # BLD: 7.36 K/UL — SIGNIFICANT CHANGE UP (ref 3.8–10.5)
WBC # FLD AUTO: 7.36 K/UL — SIGNIFICANT CHANGE UP (ref 3.8–10.5)

## 2018-06-05 PROCEDURE — 99221 1ST HOSP IP/OBS SF/LOW 40: CPT

## 2018-06-05 RX ORDER — GABAPENTIN 400 MG/1
100 CAPSULE ORAL ONCE
Qty: 0 | Refills: 0 | Status: COMPLETED | OUTPATIENT
Start: 2018-06-05 | End: 2018-06-05

## 2018-06-05 RX ORDER — LIDOCAINE 4 G/100G
1 CREAM TOPICAL ONCE
Qty: 0 | Refills: 0 | Status: COMPLETED | OUTPATIENT
Start: 2018-06-05 | End: 2018-06-05

## 2018-06-05 RX ORDER — ACETAMINOPHEN 500 MG
650 TABLET ORAL ONCE
Qty: 0 | Refills: 0 | Status: COMPLETED | OUTPATIENT
Start: 2018-06-05 | End: 2018-06-05

## 2018-06-05 RX ADMIN — LEVETIRACETAM 500 MILLIGRAM(S): 250 TABLET, FILM COATED ORAL at 17:38

## 2018-06-05 RX ADMIN — Medication 100 MILLIGRAM(S): at 22:00

## 2018-06-05 RX ADMIN — Medication 100 MILLIGRAM(S): at 05:03

## 2018-06-05 RX ADMIN — Medication 20 MILLIGRAM(S): at 05:03

## 2018-06-05 RX ADMIN — Medication 100 MILLIGRAM(S): at 14:33

## 2018-06-05 RX ADMIN — GABAPENTIN 100 MILLIGRAM(S): 400 CAPSULE ORAL at 22:44

## 2018-06-05 RX ADMIN — Medication 650 MILLIGRAM(S): at 02:17

## 2018-06-05 RX ADMIN — LIDOCAINE 1 PATCH: 4 CREAM TOPICAL at 05:35

## 2018-06-05 RX ADMIN — Medication 650 MILLIGRAM(S): at 01:47

## 2018-06-05 RX ADMIN — Medication 0.12 MILLIGRAM(S): at 05:03

## 2018-06-05 RX ADMIN — SIMVASTATIN 20 MILLIGRAM(S): 20 TABLET, FILM COATED ORAL at 22:00

## 2018-06-05 RX ADMIN — LIDOCAINE 1 PATCH: 4 CREAM TOPICAL at 22:03

## 2018-06-05 RX ADMIN — LEVETIRACETAM 500 MILLIGRAM(S): 250 TABLET, FILM COATED ORAL at 05:03

## 2018-06-05 RX ADMIN — SENNA PLUS 2 TABLET(S): 8.6 TABLET ORAL at 21:59

## 2018-06-05 RX ADMIN — MIRTAZAPINE 15 MILLIGRAM(S): 45 TABLET, ORALLY DISINTEGRATING ORAL at 22:00

## 2018-06-05 RX ADMIN — TAMSULOSIN HYDROCHLORIDE 0.4 MILLIGRAM(S): 0.4 CAPSULE ORAL at 22:00

## 2018-06-05 NOTE — DISCHARGE NOTE ADULT - CARE PLAN
Principal Discharge DX:	Hematuria  Goal:	stable  Assessment and plan of treatment:	bleeding has resolved  continue off ac  Secondary Diagnosis:	Atrial fibrillation  Goal:	stable  Assessment and plan of treatment:	Atrial fibrillation is the most common heart rhythm problem.  The condition puts you at risk for has stroke and heart attack  It helps if you control your blood pressure, not drink more than 1-2 alcohol drinks per day, cut down on caffeine, getting treatment for over active thyroid gland, and get regular exercise  Call your doctor if you feel your heart racing or beating unusually, chest tightness or pain, lightheaded, faint, shortness of breath especially with exercise  It is important to take your heart medication as prescribed  You may be on anticoagulation which is very important to take as directed - you may need blood work to monitor drug levels  Secondary Diagnosis:	HTN (hypertension)  Goal:	stable  Assessment and plan of treatment:	Follow up with your medical doctor to establish long term blood pressure treatment goals.  Secondary Diagnosis:	HF (heart failure)  Goal:	stable  Assessment and plan of treatment:	Weigh yourself daily.  If you gain 3lbs in 3 days, or 5lbs in a week call your Health Care Provider.  Do not eat or drink foods containing more than 2000mg of salt (sodium) in your diet every day.  Call your Health Care Provider if you have any swelling or increased swelling in your feet, ankles, and/or stomach.  Take all of your medication as directed.  If you become dizzy call your Health Care Provider.

## 2018-06-05 NOTE — PROGRESS NOTE ADULT - ASSESSMENT
pt  with htn, tia,  afib, ppm, cardiomyopathy      ca  bladder  s/p  turbt  last  month,  now   with gross hematuria  urology  f/p   hold  a/c  urology/  dr scott  on amio/ lasix/ asa/ keppra/ beta  blocker  small  cell carcinoma bladder,     ct chest , no malignancy  per  oncology, pt needs adjuvant chemo, based on pathology pt  with htn, tia,  afib, ppm, cardiomyopathy      ca  bladder  s/p  turbt  last  month,  now   with gross hematuria  urology  f/p   hold  a/c  urology/  dr scott  on amio/ lasix/ asa/ keppra/ beta  blocker  small  cell carcinoma bladder,     ct chest , no malignancy  per  oncology, pt needs adjuvant chemo, based on pathology  pt is  aware of  his dx, oncology will speak to wife

## 2018-06-05 NOTE — DISCHARGE NOTE ADULT - PLAN OF CARE
stable bleeding has resolved  continue off ac Atrial fibrillation is the most common heart rhythm problem.  The condition puts you at risk for has stroke and heart attack  It helps if you control your blood pressure, not drink more than 1-2 alcohol drinks per day, cut down on caffeine, getting treatment for over active thyroid gland, and get regular exercise  Call your doctor if you feel your heart racing or beating unusually, chest tightness or pain, lightheaded, faint, shortness of breath especially with exercise  It is important to take your heart medication as prescribed  You may be on anticoagulation which is very important to take as directed - you may need blood work to monitor drug levels Follow up with your medical doctor to establish long term blood pressure treatment goals. Weigh yourself daily.  If you gain 3lbs in 3 days, or 5lbs in a week call your Health Care Provider.  Do not eat or drink foods containing more than 2000mg of salt (sodium) in your diet every day.  Call your Health Care Provider if you have any swelling or increased swelling in your feet, ankles, and/or stomach.  Take all of your medication as directed.  If you become dizzy call your Health Care Provider.

## 2018-06-05 NOTE — PROGRESS NOTE ADULT - SUBJECTIVE AND OBJECTIVE BOX
HPI:  CHIEF COMPLAINT:Patient is a 89y old  Male who presents with a chief complaint of severe hematuria since yesterday.    HPI:pt is a 89 yo male with hx of htn, a.fib ,sss, s/p ppm, s/p turbt, who presented to er with hematuria after taking a dose of eliquis 2.5 mg.  no chest pain,+ palpitation in a.fib with rvr.      PAST MEDICAL & SURGICAL HISTORY:  Pacemaker  HF (heart failure)  Seizure  Prostate cancer  BPH (benign prostatic hypertrophy)  PAF (paroxysmal atrial fibrillation)  TIA (Transient Ischemic Attack)  Prostate Ca: s/p seed implant  Hyperlipemia  HTN (Hypertension)  H/O prostate cancer: seed implant  No Past Surgical History      MEDICATIONS  (STANDING):    MEDICATIONS  (PRN):      FAMILY HISTORY:  No pertinent family history in first degree relatives      SOCIAL HISTORY:    [ ] Non-smoker  [ ] Smoker  [ ] Alcohol    Allergies    ACE inhibitors (Other)    Intolerances    	    REVIEW OF SYSTEMS:  CONSTITUTIONAL: No fever, weight loss, or fatigue  EYES: No eye pain, visual disturbances, or discharge  ENT:  No difficulty hearing, tinnitus, vertigo; No sinus or throat pain  NECK: No pain or stiffness  RESPIRATORY: No cough, wheezing, chills or hemoptysis; No Shortness of Breath  CARDIOVASCULAR: No chest pain, palpitations, passing out, dizziness, or leg swelling  GASTROINTESTINAL: No abdominal or epigastric pain. No nausea, vomiting, or hematemesis; No diarrhea or constipation. No melena or hematochezia.  GENITOURINARY: No dysuria, frequency, +  hematuria,   NEUROLOGICAL: No headaches, memory loss, loss of strength, numbness, or tremors  SKIN: No itching, burning, rashes, or lesions   LYMPH Nodes: No enlarged glands  ENDOCRINE: No heat or cold intolerance; No hair loss  MUSCULOSKELETAL: No joint pain or swelling; No muscle, back, or extremity pain  PSYCHIATRIC: No depression, anxiety, mood swings, or difficulty sleeping  HEME/LYMPH: No easy bruising, or bleeding gums  ALLERGY AND IMMUNOLOGIC: No hives or eczema	    [ ] All others negative	  [ ] Unable to obtain    PHYSICAL EXAM:  T(C): 36.9 (06-01-18 @ 16:19), Max: 37.3 (06-01-18 @ 14:58)  HR: 103 (06-01-18 @ 16:19) (103 - 103)  BP: 154/94 (06-01-18 @ 16:19) (139/85 - 154/94)  RR: 20 (06-01-18 @ 16:19) (18 - 20)  SpO2: 100% (06-01-18 @ 16:19) (98% - 100%)  Wt(kg): --  I&O's Summary      Appearance: Normal	  HEENT:   Normal oral mucosa, PERRL, EOMI	  Lymphatic: No lymphadenopathy  Cardiovascular: Normal S1 S2, No JVD, + murmurs, No edema  Respiratory: Lungs clear to auscultation	  Psychiatry: A & O x 3, Mood & affect appropriate  Gastrointestinal:  Soft, Non-tender, + BS	  Skin: No rashes, No ecchymoses, No cyanosis	  Neurologic: Non-focal  Extremities: Normal range of motion, No clubbing, cyanosis or edema  Vascular: Peripheral pulses palpable 2+ bilaterally    TELEMETRY: 	    ECG:  	  RADIOLOGY:  OTHER: 	  	  LABS:	 	    CARDIAC MARKERS:                proBNP:   Lipid Profile:   HgA1c:   TSH:       PREVIOUS DIAGNOSTIC TESTING:    [ ] Echocardiogram:  [ ]  Catheterization:  [ ] Stress Test: (01 Jun 2018 17:36)    PAST MEDICAL & SURGICAL HISTORY:  Pacemaker  HF (heart failure)  Seizure  Prostate cancer  BPH (benign prostatic hypertrophy)  PAF (paroxysmal atrial fibrillation)  TIA (Transient Ischemic Attack)  Prostate Ca: s/p seed implant  Hyperlipemia  HTN (Hypertension)  H/O prostate cancer: seed implant  No Past Surgical History    Medications:  digoxin     Tablet 0.125 milliGRAM(s) Oral daily  docusate sodium 100 milliGRAM(s) Oral three times a day  furosemide    Tablet 20 milliGRAM(s) Oral daily  levETIRAcetam 500 milliGRAM(s) Oral two times a day  metoprolol succinate  milliGRAM(s) Oral daily  mirtazapine 15 milliGRAM(s) Oral at bedtime  senna 2 Tablet(s) Oral at bedtime  simvastatin 20 milliGRAM(s) Oral at bedtime  tamsulosin Oral Tab/Cap - Peds 0.4 milliGRAM(s) Oral at bedtime    Labs:  CBC Full  -  ( 05 Jun 2018 07:20 )  WBC Count : 7.36 K/uL  Hemoglobin : 12.6 g/dL  Hematocrit : 38.0 %  Platelet Count - Automated : 295 K/uL  Mean Cell Volume : 89.0 fl  Mean Cell Hemoglobin : 29.5 pg  Mean Cell Hemoglobin Concentration : 33.2 gm/dL  Auto Neutrophil # : x  Auto Lymphocyte # : x  Auto Monocyte # : x  Auto Eosinophil # : x  Auto Basophil # : x  Auto Neutrophil % : x  Auto Lymphocyte % : x  Auto Monocyte % : x  Auto Eosinophil % : x  Auto Basophil % : x    06-05    138  |  99  |  26<H>  ----------------------------<  106<H>  3.9   |  28  |  1.06    Ca    9.4      05 Jun 2018 05:49  Mg     1.8     06-04        Radiology:             ROS:  Patient comfortable without distress  No SOB or chest pain  No palpitation  No abdominal pain, diarrhaea or constipation  No weakness of extremities  No skin changes or swelling of legs    Vital Signs Last 24 Hrs  T(C): 36.7 (05 Jun 2018 04:10), Max: 37.1 (04 Jun 2018 20:49)  T(F): 98.1 (05 Jun 2018 04:10), Max: 98.7 (04 Jun 2018 20:49)  HR: 100 (05 Jun 2018 04:10) (65 - 100)  BP: 118/54 (05 Jun 2018 04:10) (96/50 - 118/54)  BP(mean): --  RR: 18 (05 Jun 2018 04:10) (18 - 18)  SpO2: 94% (05 Jun 2018 04:10) (94% - 99%)    Physical exam:  Patient alert and oriented  No distress  CVS: S1, S2 regular or murmur  Chest: bilateral breath sound without rales  Abdomen: soft, not tender, no organomegaly or masses  No focal neuro deficit  No edema      Assessment and Plan: HPI:  Patient is a 89y old  Male who was admitted with a chief complaint of severe hematuria on 6/1/18.  He has hx of htn, a.fib on Eliquis ,SSS, s/p ppm, s/p TURBT  Urine is clear as per patient now.    PAST MEDICAL & SURGICAL HISTORY:  Pacemaker  HF (heart failure)  Seizure  Prostate cancer  BPH (benign prostatic hypertrophy)  PAF (paroxysmal atrial fibrillation)  TIA (Transient Ischemic Attack)  Prostate Ca: s/p seed implant  Hyperlipemia  HTN (Hypertension)  H/O prostate cancer: seed implant  No Past Surgical History    Medications:  digoxin     Tablet 0.125 milliGRAM(s) Oral daily  docusate sodium 100 milliGRAM(s) Oral three times a day  furosemide    Tablet 20 milliGRAM(s) Oral daily  levETIRAcetam 500 milliGRAM(s) Oral two times a day  metoprolol succinate  milliGRAM(s) Oral daily  mirtazapine 15 milliGRAM(s) Oral at bedtime  senna 2 Tablet(s) Oral at bedtime  simvastatin 20 milliGRAM(s) Oral at bedtime  tamsulosin Oral Tab/Cap - Peds 0.4 milliGRAM(s) Oral at bedtime    Labs:  CBC Full  -  ( 05 Jun 2018 07:20 )  WBC Count : 7.36 K/uL  Hemoglobin : 12.6 g/dL  Hematocrit : 38.0 %  Platelet Count - Automated : 295 K/uL  Mean Cell Volume : 89.0 fl  Mean Cell Hemoglobin : 29.5 pg  Mean Cell Hemoglobin Concentration : 33.2 gm/dL  Auto Neutrophil # : x  Auto Lymphocyte # : x  Auto Monocyte # : x  Auto Eosinophil # : x  Auto Basophil # : x  Auto Neutrophil % : x  Auto Lymphocyte % : x  Auto Monocyte % : x  Auto Eosinophil % : x  Auto Basophil % : x    06-05    138  |  99  |  26<H>  ----------------------------<  106<H>  3.9   |  28  |  1.06    Ca    9.4      05 Jun 2018 05:49  Mg     1.8     06-04        Radiology:             ROS:  Patient comfortable without distress  No SOB or chest pain  No palpitation  No abdominal pain, diarrhaea or constipation  No weakness of extremities  No skin changes or swelling of legs    Vital Signs Last 24 Hrs  T(C): 36.7 (05 Jun 2018 04:10), Max: 37.1 (04 Jun 2018 20:49)  T(F): 98.1 (05 Jun 2018 04:10), Max: 98.7 (04 Jun 2018 20:49)  HR: 100 (05 Jun 2018 04:10) (65 - 100)  BP: 118/54 (05 Jun 2018 04:10) (96/50 - 118/54)  BP(mean): --  RR: 18 (05 Jun 2018 04:10) (18 - 18)  SpO2: 94% (05 Jun 2018 04:10) (94% - 99%)    Physical exam:  Patient alert and oriented  No distress  CVS: S1, S2 regular or murmur  Chest: bilateral breath sound without rales  Abdomen: soft, not tender, no organomegaly or masses  No focal neuro deficit  No edema      Assessment and Plan:

## 2018-06-05 NOTE — DISCHARGE NOTE ADULT - ADDITIONAL INSTRUCTIONS
stable   Please f/u with your PCP in 4 to 7 days. stable   Please f/u with your PCP in 4 to 7 days.  Follow up with Dr Trejo Cardiology   Dr Byrd urology in order to determine resuming AC

## 2018-06-05 NOTE — DISCHARGE NOTE ADULT - CARE PROVIDER_API CALL
Efe Gonzalez (MD), Internal Medicine  [address] [place] [id number]  Phone: [phone]  Fax: [phone] Efe Gonzalez (MD), Internal Medicine  [address] [place] [id number]  Phone: [phone]  Fax: [phone]

## 2018-06-05 NOTE — PHYSICAL THERAPY INITIAL EVALUATION ADULT - PERTINENT HX OF CURRENT PROBLEM, REHAB EVAL
89 yo male with hx of htn, a.fib ,sss, s/p ppm, s/p turbt, who presented to er with hematuria after taking a dose of eliquis 2.5 mg 88 yo male with hx of htn, afib ,sss, s/p ppm, s/p turbt, who presented to er with hematuria after taking a dose of eliquis 2.5 mg

## 2018-06-05 NOTE — DISCHARGE NOTE ADULT - MEDICATION SUMMARY - MEDICATIONS TO TAKE
I will START or STAY ON the medications listed below when I get home from the hospital:    Tylenol  -- 2 tab(s) by mouth , As Needed  -- Indication: For pain     tamsulosin 0.4 mg oral capsule  -- 1 cap(s) by mouth once a day (at bedtime)  -- Indication: For bph     digoxin 125 mcg (0.125 mg) oral tablet  -- 1 tab(s) by mouth once a day  -- Indication: For Afib     levETIRAcetam 500 mg oral tablet  -- 1 tab(s) by mouth 2 times a day  -- Indication: For seizure     mirtazapine 15 mg oral tablet  -- 1 tab(s) by mouth once a day (at bedtime)  -- Indication: For insomnia     simvastatin 20 mg oral tablet  -- 1 tab(s) by mouth once a day (at bedtime)  -- Indication: For Hyperlipidemia     Metoprolol Succinate  mg oral tablet, extended release  -- 1 tab(s) by mouth once a day  -- It is very important that you take or use this exactly as directed.  Do not skip doses or discontinue unless directed by your doctor.  May cause drowsiness.  Alcohol may intensify this effect.  Use care when operating dangerous machinery.  Some non-prescription drugs may aggravate your condition.  Read all labels carefully.  If a warning appears, check with your doctor before taking.  Swallow whole.  Do not crush.  Take with food or milk.  This drug may impair the ability to drive or operate machinery.  Use care until you become familiar with its effects.    -- Indication: For HTN (hypertension)    furosemide 20 mg oral tablet  -- 1 tab(s) by mouth once a day  -- Indication: For Heart failure     docusate sodium 100 mg oral capsule  -- 1 cap(s) by mouth 3 times a day  -- Indication: For constipation     senna oral tablet  -- 2 tab(s) by mouth once a day (at bedtime)  -- Indication: For constipation I will START or STAY ON the medications listed below when I get home from the hospital:    predniSONE 20 mg oral tablet  -- 1 tab(s) by mouth once stop after 4 more days   -- Indication: For steroid     ibuprofen 200 mg oral tablet  -- 1 tab(s) by mouth once a day (before a meal) as needed for pain   -- Indication: For pain     tamsulosin 0.4 mg oral capsule  -- 1 cap(s) by mouth once a day (at bedtime)  -- Indication: For bph     digoxin 125 mcg (0.125 mg) oral tablet  -- 1 tab(s) by mouth once a day  -- Indication: For Afib     levETIRAcetam 500 mg oral tablet  -- 1 tab(s) by mouth 2 times a day  -- Indication: For seizure     mirtazapine 15 mg oral tablet  -- 1 tab(s) by mouth once a day (at bedtime)  -- Indication: For insomnia     simvastatin 20 mg oral tablet  -- 1 tab(s) by mouth once a day (at bedtime)  -- Indication: For Hyperlipidemia     Metoprolol Succinate  mg oral tablet, extended release  -- 1 tab(s) by mouth once a day  -- It is very important that you take or use this exactly as directed.  Do not skip doses or discontinue unless directed by your doctor.  May cause drowsiness.  Alcohol may intensify this effect.  Use care when operating dangerous machinery.  Some non-prescription drugs may aggravate your condition.  Read all labels carefully.  If a warning appears, check with your doctor before taking.  Swallow whole.  Do not crush.  Take with food or milk.  This drug may impair the ability to drive or operate machinery.  Use care until you become familiar with its effects.    -- Indication: For HTN (hypertension)    furosemide 20 mg oral tablet  -- 1 tab(s) by mouth once a day  -- Indication: For Heart failure     docusate sodium 100 mg oral capsule  -- 1 cap(s) by mouth 3 times a day  -- Indication: For constipation     senna oral tablet  -- 2 tab(s) by mouth once a day (at bedtime)  -- Indication: For constipation

## 2018-06-05 NOTE — CHART NOTE - NSCHARTNOTEFT_GEN_A_CORE
Notified by RN for left leg pain. Patient seen and examined at bedside, in NAD. Pt c/o sharp pain to lateral left thigh that started Notified by RN for left leg pain. Patient seen and examined at bedside, in NAD. Pt c/o sharp 8/10 pain to lateral left thigh that started tonight, Notified by RN for left leg pain. Patient seen and examined at bedside, in NAD. Pt c/o sharp 8/10 pain to lateral left thigh that started tonight while laying in bed, worse with movement. Pt reports one previous episode of similar pain in distant past that resolved on its own. Denies any recent trauma to area, numbness, tingling, weakness, paralysis, SOB, pain elsewhere. On exam, pt in NAD, no erythema/ecchymosis/swelling at site, no TTP, pedal pulse 2+, FROM though limited by pain, extremity warm and well perfused.     L thigh pain; seems to be muscular in origin  - tylenol for pain  - consider lidocaine patch if no improvement  - warm compresses to area  Will endorse to primary team in AM.      Bee Barnes PA-C  Department of Medicine  #[id number]

## 2018-06-05 NOTE — CHART NOTE - NSCHARTNOTEFT_GEN_A_CORE
MEDICINE PA      Event Summary: Notified by RN that patient complaining of L sided LE pain. Pt examined at bedside by. States that pain is located in L LE thigh region. Initially started as knee pain when he came to the hospital. Has tried acetaminophen/ tylenol/ lidoderm patch/ warm packs to poor effect. Pain is worse with movement, especially when bending knee. Wife at bedside states that patient has had TIA in the past, and           Vital Signs Last 24 Hrs  T(C): 37.3 (05 Jun 2018 21:11), Max: 37.3 (05 Jun 2018 21:11)  T(F): 99.1 (05 Jun 2018 21:11), Max: 99.1 (05 Jun 2018 21:11)  HR: 75 (05 Jun 2018 21:11) (60 - 100)  BP: 138/76 (05 Jun 2018 21:11) (101/51 - 138/76)  BP(mean): --  RR: 18 (05 Jun 2018 21:11) (18 - 18)  SpO2: 99% (05 Jun 2018 21:11) (94% - 99%)    Physical Assessment:  General: Awake, No acute distress.   Neurology: A&Ox3, nonfocal  CV: +S1S2, RRR.  No peripheral edema  Respiratory: Even, unlabored.  CTA B/L.    Abdomen:  +BS.  Soft, NT, ND.  No palpable mass  MSK: ELADIO x4.   Skin: Warm and Dry         A/P:                Nasreen Andujar PA-C   [facility]      Event Summary: Notified by RN that patient complaining of L sided LE pain. Pt examined at bedside by. States that pain is located in L LE thigh region. Initially started as knee pain when he came to the hospital. Has tried acetaminophen/ tylenol/ lidoderm patch/ warm packs to poor effect. Pain is worse with movement, especially when bending knee. Wife at bedside states that patient has had TIA in the past, and has walked with slight foot dragging since event.  Denies hx of gout, CP, SOB, numbness/tingling, loss of ROM, back pain, saddle anesthesia, paralysis, bowel/bladder incontinence, RLE pain    Vital Signs Last 24 Hrs  T(C): 37.3 (05 Jun 2018 21:11), Max: 37.3 (05 Jun 2018 21:11)  T(F): 99.1 (05 Jun 2018 21:11), Max: 99.1 (05 Jun 2018 21:11)  HR: 75 (05 Jun 2018 21:11) (60 - 100)  BP: 138/76 (05 Jun 2018 21:11) (101/51 - 138/76)  RR: 18 (05 Jun 2018 21:11) (18 - 18)  SpO2: 99% (05 Jun 2018 21:11) (94% - 99%)    Physical Assessment:  General: Awake, No acute distress.   Neurology: A&Ox3, nonfocal  Respiratory: Even, unlabored.    Abdomen:  +BS.  Soft, NT, ND.  No palpable mass  MSK: HENDRICKS x4. +SLR LLE at 30 degrees. -SLR R LE. Compartments soft b/l LE. Sensation intact to light touch b/l LE.   Skin: Warm and Dry     Radiology:   5/20: IMPRESSION:  There is no acute fracture or dislocation. Chondrocalcinosis is noted within the medial and lateral compartments of the knee. A moderate knee joint effusion is noted. A superior patellar enthesophyte is noted.     A/P:  Patient is 89 years old male who was admitted with hematuria. He has history of prostate cancer and had seed implants for them in past. He is status post TURBT on May 16, 2018.  At present his hematuria is resolved. Acutely presenting with recurrent L LE thigh pain.     1. LLE pain 2/2 MSK vs ?sciatic   - gabapentin 100 mg x1 now   - Neurovascular checks   - Given cancer hx- monitor for signs of cord compression   - Doppler if patient develops s/sx of DVT     Nasreen Andujar PA-C    Department of Medicine  [facility] [id number] MEDICINE PA      Event Summary: Notified by RN that patient complaining of L sided LE pain. Pt examined at bedside by. States that pain is located in L LE thigh region. Initially started as knee pain when he came to the hospital. Has tried acetaminophen/ tylenol/ lidoderm patch/ warm packs to poor effect. Pain is worse with movement, especially when bending knee. Wife at bedside states that patient has had TIA in the past, and has walked with slight foot dragging since event.  Denies hx of gout, CP, SOB, numbness/tingling, loss of ROM, back pain, saddle anesthesia, paralysis, bowel/bladder incontinence, RLE pain    Vital Signs Last 24 Hrs  T(C): 37.3 (05 Jun 2018 21:11), Max: 37.3 (05 Jun 2018 21:11)  T(F): 99.1 (05 Jun 2018 21:11), Max: 99.1 (05 Jun 2018 21:11)  HR: 75 (05 Jun 2018 21:11) (60 - 100)  BP: 138/76 (05 Jun 2018 21:11) (101/51 - 138/76)  RR: 18 (05 Jun 2018 21:11) (18 - 18)  SpO2: 99% (05 Jun 2018 21:11) (94% - 99%)    Physical Assessment:  General: Awake, No acute distress.   Neurology: A&Ox3, nonfocal  Respiratory: Even, unlabored.    Abdomen:  +BS.  Soft, NT, ND.  No palpable mass  MSK: HENDRICKS x4. +SLR LLE at 30 degrees. -SLR R LE. Compartments soft b/l LE. Sensation intact to light touch b/l LE. No podagra. ?Mild L sided knee effusion. negative anterior/posterior drawer test.  Skin: Warm and Dry     Radiology:   5/20: IMPRESSION:  There is no acute fracture or dislocation. Chondrocalcinosis is noted within the medial and lateral compartments of the knee. A moderate knee joint effusion is noted. A superior patellar enthesophyte is noted.     A/P:  Patient is 89 years old male who was admitted with hematuria. He has history of prostate cancer and had seed implants for them in past. He is status post TURBT on May 16, 2018.  At present his hematuria is resolved. Acutely presenting with recurrent L LE thigh pain.     1. LLE pain 2/2 unclear etiology MSK vs ?sciatic vs. r/o gout   - gabapentin 100 mg x1 now   - Neurovascular checks    - XR L knee ordered   - Uric acid added to AM labs  - Given cancer hx- monitor for signs of cord compression   - Doppler if patient develops s/sx of DVT. Doppler 5/20 negative for acute DVT   - Endorsed to primary team in AM. Attending to to follow.     Nasreen Andujar PA-C    Department of Medicine  Spectra [id number] MEDICINE PA      Event Summary: Notified by RN that patient complaining of L sided LE pain. Pt examined at bedside by. States that pain is located in L LE thigh region. Initially started as knee pain when he came to the hospital. Has tried acetaminophen/ tylenol/ lidoderm patch/ warm packs to poor effect. Pain is worse with movement, especially when bending knee. Wife at bedside states that patient has had TIA in the past, and has walked with slight foot dragging since event.  Denies hx of gout, CP, SOB, numbness/tingling, loss of ROM, back pain, saddle anesthesia, paralysis, bowel/bladder incontinence, RLE pain, trauma.    Vital Signs Last 24 Hrs  T(C): 37.3 (05 Jun 2018 21:11), Max: 37.3 (05 Jun 2018 21:11)  T(F): 99.1 (05 Jun 2018 21:11), Max: 99.1 (05 Jun 2018 21:11)  HR: 75 (05 Jun 2018 21:11) (60 - 100)  BP: 138/76 (05 Jun 2018 21:11) (101/51 - 138/76)  RR: 18 (05 Jun 2018 21:11) (18 - 18)  SpO2: 99% (05 Jun 2018 21:11) (94% - 99%)    Physical Assessment:  General: Awake, No acute distress.   Neurology: A&Ox3, nonfocal  Respiratory: Even, unlabored.    Abdomen:  +BS.  Soft, NT, ND.  No palpable mass  MSK: HENDRICKS x4. +SLR LLE at 30 degrees. -SLR R LE. Compartments soft b/l LE. Sensation intact to light touch b/l LE. No podagra. ?Mild L sided knee effusion. negative anterior/posterior drawer test.  Skin: Warm and Dry     Radiology:   5/20: IMPRESSION:  There is no acute fracture or dislocation. Chondrocalcinosis is noted within the medial and lateral compartments of the knee. A moderate knee joint effusion is noted. A superior patellar enthesophyte is noted.     A/P:  Patient is 89 years old male who was admitted with hematuria. He has history of prostate cancer and had seed implants for them in past. He is status post TURBT on May 16, 2018.  At present his hematuria is resolved. Acutely presenting with recurrent L LE thigh pain.     1. LLE pain 2/2 unclear etiology MSK vs ?sciatic vs. r/o gout   - gabapentin 100 mg x1 now   - Neurovascular checks    - XR L knee ordered   - Uric acid added to AM labs  - Given cancer hx- monitor for signs of cord compression   - Doppler if patient develops s/sx of DVT. Doppler 5/20 negative for acute DVT   - Endorsed to primary team in AM. Attending to to follow.     Nasreen Andujar PA-C    Department of Medicine  Spectra [id number] MEDICINE PA      Event Summary: Notified by RN that patient complaining of L sided LE pain. Pt examined at bedside by. States that pain is located in L LE thigh region. Initially started as knee pain when he came to the hospital. Has tried acetaminophen/ tylenol/ lidoderm patch/ warm packs to poor effect. Pain is worse with movement, especially when bending knee. Wife at bedside states that patient has had TIA in the past, and has walked with slight foot dragging since event.  Denies hx of gout, CP, SOB, numbness/tingling, loss of ROM, back pain, saddle anesthesia, paralysis, bowel/bladder incontinence, RLE pain, trauma.    Vital Signs Last 24 Hrs  T(C): 37.3 (05 Jun 2018 21:11), Max: 37.3 (05 Jun 2018 21:11)  T(F): 99.1 (05 Jun 2018 21:11), Max: 99.1 (05 Jun 2018 21:11)  HR: 75 (05 Jun 2018 21:11) (60 - 100)  BP: 138/76 (05 Jun 2018 21:11) (101/51 - 138/76)  RR: 18 (05 Jun 2018 21:11) (18 - 18)  SpO2: 99% (05 Jun 2018 21:11) (94% - 99%)    Physical Assessment:  General: Awake, No acute distress.   Neurology: A&Ox3, nonfocal  Respiratory: Even, unlabored.    Abdomen:  +BS.  Soft, NT, ND.  No palpable mass  MSK: HENDRICKS x4. +SLR LLE at 30 degrees. -SLR R LE. Compartments soft b/l LE. Sensation intact to light touch b/l LE. No podagra. ?Mild L sided knee effusion. negative anterior/posterior drawer test.  Skin: Warm and Dry     Radiology:   5/20: IMPRESSION:  There is no acute fracture or dislocation. Chondrocalcinosis is noted within the medial and lateral compartments of the knee. A moderate knee joint effusion is noted. A superior patellar enthesophyte is noted.     A/P:  Patient is 89 years old male who was admitted with hematuria. He has history of prostate cancer and had seed implants for them in past. He is status post TURBT on May 16, 2018.  At present his hematuria is resolved. Acutely presenting with recurrent L LE thigh pain.     1. LLE pain 2/2 unclear etiology MSK vs ?sciatic/radiculopathy vs. r/o gout   - gabapentin 100 mg x1 now   - Neurovascular checks    - XR L knee ordered   - Uric acid added to AM labs  - Given cancer hx- monitor for signs/sx of cord compression   - Doppler if patient develops s/sx of DVT. Doppler 5/20 negative for acute DVT   - Endorsed to primary team in AM. Attending to follow.     Nasreen Andujar PA-C    Department of Medicine  Spectra [id number]

## 2018-06-05 NOTE — DISCHARGE NOTE ADULT - HOSPITAL COURSE
Patient is a 89y old  Male htn, Afib ,sss, s/p ppm, s/p turbt, who presents with a chief complaint of severe hematuria since yesterday. A ct scan revealed bladder cancer. You have been taken off Eliquis per cardiology and hematuria has since resolved. You can restart ac once cleared with . You will need follow up with oncology after discharge from rehab. You are medically cleared and stable for discharge. Patient is a 89y old  Male htn, Afib ,sss, s/p ppm, s/p turbt, who presents with a chief complaint of severe hematuria since yesterday. A ct scan revealed bladder cancer. You have been taken off Eliquis per cardiology and hematuria has since resolved. You can restart ac once cleared with . You will need follow up with oncology after discharge from rehab. You are medically cleared and stable for discharge.  You have also been c/o left knee pain and wrist pain that starts at night. Responding well with steroid and NSAID as needed. Please follow up with Primary care once discharged from rehab. Patient is a 89y old  Male htn, Afib ,sss, s/p ppm, s/p turbt, who presents with a chief complaint of severe hematuria since yesterday. A ct scan revealed bladder cancer. You have been taken off Eliquis per cardiology and hematuria has since resolved. You can restart ac once cleared with . You will need follow up with oncology after discharge from rehab. You are medically cleared and stable for discharge.  You have also been c/o left knee pain and wrist pain that starts at night. Responding well with steroid and NSAID as needed. Please follow up with Primary care once discharged from rehab.  PT to SEE Urology to determine resuming AC   Follow with Dr Trejo Cardiology   Discussed med rec and dcp with Dr Robertson who's medically cleared PT for DC to Rehab Patient is a 89y old  Male htn, Afib ,sss, s/p ppm, s/p turbt, who presents with a chief complaint of severe hematuria since yesterday. A ct scan revealed bladder cancer. You have been taken off Eliquis per cardiology and hematuria has since resolved. You can restart ac once cleared with . You will need follow up with oncology after discharge from rehab. You are medically cleared and stable for discharge.  You have also been c/o left knee pain and wrist pain that starts at night. Responding well with steroid and NSAID as needed. Please follow up with Primary care once discharged from rehab.    PT to SEE Urology and Dr Trejo next week to determine timing to resume AC

## 2018-06-05 NOTE — PROGRESS NOTE ADULT - ASSESSMENT
Patient is 89 years old male who was admitted with hematuria. He has history of prostate cancer and had seed implants for them in past. He is status post TURBT on May 16, 2018.  At present his hematuria is resolving.  Workup:   < from: CT Chest No Cont (06.01.18 @ 13:58) >  Centrilobular emphysema.    Small bilateral pleural effusions.    Small cluster opacities and right middle lobe measuring up to 6 mm are   nonspecific. Follow-up CT in 3 months is recommended to ensure   stability/resolution.        < from: CT Abdomen and Pelvis w/wo IV Cont (04.27.18 @ 15:17) >  A 2.7 cm enhancing bladder mass.     No evidence of upper tract disease.    No evidence of adenopathy/metastatic disease.      Surgical Pathology Report (05.16.18 @ 11:01)    Surgical Pathology Report:   ACCESSION No:  [id number]    JESSICA DOWD                      1        Surgical Final Report          Final Diagnosis  Bladder tumor, right lateral wall, TURBT  -Infiltrating small cell carcinoma.  See note.  -The carcinoma invades muscularis propria (detrusor muscle).    Note: Immunohistochemically, the carcinoma is positive for  synaptophysin and chromogranin. The immunoprofile supports the  above diagnosis.    _______________________________________________________    Clinical History  s/p brachytherapy for prostate cancer >  10 years ago    Specimen(s) Submitted  1     Bladder  tumor right lateral wall    As per radiology studies the patient did not have evidence of metastatic disease.  However small cell cancer of the bladder is an aggressive disease and is usually treated with new adjuvant chemotherapy like in small cell lung cancer followed by local regional treatment.  Our patient is 89 years old and we will have to take into consideration his comorbidities along with his age and preference into consideration before planning chemotherapy.  I discussed today patient's diagnosis with patient and his wife and son on phone.   They were told that he has relatively rare kind of bladder cancer, scans do not show disease elsewhere but this tumor is usually spread even if scans do not show.   This kind of tumor is usually treated initially with chemo, which will be hard to tolerate before local therapy. Side affects were discussed briefly. They wanted to know if it is curable, were told that is uncommon.   They did not make up their mind completely but understood and asked if there is milder form of treatment.  We also discussed that RT can be given if he had recurrent hematuria for mainly symptom control, not cure.   Patient apparently is keen to go home and they will follow up as outpatient.   I feel that symptomatic conservative treatment is appropriate unless patient and family decide otherwise Patient is 89 years old male who was admitted with hematuria. He has history of prostate cancer and had seed implants for them in past. He is status post TURBT on May 16, 2018.  At present his hematuria is resolved.  Workup:   < from: CT Chest No Cont (06.01.18 @ 13:58) >  Centrilobular emphysema.    Small bilateral pleural effusions.    Small cluster opacities and right middle lobe measuring up to 6 mm are   nonspecific. Follow-up CT in 3 months is recommended to ensure   stability/resolution.        < from: CT Abdomen and Pelvis w/wo IV Cont (04.27.18 @ 15:17) >  A 2.7 cm enhancing bladder mass.     No evidence of upper tract disease.    No evidence of adenopathy/metastatic disease.      Surgical Pathology Report (05.16.18 @ 11:01)    Surgical Pathology Report:   ACCESSION No:  [id number]                 1  Final Diagnosis  Bladder tumor, right lateral wall, TURBT  -Infiltrating small cell carcinoma.  See note.  -The carcinoma invades muscularis propria (detrusor muscle).    Note: Immunohistochemically, the carcinoma is positive for  synaptophysin and chromogranin. The immunoprofile supports the  above diagnosis.    _______________________________________________________    Clinical History  s/p brachytherapy for prostate cancer >  10 years ago    Specimen(s) Submitted  1     Bladder  tumor right lateral wall    As per radiology studies the patient did not have evidence of metastatic disease but CT chest will need followup.  However small cell cancer of the bladder is an aggressive disease and is usually treated with malvin adjuvant chemotherapy like in small cell lung cancer followed by local regional treatment.  Our patient is 89 years old and we have to take into consideration his comorbidities along with his age and preference into before planning chemotherapy.  I discussed today patient's diagnosis with patient and his wife and son on phone.   They were told that he has relatively rare kind of bladder cancer, scans do not show disease elsewhere but this tumor is usually spread even if scans do not show.   This kind of tumor is usually treated initially with chemo, which will be hard to tolerate given his age etc, before local therapy. Side affects were discussed briefly. They wanted to know if it is curable, were told that is uncommon.   They did not make up their mind completely but understood and asked if there is milder form of treatment.  We also discussed that RT can be given if he had recurrent hematuria for mainly symptom control, not cure.   Patient apparently is keen to go home and they will follow up as outpatient.   I feel that symptomatic conservative treatment is appropriate unless patient and family decide otherwise

## 2018-06-05 NOTE — DISCHARGE NOTE ADULT - PATIENT PORTAL LINK FT
You can access the [facility] Patient Portal, offered by [facility], by registering with the following website: [email or web address]

## 2018-06-05 NOTE — PROGRESS NOTE ADULT - SUBJECTIVE AND OBJECTIVE BOX
- Patient seen and examined.  - In summary, patient is a 89 year old man who presented with hematuria. (2018 17:36)  - Today, patient is without complaints.         *****MEDICATIONS:    MEDICATIONS  (STANDING):  digoxin     Tablet 0.125 milliGRAM(s) Oral daily  docusate sodium 100 milliGRAM(s) Oral three times a day  furosemide    Tablet 20 milliGRAM(s) Oral daily  levETIRAcetam 500 milliGRAM(s) Oral two times a day  metoprolol succinate  milliGRAM(s) Oral daily  mirtazapine 15 milliGRAM(s) Oral at bedtime  senna 2 Tablet(s) Oral at bedtime  simvastatin 20 milliGRAM(s) Oral at bedtime  tamsulosin Oral Tab/Cap - Peds 0.4 milliGRAM(s) Oral at bedtime    MEDICATIONS  (PRN):               ***** REVIEW OF SYSTEM:  GEN: no fever, no chills, no pain  RESP: no SOB, no cough, no sputum  CVS: no chest pain, no palpitations, no edema  GI: no abdominal pain, no nausea, no vomiting, no constipation, no diarrhea  : no dysuria, no frequency  NEURO: no headache, no dizziness  PSYCH: no depression, not anxious  Derm : no itching, no rash         ***** VITAL SIGNS:    T(F): 98.1 (18 @ 04:10), Max: 98.7 (18 @ 20:49)  HR: 100 (18 @ 04:10) (65 - 100)  BP: 118/54 (18 @ 04:10) (96/50 - 118/54)  RR: 18 (18 @ 04:10) (18 - 18)  SpO2: 94% (18 @ 04:10) (94% - 99%)  Wt(kg): --  ,   I&O's Summary    2018 07:01  -  2018 07:00  --------------------------------------------------------  IN: 700 mL / OUT: 625 mL / NET: 75 mL                 *****PHYSICAL EXAM:  GEN: A&O X 3 , NAD , comfortable  HEENT: NCAT, EOMI, MMM, no icterus  NECK: Supple, No JVD  CVS: S1S2 , regular , No M/R/G appreciated  PULM: CTA B/L,  no W/R/R appreciated  ABD.: soft. non tender, non distended,  bowel sounds present  Extrem: intact pulses , no edema noted  Derm: No rash or ecchymosis noted  PSYCH: normal mood, no depression, not anxious         *****LAB AND IMAGIN.2   5.6   )-----------( 290      ( 2018 10:15 )             39.7               06-05    138  |  99  |  26<H>  ----------------------------<  106<H>  3.9   |  28  |  1.06    Ca    9.4      2018 05:49  Mg     1.8     06-04    [All pertinent recent Imaging/Reports reviewed]         *****A S S E S S M E N T   A N D   P L A N :  89M with hematuria s/p recent TURBT for squamous cell ca, a.fib with rvr  tele flutter, V paced   cont current cardiac meds  amio held per eps  urology f/u noted  AC on hold  hematuria improving  H/H stable  DCP     __________________________  DALILA Robertson D.O.

## 2018-06-05 NOTE — DISCHARGE NOTE ADULT - MEDICATION SUMMARY - MEDICATIONS TO STOP TAKING
I will STOP taking the medications listed below when I get home from the hospital:    amiodarone 200 mg oral tablet  -- 1 tab(s) by mouth once a day in the AM    Keflex 500 mg oral capsule  -- 1 cap(s) by mouth 3 times a day   -- Finish all this medication unless otherwise directed by prescriber.    digoxin 125 mcg (0.125 mg) oral tablet  -- 1 tab(s) by mouth once a day

## 2018-06-05 NOTE — PROGRESS NOTE ADULT - SUBJECTIVE AND OBJECTIVE BOX
resolving hematuria    REVIEW OF SYSTEMS:  CONSTITUTIONAL: No weakness,  no   fevers      RESPIRATORY:  No    shortness of breath  , no  wheezing  CARDIOVASCULAR: No chest pain,   no  palpitations, no  cough  GASTROINTESTINAL: No abdominal  pain, no  vomiting, no  diarrhea  NEUROLOGICAL: No  focal  weakness    MEDICATIONS  (STANDING):  digoxin     Tablet 0.125 milliGRAM(s) Oral daily  docusate sodium 100 milliGRAM(s) Oral three times a day  furosemide    Tablet 20 milliGRAM(s) Oral daily  levETIRAcetam 500 milliGRAM(s) Oral two times a day  metoprolol succinate  milliGRAM(s) Oral daily  mirtazapine 15 milliGRAM(s) Oral at bedtime  senna 2 Tablet(s) Oral at bedtime  simvastatin 20 milliGRAM(s) Oral at bedtime  tamsulosin Oral Tab/Cap - Peds 0.4 milliGRAM(s) Oral at bedtime    MEDICATIONS  (PRN):      Vital Signs Last 24 Hrs  T(C): 36.7 (05 Jun 2018 04:10), Max: 37.1 (04 Jun 2018 20:49)  T(F): 98.1 (05 Jun 2018 04:10), Max: 98.7 (04 Jun 2018 20:49)  HR: 100 (05 Jun 2018 04:10) (65 - 100)  BP: 118/54 (05 Jun 2018 04:10) (96/50 - 118/54)  BP(mean): --  RR: 18 (05 Jun 2018 04:10) (18 - 18)  SpO2: 94% (05 Jun 2018 04:10) (94% - 99%)  CAPILLARY BLOOD GLUCOSE        I&O's Summary    04 Jun 2018 07:01  -  05 Jun 2018 07:00  --------------------------------------------------------  IN: 700 mL / OUT: 625 mL / NET: 75 mL        PHYSICAL EXAM:  HEAD:  Atraumatic, Normocephalic  NECK: Supple, No   JVD  CHEST/LUNG:   no     rales,     no,    rhonchi  HEART: Regular rate and rhythm;         murmur  ABDOMEN: Soft, Nontender, ;   EXTREMITIES:        edema  NEUROLOGY:  alert    LABS:                        13.2   5.6   )-----------( 290      ( 04 Jun 2018 10:15 )             39.7     06-05    138  |  99  |  26<H>  ----------------------------<  106<H>  3.9   |  28  |  1.06    Ca    9.4      05 Jun 2018 05:49  Mg     1.8     06-04                      Thyroid Stimulating Hormone, Serum: 4.14 uIU/mL (04-20 @ 08:14)          Consultant(s) Notes Reviewed:      Care Discussed with Consultants/Other Providers:

## 2018-06-06 ENCOUNTER — APPOINTMENT (OUTPATIENT)
Dept: HEMATOLOGY ONCOLOGY | Facility: CLINIC | Age: 83
End: 2018-06-06

## 2018-06-06 LAB
ANION GAP SERPL CALC-SCNC: 11 MMOL/L — SIGNIFICANT CHANGE UP (ref 5–17)
BUN SERPL-MCNC: 26 MG/DL — HIGH (ref 7–23)
CALCIUM SERPL-MCNC: 9.2 MG/DL — SIGNIFICANT CHANGE UP (ref 8.4–10.5)
CHLORIDE SERPL-SCNC: 101 MMOL/L — SIGNIFICANT CHANGE UP (ref 96–108)
CO2 SERPL-SCNC: 27 MMOL/L — SIGNIFICANT CHANGE UP (ref 22–31)
CREAT SERPL-MCNC: 1.11 MG/DL — SIGNIFICANT CHANGE UP (ref 0.5–1.3)
GLUCOSE SERPL-MCNC: 95 MG/DL — SIGNIFICANT CHANGE UP (ref 70–99)
HCT VFR BLD CALC: 36 % — LOW (ref 39–50)
HGB BLD-MCNC: 11.8 G/DL — LOW (ref 13–17)
MCHC RBC-ENTMCNC: 29.1 PG — SIGNIFICANT CHANGE UP (ref 27–34)
MCHC RBC-ENTMCNC: 32.8 GM/DL — SIGNIFICANT CHANGE UP (ref 32–36)
MCV RBC AUTO: 88.9 FL — SIGNIFICANT CHANGE UP (ref 80–100)
PLATELET # BLD AUTO: 286 K/UL — SIGNIFICANT CHANGE UP (ref 150–400)
POTASSIUM SERPL-MCNC: 3.9 MMOL/L — SIGNIFICANT CHANGE UP (ref 3.5–5.3)
POTASSIUM SERPL-SCNC: 3.9 MMOL/L — SIGNIFICANT CHANGE UP (ref 3.5–5.3)
RBC # BLD: 4.05 M/UL — LOW (ref 4.2–5.8)
RBC # FLD: 14.5 % — SIGNIFICANT CHANGE UP (ref 10.3–14.5)
SODIUM SERPL-SCNC: 139 MMOL/L — SIGNIFICANT CHANGE UP (ref 135–145)
URATE SERPL-MCNC: 6 MG/DL — SIGNIFICANT CHANGE UP (ref 3.4–8.8)
WBC # BLD: 5.92 K/UL — SIGNIFICANT CHANGE UP (ref 3.8–10.5)
WBC # FLD AUTO: 5.92 K/UL — SIGNIFICANT CHANGE UP (ref 3.8–10.5)

## 2018-06-06 RX ADMIN — SIMVASTATIN 20 MILLIGRAM(S): 20 TABLET, FILM COATED ORAL at 21:16

## 2018-06-06 RX ADMIN — TAMSULOSIN HYDROCHLORIDE 0.4 MILLIGRAM(S): 0.4 CAPSULE ORAL at 21:17

## 2018-06-06 RX ADMIN — Medication 100 MILLIGRAM(S): at 05:38

## 2018-06-06 RX ADMIN — MIRTAZAPINE 15 MILLIGRAM(S): 45 TABLET, ORALLY DISINTEGRATING ORAL at 21:17

## 2018-06-06 RX ADMIN — LEVETIRACETAM 500 MILLIGRAM(S): 250 TABLET, FILM COATED ORAL at 05:38

## 2018-06-06 RX ADMIN — SENNA PLUS 2 TABLET(S): 8.6 TABLET ORAL at 21:16

## 2018-06-06 RX ADMIN — Medication 20 MILLIGRAM(S): at 05:38

## 2018-06-06 RX ADMIN — Medication 0.12 MILLIGRAM(S): at 05:38

## 2018-06-06 RX ADMIN — Medication 100 MILLIGRAM(S): at 21:17

## 2018-06-06 RX ADMIN — LEVETIRACETAM 500 MILLIGRAM(S): 250 TABLET, FILM COATED ORAL at 17:23

## 2018-06-06 NOTE — PROGRESS NOTE ADULT - SUBJECTIVE AND OBJECTIVE BOX
HPI:    Patient is a 89y old  Male who was admitted with a chief complaint of severe hematuria on 6/1/18.  He has hx of htn, a.fib on Eliquis ,SSS, s/p ppm, s/p TURBT  Urine is clear as per patient now.            PAST MEDICAL & SURGICAL HISTORY:  Pacemaker  HF (heart failure)  Seizure  Prostate cancer  BPH (benign prostatic hypertrophy)  PAF (paroxysmal atrial fibrillation)  TIA (Transient Ischemic Attack)  Prostate Ca: s/p seed implant  Hyperlipemia  HTN (Hypertension)  H/O prostate cancer: seed implant  No Past Surgical History    Medications:  digoxin     Tablet 0.125 milliGRAM(s) Oral daily  docusate sodium 100 milliGRAM(s) Oral three times a day  furosemide    Tablet 20 milliGRAM(s) Oral daily  levETIRAcetam 500 milliGRAM(s) Oral two times a day  metoprolol succinate  milliGRAM(s) Oral daily  mirtazapine 15 milliGRAM(s) Oral at bedtime  senna 2 Tablet(s) Oral at bedtime  simvastatin 20 milliGRAM(s) Oral at bedtime  tamsulosin Oral Tab/Cap - Peds 0.4 milliGRAM(s) Oral at bedtime    Labs:  CBC Full  -  ( 06 Jun 2018 08:21 )  WBC Count : 5.92 K/uL  Hemoglobin : 11.8 g/dL  Hematocrit : 36.0 %  Platelet Count - Automated : 286 K/uL  Mean Cell Volume : 88.9 fl  Mean Cell Hemoglobin : 29.1 pg  Mean Cell Hemoglobin Concentration : 32.8 gm/dL  Auto Neutrophil # : x  Auto Lymphocyte # : x  Auto Monocyte # : x  Auto Eosinophil # : x  Auto Basophil # : x  Auto Neutrophil % : x  Auto Lymphocyte % : x  Auto Monocyte % : x  Auto Eosinophil % : x  Auto Basophil % : x    06-06    139  |  101  |  26<H>  ----------------------------<  95  3.9   |  27  |  1.11    Ca    9.2      06 Jun 2018 06:05        Radiology:             ROS:  Patient comfortable without distress  No SOB or chest pain  No palpitation  No abdominal pain, diarrhaea or constipation  No weakness of extremities  No skin changes or swelling of legs    Vital Signs Last 24 Hrs  T(C): 36.7 (06 Jun 2018 03:48), Max: 37.3 (05 Jun 2018 21:11)  T(F): 98 (06 Jun 2018 03:48), Max: 99.1 (05 Jun 2018 21:11)  HR: 98 (06 Jun 2018 03:48) (60 - 98)  BP: 148/75 (06 Jun 2018 03:48) (101/51 - 148/75)  BP(mean): --  RR: 18 (06 Jun 2018 03:48) (18 - 18)  SpO2: 98% (06 Jun 2018 03:48) (98% - 99%)    Physical exam:  Patient alert and oriented  No distress  CVS: S1, S2 regular or murmur  Chest: bilateral breath sound without rales  Abdomen: soft, not tender, no organomegaly or masses  No focal neuro deficit  No edema      Assessment and Plan: HPI:    Patient is a 89y old  Male who was admitted with a chief complaint of severe hematuria on 6/1/18.  He has hx of htn, a.fib on Eliquis ,SSS, s/p ppm, s/p TURBT  Urine is clear as per patient now.  Patient's wife at bedside. Says he is weak and thus will go to [facility]. Was doing his own chores before this admission            PAST MEDICAL & SURGICAL HISTORY:  Pacemaker  HF (heart failure)  Seizure  Prostate cancer  BPH (benign prostatic hypertrophy)  PAF (paroxysmal atrial fibrillation)  TIA (Transient Ischemic Attack)  Prostate Ca: s/p seed implant  Hyperlipemia  HTN (Hypertension)  H/O prostate cancer: seed implant  No Past Surgical History    Medications:  digoxin     Tablet 0.125 milliGRAM(s) Oral daily  docusate sodium 100 milliGRAM(s) Oral three times a day  furosemide    Tablet 20 milliGRAM(s) Oral daily  levETIRAcetam 500 milliGRAM(s) Oral two times a day  metoprolol succinate  milliGRAM(s) Oral daily  mirtazapine 15 milliGRAM(s) Oral at bedtime  senna 2 Tablet(s) Oral at bedtime  simvastatin 20 milliGRAM(s) Oral at bedtime  tamsulosin Oral Tab/Cap - Peds 0.4 milliGRAM(s) Oral at bedtime    Labs:  CBC Full  -  ( 06 Jun 2018 08:21 )  WBC Count : 5.92 K/uL  Hemoglobin : 11.8 g/dL  Hematocrit : 36.0 %  Platelet Count - Automated : 286 K/uL  Mean Cell Volume : 88.9 fl  Mean Cell Hemoglobin : 29.1 pg  Mean Cell Hemoglobin Concentration : 32.8 gm/dL  Auto Neutrophil # : x  Auto Lymphocyte # : x  Auto Monocyte # : x  Auto Eosinophil # : x  Auto Basophil # : x  Auto Neutrophil % : x  Auto Lymphocyte % : x  Auto Monocyte % : x  Auto Eosinophil % : x  Auto Basophil % : x    06-06    139  |  101  |  26<H>  ----------------------------<  95  3.9   |  27  |  1.11    Ca    9.2      06 Jun 2018 06:05        Radiology:             ROS:  Patient comfortable without distress  No SOB or chest pain  No palpitation  No abdominal pain, diarrhaea or constipation  Urine clean now  No skin changes or swelling of legs    Vital Signs Last 24 Hrs  T(C): 36.7 (06 Jun 2018 03:48), Max: 37.3 (05 Jun 2018 21:11)  T(F): 98 (06 Jun 2018 03:48), Max: 99.1 (05 Jun 2018 21:11)  HR: 98 (06 Jun 2018 03:48) (60 - 98)  BP: 148/75 (06 Jun 2018 03:48) (101/51 - 148/75)  BP(mean): --  RR: 18 (06 Jun 2018 03:48) (18 - 18)  SpO2: 98% (06 Jun 2018 03:48) (98% - 99%)    Physical exam:  Patient alert and oriented  No distress  CVS: S1, S2 regular or murmur  Chest: bilateral breath sound without rales  Abdomen: soft, not tender, no organomegaly or masses  No focal neuro deficit  No edema      Assessment and Plan:

## 2018-06-06 NOTE — PROGRESS NOTE ADULT - SUBJECTIVE AND OBJECTIVE BOX
- Patient seen and examined.  - In summary, patient is a 89 year old man who presented with hematuria. (2018 17:36)  - Today, patient is without complaints.         *****MEDICATIONS:    MEDICATIONS  (STANDING):  digoxin     Tablet 0.125 milliGRAM(s) Oral daily  docusate sodium 100 milliGRAM(s) Oral three times a day  furosemide    Tablet 20 milliGRAM(s) Oral daily  levETIRAcetam 500 milliGRAM(s) Oral two times a day  metoprolol succinate  milliGRAM(s) Oral daily  mirtazapine 15 milliGRAM(s) Oral at bedtime  senna 2 Tablet(s) Oral at bedtime  simvastatin 20 milliGRAM(s) Oral at bedtime  tamsulosin Oral Tab/Cap - Peds 0.4 milliGRAM(s) Oral at bedtime    MEDICATIONS  (PRN):                 ***** REVIEW OF SYSTEM:  GEN: no fever, no chills, no pain  RESP: no SOB, no cough, no sputum  CVS: no chest pain, no palpitations, no edema  GI: no abdominal pain, no nausea, no vomiting, no constipation, no diarrhea  : no dysuria, no frequency  NEURO: no headache, no dizziness  PSYCH: no depression, not anxious  Derm : no itching, no rash         ***** VITAL SIGNS:    T(F): 98 (18 @ 03:48), Max: 99.1 (18 @ 21:11)  HR: 98 (18 @ 03:48) (60 - 98)  BP: 148/75 (18 @ 03:48) (101/51 - 148/75)  RR: 18 (18 @ 03:48) (18 - 18)  SpO2: 98% (18 @ 03:48) (98% - 99%)  Wt(kg): --  ,   I&O's Summary    2018 07:01  -  2018 07:00  --------------------------------------------------------  IN: 1200 mL / OUT: 300 mL / NET: 900 mL                 *****PHYSICAL EXAM:  GEN: A&O X 3 , NAD , comfortable  HEENT: NCAT, EOMI, MMM, no icterus  NECK: Supple, No JVD  CVS: S1S2 , regular , No M/R/G appreciated  PULM: CTA B/L,  no W/R/R appreciated  ABD.: soft. non tender, non distended,  bowel sounds present  Extrem: intact pulses , no edema noted  Derm: No rash or ecchymosis noted  PSYCH: normal mood, no depression, not anxious         *****LAB AND IMAGIN.6   7.36  )-----------( 295      ( 2018 07:20 )             38.0               06-06    139  |  101  |  26<H>  ----------------------------<  95  3.9   |  27  |  1.11    Ca    9.2      2018 06:05  Mg     1.8     06-04    [All pertinent recent Imaging/Reports reviewed]         *****A S S E S S M E N T   A N D   P L A N :  89M with hematuria s/p recent TURBT for squamous cell ca, a.fib with rvr  tele flutter, V paced   cont current cardiac meds  amio held per eps  urology f/u  AC on hold  hematuria improving  H/H stable  DCP   resume AC when clear per   onc f/u out pt    __________________________  DALILA Robertson D.O.

## 2018-06-06 NOTE — PROGRESS NOTE ADULT - SUBJECTIVE AND OBJECTIVE BOX
resolving hematuria    REVIEW OF SYSTEMS:  CONSTITUTIONAL: No weakness,  no   fevers      RESPIRATORY:  No    shortness of breath  , no  wheezing  CARDIOVASCULAR: No chest pain,   no  palpitations, no  cough  GASTROINTESTINAL: No abdominal  pain, no  vomiting, no  diarrhea  NEUROLOGICAL: No  focal  weakness    MEDICATIONS  (STANDING):  digoxin     Tablet 0.125 milliGRAM(s) Oral daily  docusate sodium 100 milliGRAM(s) Oral three times a day  furosemide    Tablet 20 milliGRAM(s) Oral daily  levETIRAcetam 500 milliGRAM(s) Oral two times a day  metoprolol succinate  milliGRAM(s) Oral daily  mirtazapine 15 milliGRAM(s) Oral at bedtime  senna 2 Tablet(s) Oral at bedtime  simvastatin 20 milliGRAM(s) Oral at bedtime  tamsulosin Oral Tab/Cap - Peds 0.4 milliGRAM(s) Oral at bedtime    MEDICATIONS  (PRN):      Vital Signs Last 24 Hrs  T(C): 36.7 (06 Jun 2018 03:48), Max: 37.3 (05 Jun 2018 21:11)  T(F): 98 (06 Jun 2018 03:48), Max: 99.1 (05 Jun 2018 21:11)  HR: 98 (06 Jun 2018 03:48) (60 - 98)  BP: 148/75 (06 Jun 2018 03:48) (101/51 - 148/75)  BP(mean): --  RR: 18 (06 Jun 2018 03:48) (18 - 18)  SpO2: 98% (06 Jun 2018 03:48) (98% - 99%)  CAPILLARY BLOOD GLUCOSE        I&O's Summary    05 Jun 2018 07:01  -  06 Jun 2018 07:00  --------------------------------------------------------  IN: 1200 mL / OUT: 300 mL / NET: 900 mL        PHYSICAL EXAM:  HEAD:  Atraumatic, Normocephalic  NECK: Supple, No   JVD  CHEST/LUNG:   no     rales,     no,    rhonchi  HEART: Regular rate and rhythm;         murmur  ABDOMEN: Soft, Nontender, ;   EXTREMITIES:        edema  NEUROLOGY:  alert    LABS:                        12.6   7.36  )-----------( 295      ( 05 Jun 2018 07:20 )             38.0     06-06    139  |  101  |  26<H>  ----------------------------<  95  3.9   |  27  |  1.11    Ca    9.2      06 Jun 2018 06:05  Mg     1.8     06-04                      Thyroid Stimulating Hormone, Serum: 4.14 uIU/mL (04-20 @ 08:14)          Consultant(s) Notes Reviewed:      Care Discussed with Consultants/Other Providers:

## 2018-06-06 NOTE — PROGRESS NOTE ADULT - ASSESSMENT
Patient is 89 years old male who was admitted with hematuria. He has history of prostate cancer and had seed implants for them in past. He is status post TURBT on May 16, 2018.  At present his hematuria is resolved.  Workup:   < from: CT Chest No Cont (06.01.18 @ 13:58) >  Centrilobular emphysema.    Small bilateral pleural effusions.    Small cluster opacities and right middle lobe measuring up to 6 mm are   nonspecific. Follow-up CT in 3 months is recommended to ensure   stability/resolution.        < from: CT Abdomen and Pelvis w/wo IV Cont (04.27.18 @ 15:17) >  A 2.7 cm enhancing bladder mass.     No evidence of upper tract disease.    No evidence of adenopathy/metastatic disease.      Surgical Pathology Report (05.16.18 @ 11:01)    Surgical Pathology Report:   ACCESSION No:  [id number]                 1  Final Diagnosis  Bladder tumor, right lateral wall, TURBT  -Infiltrating small cell carcinoma.  See note.  -The carcinoma invades muscularis propria (detrusor muscle).    Note: Immunohistochemically, the carcinoma is positive for  synaptophysin and chromogranin. The immunoprofile supports the  above diagnosis.    _______________________________________________________    Clinical History  s/p brachytherapy for prostate cancer >  10 years ago    Specimen(s) Submitted  1     Bladder  tumor right lateral wall    As per radiology studies the patient did not have evidence of metastatic disease but CT chest will need followup.  However small cell cancer of the bladder is an aggressive disease and is usually treated with malvin adjuvant chemotherapy like in small cell lung cancer followed by local regional treatment.  Our patient is 89 years old and we have to take into consideration his comorbidities along with his age and preference into before planning chemotherapy.  I discussed today patient's diagnosis with patient and his wife and son on phone.   They were told that he has relatively rare kind of bladder cancer, scans do not show disease elsewhere but this tumor is usually spread even if scans do not show.   This kind of tumor is usually treated initially with chemo, which will be hard to tolerate given his age etc, before local therapy. Side affects were discussed briefly. They wanted to know if it is curable, were told that is uncommon.   They did not make up their mind completely but understood and asked if there is milder form of treatment.  We also discussed that RT can be given if he had recurrent hematuria for mainly symptom control, not cure.   Patient apparently is keen to go home and they will follow up as outpatient.   I feel that symptomatic conservative treatment is appropriate unless patient and family decide otherwise Patient is 89 years old male who was admitted with hematuria. He has history of prostate cancer and had seed implants for them in past. He is status post TURBT on May 16, 2018.  At present his hematuria is resolved.  Workup:   < from: CT Chest No Cont (06.01.18 @ 13:58) >  Centrilobular emphysema.    Small bilateral pleural effusions.    Small cluster opacities and right middle lobe measuring up to 6 mm are   nonspecific. Follow-up CT in 3 months is recommended to ensure   stability/resolution.        < from: CT Abdomen and Pelvis w/wo IV Cont (04.27.18 @ 15:17) >  A 2.7 cm enhancing bladder mass.     No evidence of upper tract disease.    No evidence of adenopathy/metastatic disease.      Surgical Pathology Report (05.16.18 @ 11:01)    Surgical Pathology Report:   ACCESSION No:  [id number]                 1  Final Diagnosis  Bladder tumor, right lateral wall, TURBT  -Infiltrating small cell carcinoma.  See note.  -The carcinoma invades muscularis propria (detrusor muscle).    Note: Immunohistochemically, the carcinoma is positive for  synaptophysin and chromogranin. The immunoprofile supports the  above diagnosis.    _______________________________________________________    Clinical History  s/p brachytherapy for prostate cancer >  10 years ago    Specimen(s) Submitted  1     Bladder  tumor right lateral wall    As per radiology studies the patient did not have evidence of metastatic disease but CT chest will need followup.  I told patient's wife today that patient is 89 years old and we have to take into consideration his comorbidities, PS along with his age and preference into before planning treatment.  He has relatively rare kind of bladder cancer, scans do not show disease elsewhere but this tumor is usually spread even if scans do not show and is aggressive  This kind of tumor is usually treated initially with chemo, which will be hard to tolerate given his age, PS etc, before local therapy. He will also need brain imaging and repeat CT abdomen before considering any Rx  Since he is weak and unable to ambulate, he is going to rehab.   Patient's wife stated that she would like to have full discussion regarding management and side affects after he is d/c from rehab in presence of her children before making final decision regarding any active treatment vs supportive care alone.   I told her that they can set up appt as he is getting out of rehab.   However she understood that long term prognosis is not good

## 2018-06-06 NOTE — PROGRESS NOTE ADULT - ASSESSMENT
pt  with htn, tia,  afib, ppm, cardiomyopathy      ca  bladder  s/p  turbt  last  month,  now   with gross hematuria  urology  f/p   hold  a/c  urology/  dr scott  on amio/ lasix/ asa/ keppra/ beta  blocker  small  cell carcinoma bladder,     ct chest , no malignancy  per  oncology, pt needs adjuvant chemo, based on pathology  pt is  aware of  his dx,   oncology   spojke  with  pt/  family

## 2018-06-06 NOTE — CHART NOTE - NSCHARTNOTEFT_GEN_A_CORE
Pt.  cleared and stable for discharge to rehab. [facility]   [facility]

## 2018-06-07 RX ORDER — GABAPENTIN 400 MG/1
100 CAPSULE ORAL
Qty: 0 | Refills: 0 | Status: DISCONTINUED | OUTPATIENT
Start: 2018-06-07 | End: 2018-06-08

## 2018-06-07 RX ORDER — GABAPENTIN 400 MG/1
300 CAPSULE ORAL ONCE
Qty: 0 | Refills: 0 | Status: COMPLETED | OUTPATIENT
Start: 2018-06-07 | End: 2018-06-07

## 2018-06-07 RX ORDER — IBUPROFEN 200 MG
1 TABLET ORAL
Qty: 0 | Refills: 0 | COMMUNITY
Start: 2018-06-07

## 2018-06-07 RX ORDER — IBUPROFEN 200 MG
200 TABLET ORAL ONCE
Qty: 0 | Refills: 0 | Status: COMPLETED | OUTPATIENT
Start: 2018-06-07 | End: 2018-06-07

## 2018-06-07 RX ORDER — IBUPROFEN 200 MG
600 TABLET ORAL ONCE
Qty: 0 | Refills: 0 | Status: COMPLETED | OUTPATIENT
Start: 2018-06-07 | End: 2018-06-07

## 2018-06-07 RX ORDER — DOCUSATE SODIUM 100 MG
1 CAPSULE ORAL
Qty: 0 | Refills: 0 | COMMUNITY
Start: 2018-06-07

## 2018-06-07 RX ORDER — SENNA PLUS 8.6 MG/1
2 TABLET ORAL
Qty: 0 | Refills: 0 | COMMUNITY
Start: 2018-06-07

## 2018-06-07 RX ORDER — ACETAMINOPHEN 500 MG
2 TABLET ORAL
Qty: 0 | Refills: 0 | COMMUNITY

## 2018-06-07 RX ADMIN — Medication 20 MILLIGRAM(S): at 11:53

## 2018-06-07 RX ADMIN — Medication 200 MILLIGRAM(S): at 11:53

## 2018-06-07 RX ADMIN — LEVETIRACETAM 500 MILLIGRAM(S): 250 TABLET, FILM COATED ORAL at 06:47

## 2018-06-07 RX ADMIN — GABAPENTIN 100 MILLIGRAM(S): 400 CAPSULE ORAL at 17:11

## 2018-06-07 RX ADMIN — Medication 200 MILLIGRAM(S): at 12:24

## 2018-06-07 RX ADMIN — SIMVASTATIN 20 MILLIGRAM(S): 20 TABLET, FILM COATED ORAL at 22:38

## 2018-06-07 RX ADMIN — MIRTAZAPINE 15 MILLIGRAM(S): 45 TABLET, ORALLY DISINTEGRATING ORAL at 22:38

## 2018-06-07 RX ADMIN — Medication 100 MILLIGRAM(S): at 22:38

## 2018-06-07 RX ADMIN — Medication 100 MILLIGRAM(S): at 06:47

## 2018-06-07 RX ADMIN — TAMSULOSIN HYDROCHLORIDE 0.4 MILLIGRAM(S): 0.4 CAPSULE ORAL at 22:38

## 2018-06-07 RX ADMIN — LEVETIRACETAM 500 MILLIGRAM(S): 250 TABLET, FILM COATED ORAL at 17:10

## 2018-06-07 RX ADMIN — SENNA PLUS 2 TABLET(S): 8.6 TABLET ORAL at 22:38

## 2018-06-07 RX ADMIN — Medication 20 MILLIGRAM(S): at 06:47

## 2018-06-07 RX ADMIN — GABAPENTIN 300 MILLIGRAM(S): 400 CAPSULE ORAL at 01:56

## 2018-06-07 RX ADMIN — Medication 600 MILLIGRAM(S): at 02:26

## 2018-06-07 RX ADMIN — Medication 0.12 MILLIGRAM(S): at 06:47

## 2018-06-07 RX ADMIN — Medication 600 MILLIGRAM(S): at 01:56

## 2018-06-07 NOTE — CHART NOTE - NSCHARTNOTESELECT_GEN_ALL_CORE
Event Note/L Leg pain
Event Note
Event Note/L leg pain
L wrist pain/Event Note

## 2018-06-07 NOTE — CHART NOTE - NSCHARTNOTEFT_GEN_A_CORE
Called by RN that pt. with right foot pain earlier in the day. Mild swellling noted, warm to touch, no redness, no signs of infection. Checked on patient later in day. Pt. started on Neurontin. Pt. stated pain subsided and is only painful when touched. Attending made aware. Family uncomfortable with pt. leaving to [facility].  Anticipate discharge to [facility] in am. [facility], Called by RN that pt. with right foot pain earlier in the day. Mild swellling noted, warm to touch, no redness, no signs of infection. Checked on patient later in day. Pt. started on Neurontin. Pt. stated pain subsided and is only painful when touched.  Family uncomfortable with pt. leaving to [facility].  Anticipate discharge to [facility] in am. Dr. Robertson aware. [facility]-BC,

## 2018-06-07 NOTE — PROVIDER CONTACT NOTE (OTHER) - BACKGROUND
Pt admitted for hematuria (resolved), hx of bladder ca s/p TURBT in 5/2018. X ray L knee from recent admission (-).

## 2018-06-07 NOTE — PROVIDER CONTACT NOTE (OTHER) - RECOMMENDATIONS
Consider different medications to control pn. Pt has tried Tylenol, lidocaine patches, and gabapentin 100mg, all w/o efficacy.

## 2018-06-07 NOTE — PROVIDER CONTACT NOTE (OTHER) - ASSESSMENT
LLE- 7/10 shooting recurrent pn this admission. Noted pn only occurs when pt supine. Pulses palpable, no discoloration, extremity warm to touch. Pt denies numbness or tingling.  L wrist- 7/10 new pn. Limited ROM. Pulses palpable. No edema noted.

## 2018-06-07 NOTE — PROGRESS NOTE ADULT - SUBJECTIVE AND OBJECTIVE BOX
- Patient seen and examined.  - In summary, patient is a 89 year old man who presented with hematuria. (2018 17:36)  - Today, patient is without complaints.         *****MEDICATIONS:    MEDICATIONS  (STANDING):  digoxin     Tablet 0.125 milliGRAM(s) Oral daily  docusate sodium 100 milliGRAM(s) Oral three times a day  furosemide    Tablet 20 milliGRAM(s) Oral daily  levETIRAcetam 500 milliGRAM(s) Oral two times a day  metoprolol succinate  milliGRAM(s) Oral daily  mirtazapine 15 milliGRAM(s) Oral at bedtime  senna 2 Tablet(s) Oral at bedtime  simvastatin 20 milliGRAM(s) Oral at bedtime  tamsulosin Oral Tab/Cap - Peds 0.4 milliGRAM(s) Oral at bedtime    MEDICATIONS  (PRN):                   ***** REVIEW OF SYSTEM:  GEN: no fever, no chills, no pain  RESP: no SOB, no cough, no sputum  CVS: no chest pain, no palpitations, no edema  GI: no abdominal pain, no nausea, no vomiting, no constipation, no diarrhea  : no dysuria, no frequency  NEURO: no headache, no dizziness  PSYCH: no depression, not anxious  Derm : no itching, no rash         ***** VITAL SIGNS:    T(F): 98 (18 @ 04:02), Max: 98.1 (18 @ 20:43)  HR: 67 (18 @ 04:02) (64 - 67)  BP: 165/83 (18 @ 04:02) (112/61 - 165/83)  RR: 18 (18 @ 04:02) (18 - 18)  SpO2: 98% (18 @ 04:02) (98% - 99%)  Wt(kg): --  ,   I&O's Summary    2018 07:01  -  2018 07:00  --------------------------------------------------------  IN: 960 mL / OUT: 650 mL / NET: 310 mL    2018 07:01  -  2018 10:14  --------------------------------------------------------  IN: 360 mL / OUT: 0 mL / NET: 360 mL           *****PHYSICAL EXAM:  GEN: A&O X 3 , NAD , comfortable  HEENT: NCAT, EOMI, MMM, no icterus  NECK: Supple, No JVD  CVS: S1S2 , regular , No M/R/G appreciated  PULM: CTA B/L,  no W/R/R appreciated  ABD.: soft. non tender, non distended,  bowel sounds present  Extrem: intact pulses , no edema noted  Derm: No rash or ecchymosis noted  PSYCH: normal mood, no depression, not anxious         *****LAB AND IMAGIN.8   5.92  )-----------( 286      ( 2018 08:21 )             36.0               06-    139  |  101  |  26<H>  ----------------------------<  95  3.9   |  27  |  1.11    Ca    9.2      2018 06:05    [All pertinent recent Imaging/Reports reviewed]         *****A S S E S S M E N T   A N D   P L A N :  89M with hematuria s/p recent TURBT for squamous cell ca, a.fib with rvr  tele flutter, V paced   cont current cardiac meds  amio held per eps  urology f/u  AC on hold  hematuria improved  H/H stable  resume AC when clear per   onc f/u out pt  DCP     __________________________  DALILA Robertson D.O.

## 2018-06-07 NOTE — CHART NOTE - NSCHARTNOTEFT_GEN_A_CORE
Pt. for discharge to rehabilitation center. Pt. seen and examined , pt. still with c/o left wrist pain. Joint  non tender, no swelling noted, no WBC, no signs of infection noted. Pain on flexion. Prednisone and NSAIDs administered.  Will hold off on imaging per attending. If pt. with good response will discharge to [facility] and will follow up outpatient with PCP. All meds reviewed and reconciled for discharge.    Christal ANDRE-BC

## 2018-06-07 NOTE — CHART NOTE - NSCHARTNOTEFT_GEN_A_CORE
Notified by RN for left wrist pain. Patient seen and examined at bedside, Notified by RN for left wrist pain. Patient seen and examined at bedside in [facility], family at bedside. Pt states that wrist pain began this morning and is felt only with movement, 7/10 in severity. No previous episodes of wrist pain, though sensation feels similar to the left leg pain he has been experiencing. Pt notes that his L leg pain has also returned. Leg pain did not previously improve with tylenol, lidocaine or warm compresses. Patient received gabapentin last night but unable to recall if it helped, though he states he has been pain-free during the day. Denies any recent trauma or fall, numbness, tingling, paralysis, coolness or discoloration of extremity. Denies CP, neck pain, back pain, saddle anesthesia, bowel/bladder incontinence.     Vital Signs Last 24 Hrs  T(C): 36.7 (06 Jun 2018 20:43), Max: 36.7 (06 Jun 2018 03:48)  T(F): 98.1 (06 Jun 2018 20:43), Max: 98.1 (06 Jun 2018 20:43)  HR: 65 (06 Jun 2018 20:43) (64 - 98)  BP: 130/65 (06 Jun 2018 20:43) (112/61 - 148/75)  BP(mean): --  RR: 18 (06 Jun 2018 20:43) (18 - 18)  SpO2: 99% (06 Jun 2018 20:43) (98% - 99%)    PHYSICAL EXAM:   Constitutional: comfortable-appearing lying in bed, NAD  Neuro: A&Ox3, no focal deficits  Card: S1 S2, RRR  Lung: CTA b/l, nonlabored  Abd: soft, nondistended, nontender  MSK: mild swelling noted to left wrist, + tenderness to light palpation on dorsal/palmar/lateral surfaces of wrist, ROM at wrist limited 2/2 to pain, FROM in digits/L elbow/RUE, sensation to light touch intact  Extremities: Warm and well-perfused x4, distal pulses 2+    LABS:  Uric Acid, Serum: 6.0 mg/dL (06.06.18 @ 06:05)    RADIOLOGY:  Xray Knee 1 or 2 Views, Left (05.20.18 @ 01:49)   There is no acute fracture or dislocation. Chondrocalcinosis is noted   within the medial and lateral compartments of the knee. A moderate knee   joint effusion is noted. A superior patellar enthesophyte is noted.     A/P: Patient is 89 years old male who was admitted with hematuria. He has history of prostate cancer and had seed implants for them in past. He is status post TURBT on May 16, 2018.  At present his hematuria is resolved. Acutely presenting with recurrent L wrist pain and intermittent L leg pain.     # L wrist / L leg pain; pseudogout vs arthritic process vs ?neuropathic. Uric acid normal, r/o'ed gout.  - XR L wrist ordered  - NSAIDS prn pain  - gabapentin x 1 dose, since patient had been pain-free during the day after yesterday night's dose  - possible outpatient rheum f/u  - continue to monitor  Will discuss with primary team in AM.      Bee Barnes PA-C  Department of Medicine  #[id number]

## 2018-06-07 NOTE — PROGRESS NOTE ADULT - SUBJECTIVE AND OBJECTIVE BOX
no  cp/sob  , ambulating  well  REVIEW OF SYSTEMS:  CONSTITUTIONAL: No weakness,  no   fevers      RESPIRATORY:  No    shortness of breath  , no  wheezing  CARDIOVASCULAR: No chest pain,   no  palpitations, no  cough  GASTROINTESTINAL: No abdominal  pain, no  vomiting, no  diarrhea  NEUROLOGICAL: No  focal  weakness    MEDICATIONS  (STANDING):  digoxin     Tablet 0.125 milliGRAM(s) Oral daily  docusate sodium 100 milliGRAM(s) Oral three times a day  furosemide    Tablet 20 milliGRAM(s) Oral daily  levETIRAcetam 500 milliGRAM(s) Oral two times a day  metoprolol succinate  milliGRAM(s) Oral daily  mirtazapine 15 milliGRAM(s) Oral at bedtime  senna 2 Tablet(s) Oral at bedtime  simvastatin 20 milliGRAM(s) Oral at bedtime  tamsulosin Oral Tab/Cap - Peds 0.4 milliGRAM(s) Oral at bedtime    MEDICATIONS  (PRN):      Vital Signs Last 24 Hrs  T(C): 36.7 (07 Jun 2018 04:02), Max: 36.7 (06 Jun 2018 20:43)  T(F): 98 (07 Jun 2018 04:02), Max: 98.1 (06 Jun 2018 20:43)  HR: 67 (07 Jun 2018 04:02) (64 - 67)  BP: 165/83 (07 Jun 2018 04:02) (112/61 - 165/83)  BP(mean): --  RR: 18 (07 Jun 2018 04:02) (18 - 18)  SpO2: 98% (07 Jun 2018 04:02) (98% - 99%)  CAPILLARY BLOOD GLUCOSE        I&O's Summary    06 Jun 2018 07:01  -  07 Jun 2018 07:00  --------------------------------------------------------  IN: 960 mL / OUT: 650 mL / NET: 310 mL        PHYSICAL EXAM:  HEAD:  Atraumatic, Normocephalic  NECK: Supple, No   JVD  CHEST/LUNG:   no     rales,     no,    rhonchi  HEART: Regular rate and rhythm;         murmur  ABDOMEN: Soft, Nontender, ;   EXTREMITIES:        edema  NEUROLOGY:  alert    LABS:                        11.8   5.92  )-----------( 286      ( 06 Jun 2018 08:21 )             36.0     06-06    139  |  101  |  26<H>  ----------------------------<  95  3.9   |  27  |  1.11    Ca    9.2      06 Jun 2018 06:05                      Thyroid Stimulating Hormone, Serum: 4.14 uIU/mL (04-20 @ 08:14)          Consultant(s) Notes Reviewed:      Care Discussed with Consultants/Other Providers:

## 2018-06-07 NOTE — PROGRESS NOTE ADULT - ASSESSMENT
pt  with htn, tia,  afib, ppm, cardiomyopathy      ca  bladder  s/p  turbt  last  month,  now   with gross hematuria  urology  f/p   hold  a/c  urology/  dr scott  on amio/ lasix/ asa/ keppra/ beta  blocker  small  cell carcinoma bladder,     ct chest , no malignancy  per  oncology, pt needs adjuvant chemo, based on pathology, however die to age  there  is  concern  pt is  aware of  his dx,   oncology   spojke  with  pt/  family  defer restarting A/C  TO  CARD DR CANO

## 2018-06-08 VITALS
DIASTOLIC BLOOD PRESSURE: 54 MMHG | RESPIRATION RATE: 18 BRPM | OXYGEN SATURATION: 100 % | SYSTOLIC BLOOD PRESSURE: 161 MMHG | TEMPERATURE: 98 F | HEART RATE: 80 BPM

## 2018-06-08 PROCEDURE — 85014 HEMATOCRIT: CPT

## 2018-06-08 PROCEDURE — 83605 ASSAY OF LACTIC ACID: CPT

## 2018-06-08 PROCEDURE — 80048 BASIC METABOLIC PNL TOTAL CA: CPT

## 2018-06-08 PROCEDURE — 99285 EMERGENCY DEPT VISIT HI MDM: CPT

## 2018-06-08 PROCEDURE — 97161 PT EVAL LOW COMPLEX 20 MIN: CPT

## 2018-06-08 PROCEDURE — 81001 URINALYSIS AUTO W/SCOPE: CPT

## 2018-06-08 PROCEDURE — 82435 ASSAY OF BLOOD CHLORIDE: CPT

## 2018-06-08 PROCEDURE — 80053 COMPREHEN METABOLIC PANEL: CPT

## 2018-06-08 PROCEDURE — 82803 BLOOD GASES ANY COMBINATION: CPT

## 2018-06-08 PROCEDURE — 83735 ASSAY OF MAGNESIUM: CPT

## 2018-06-08 PROCEDURE — 82947 ASSAY GLUCOSE BLOOD QUANT: CPT

## 2018-06-08 PROCEDURE — 84295 ASSAY OF SERUM SODIUM: CPT

## 2018-06-08 PROCEDURE — 85027 COMPLETE CBC AUTOMATED: CPT

## 2018-06-08 PROCEDURE — 85610 PROTHROMBIN TIME: CPT

## 2018-06-08 PROCEDURE — 84550 ASSAY OF BLOOD/URIC ACID: CPT

## 2018-06-08 PROCEDURE — 82330 ASSAY OF CALCIUM: CPT

## 2018-06-08 PROCEDURE — 85730 THROMBOPLASTIN TIME PARTIAL: CPT

## 2018-06-08 PROCEDURE — 84132 ASSAY OF SERUM POTASSIUM: CPT

## 2018-06-08 RX ADMIN — LEVETIRACETAM 500 MILLIGRAM(S): 250 TABLET, FILM COATED ORAL at 06:02

## 2018-06-08 RX ADMIN — Medication 20 MILLIGRAM(S): at 06:05

## 2018-06-08 RX ADMIN — Medication 100 MILLIGRAM(S): at 12:45

## 2018-06-08 RX ADMIN — Medication 100 MILLIGRAM(S): at 06:02

## 2018-06-08 RX ADMIN — Medication 20 MILLIGRAM(S): at 06:02

## 2018-06-08 RX ADMIN — Medication 0.12 MILLIGRAM(S): at 06:02

## 2018-06-08 RX ADMIN — GABAPENTIN 100 MILLIGRAM(S): 400 CAPSULE ORAL at 06:05

## 2018-06-08 NOTE — PROGRESS NOTE ADULT - ASSESSMENT
pt  with htn, tia,  afib, ppm, cardiomyopathy      ca  bladder  s/p  turbt  last  month,  now   with gross hematuria  urology  f/p   hold  a/c  urology/  dr scott  on amio/ lasix/ asa/ keppra/ beta  blocker  small  cell carcinoma bladder,     ct chest , no malignancy  per  oncology, pt needs adjuvant chemo, based on pathology, however die to age  there  is  concern  pt is  aware of  his dx,   oncology   spojke  with  pt/  family  defer restarting A/C to  card  now  with leg  swelling/ pain, clinical  exam not  c/w  gout,  and  with normal uric acid level   needs  doppler s to  r/o  dvt,  discussed  with floor np

## 2018-06-08 NOTE — PROGRESS NOTE ADULT - SUBJECTIVE AND OBJECTIVE BOX
- Patient seen and examined.  - In summary, patient is a 89 year old man who presented with hematuria. (01 Jun 2018 17:36)  - Today, patient is without complaints.         *****MEDICATIONS:    MEDICATIONS  (STANDING):  digoxin     Tablet 0.125 milliGRAM(s) Oral daily  docusate sodium 100 milliGRAM(s) Oral three times a day  furosemide    Tablet 20 milliGRAM(s) Oral daily  gabapentin 100 milliGRAM(s) Oral two times a day  levETIRAcetam 500 milliGRAM(s) Oral two times a day  metoprolol succinate  milliGRAM(s) Oral daily  mirtazapine 15 milliGRAM(s) Oral at bedtime  predniSONE   Tablet 20 milliGRAM(s) Oral daily  senna 2 Tablet(s) Oral at bedtime  simvastatin 20 milliGRAM(s) Oral at bedtime  tamsulosin Oral Tab/Cap - Peds 0.4 milliGRAM(s) Oral at bedtime    MEDICATIONS  (PRN):               ***** REVIEW OF SYSTEM:  GEN: no fever, no chills, no pain  RESP: no SOB, no cough, no sputum  CVS: no chest pain, no palpitations, no edema  GI: no abdominal pain, no nausea, no vomiting, no constipation, no diarrhea  : no dysuria, no frequency  NEURO: no headache, no dizziness  PSYCH: no depression, not anxious  Derm : no itching, no rash         ***** VITAL SIGNS:    T(F): 97.8 (06-08-18 @ 04:12), Max: 97.8 (06-08-18 @ 04:12)  HR: 68 (06-08-18 @ 04:12) (68 - 68)  BP: 151/85 (06-08-18 @ 04:12) (117/62 - 151/85)  RR: 18 (06-08-18 @ 04:12) (18 - 20)  SpO2: 98% (06-08-18 @ 04:12) (98% - 98%)  Wt(kg): --  ,   I&O's Summary    07 Jun 2018 07:01  -  08 Jun 2018 07:00  --------------------------------------------------------  IN: 1200 mL / OUT: 900 mL / NET: 300 mL    08 Jun 2018 07:01  -  08 Jun 2018 11:25  --------------------------------------------------------  IN: 240 mL / OUT: 550 mL / NET: -310 mL                 *****PHYSICAL EXAM:  GEN: A&O X 3 , NAD , comfortable  HEENT: NCAT, EOMI, MMM, no icterus  NECK: Supple, No JVD  CVS: S1S2 , regular , No M/R/G appreciated  PULM: CTA B/L,  no W/R/R appreciated  ABD.: soft. non tender, non distended,  bowel sounds present  Extrem: intact pulses , no edema noted  Derm: No rash or ecchymosis noted  PSYCH: normal mood, no depression, not anxious         *****LAB AND IMAGING:                                   [All pertinent recent Imaging/Reports reviewed]         *****A S S E S S M E N T   A N D   P L A N :  89M with hematuria s/p recent TURBT for squamous cell ca, a.fib with rvr  tele flutter, V paced   cont current cardiac meds  amio held per eps  urology f/u  AC on hold  hematuria improved  H/H stable  resume AC when clear per   onc f/u out pt  wrist/ankle/knee pain improved with steroids, short course  cont neuontin  DCP dexter    __________________________  DALILA Robertson D.O.

## 2018-06-08 NOTE — PROGRESS NOTE ADULT - SUBJECTIVE AND OBJECTIVE BOX
c/o  foot/ leg pain  able to  ambulate  REVIEW OF SYSTEMS:  CONSTITUTIONAL: No weakness,  no   fevers      RESPIRATORY:  No    shortness of breath  , no  wheezing  CARDIOVASCULAR: No chest pain,   no  palpitations, no  cough  GASTROINTESTINAL: No abdominal  pain, no  vomiting, no  diarrhea  NEUROLOGICAL: No  focal  weakness    MEDICATIONS  (STANDING):  digoxin     Tablet 0.125 milliGRAM(s) Oral daily  docusate sodium 100 milliGRAM(s) Oral three times a day  furosemide    Tablet 20 milliGRAM(s) Oral daily  gabapentin 100 milliGRAM(s) Oral two times a day  levETIRAcetam 500 milliGRAM(s) Oral two times a day  metoprolol succinate  milliGRAM(s) Oral daily  mirtazapine 15 milliGRAM(s) Oral at bedtime  predniSONE   Tablet 20 milliGRAM(s) Oral daily  senna 2 Tablet(s) Oral at bedtime  simvastatin 20 milliGRAM(s) Oral at bedtime  tamsulosin Oral Tab/Cap - Peds 0.4 milliGRAM(s) Oral at bedtime    MEDICATIONS  (PRN):      Vital Signs Last 24 Hrs  T(C): 36.6 (08 Jun 2018 04:12), Max: 36.6 (08 Jun 2018 04:12)  T(F): 97.8 (08 Jun 2018 04:12), Max: 97.8 (08 Jun 2018 04:12)  HR: 68 (08 Jun 2018 04:12) (68 - 68)  BP: 151/85 (08 Jun 2018 04:12) (117/62 - 151/85)  BP(mean): --  RR: 18 (08 Jun 2018 04:12) (18 - 20)  SpO2: 98% (08 Jun 2018 04:12) (98% - 98%)  CAPILLARY BLOOD GLUCOSE        I&O's Summary    07 Jun 2018 07:01  -  08 Jun 2018 07:00  --------------------------------------------------------  IN: 1200 mL / OUT: 900 mL / NET: 300 mL    08 Jun 2018 07:01  -  08 Jun 2018 11:50  --------------------------------------------------------  IN: 240 mL / OUT: 550 mL / NET: -310 mL        PHYSICAL EXAM:  HEAD:  Atraumatic, Normocephalic  NECK: Supple, No   JVD  CHEST/LUNG:   no     rales,     no,    rhonchi  HEART: Regular rate and rhythm;         murmur  ABDOMEN: Soft, Nontender, ;   EXTREMITIES:        edema, dorsum of  feet,  left leg bigger tahn right   no jopint swelling/ redness  NEUROLOGY:  alert    LABS:                          Thyroid Stimulating Hormone, Serum: 4.14 uIU/mL (04-20 @ 08:14)          Consultant(s) Notes Reviewed:      Care Discussed with Consultants/Other Providers:

## 2018-06-08 NOTE — PROGRESS NOTE ADULT - PROVIDER SPECIALTY LIST ADULT
Cardiology
Heme/Onc
Internal Medicine
Urology
Urology
Cardiology
Heme/Onc
Internal Medicine

## 2018-06-11 ENCOUNTER — OUTPATIENT (OUTPATIENT)
Dept: OUTPATIENT SERVICES | Facility: HOSPITAL | Age: 83
LOS: 1 days | End: 2018-06-11
Payer: MEDICARE

## 2018-06-11 DIAGNOSIS — I82.409 ACUTE EMBOLISM AND THROMBOSIS OF UNSPECIFIED DEEP VEINS OF UNSPECIFIED LOWER EXTREMITY: ICD-10-CM

## 2018-06-11 DIAGNOSIS — Z85.46 PERSONAL HISTORY OF MALIGNANT NEOPLASM OF PROSTATE: Chronic | ICD-10-CM

## 2018-06-11 PROCEDURE — 93922 UPR/L XTREMITY ART 2 LEVELS: CPT

## 2018-06-11 PROCEDURE — 93970 EXTREMITY STUDY: CPT

## 2018-06-11 PROCEDURE — 93970 EXTREMITY STUDY: CPT | Mod: 26

## 2018-09-26 ENCOUNTER — INPATIENT (INPATIENT)
Facility: HOSPITAL | Age: 83
LOS: 6 days | Discharge: HOME | DRG: 868 | End: 2018-10-03
Attending: INTERNAL MEDICINE | Admitting: INTERNAL MEDICINE
Payer: MEDICARE

## 2018-09-26 VITALS
DIASTOLIC BLOOD PRESSURE: 74 MMHG | HEART RATE: 76 BPM | OXYGEN SATURATION: 96 % | SYSTOLIC BLOOD PRESSURE: 180 MMHG | RESPIRATION RATE: 18 BRPM

## 2018-09-26 DIAGNOSIS — R50.9 FEVER, UNSPECIFIED: ICD-10-CM

## 2018-09-26 DIAGNOSIS — Z85.46 PERSONAL HISTORY OF MALIGNANT NEOPLASM OF PROSTATE: Chronic | ICD-10-CM

## 2018-09-26 PROBLEM — I50.9 HEART FAILURE, UNSPECIFIED: Chronic | Status: ACTIVE | Noted: 2017-10-11

## 2018-09-26 LAB
ALBUMIN SERPL ELPH-MCNC: 3.9 G/DL — SIGNIFICANT CHANGE UP (ref 3.3–5)
ALP SERPL-CCNC: 94 U/L — SIGNIFICANT CHANGE UP (ref 40–120)
ALT FLD-CCNC: 43 U/L — SIGNIFICANT CHANGE UP (ref 10–45)
ANION GAP SERPL CALC-SCNC: 10 MMOL/L — SIGNIFICANT CHANGE UP (ref 5–17)
APPEARANCE UR: CLEAR — SIGNIFICANT CHANGE UP
APTT BLD: 31.3 SEC — SIGNIFICANT CHANGE UP (ref 27.5–37.4)
AST SERPL-CCNC: 84 U/L — HIGH (ref 10–40)
BACTERIA # UR AUTO: NEGATIVE — SIGNIFICANT CHANGE UP
BASE EXCESS BLDV CALC-SCNC: 3.8 MMOL/L — HIGH (ref -2–2)
BASOPHILS # BLD AUTO: 0.1 K/UL — SIGNIFICANT CHANGE UP (ref 0–0.2)
BASOPHILS NFR BLD AUTO: 0.7 % — SIGNIFICANT CHANGE UP (ref 0–2)
BILIRUB SERPL-MCNC: 0.4 MG/DL — SIGNIFICANT CHANGE UP (ref 0.2–1.2)
BILIRUB UR-MCNC: NEGATIVE — SIGNIFICANT CHANGE UP
BUN SERPL-MCNC: 13 MG/DL — SIGNIFICANT CHANGE UP (ref 7–23)
CA-I SERPL-SCNC: 1.16 MMOL/L — SIGNIFICANT CHANGE UP (ref 1.12–1.3)
CALCIUM SERPL-MCNC: 9.9 MG/DL — SIGNIFICANT CHANGE UP (ref 8.4–10.5)
CHLORIDE BLDV-SCNC: 106 MMOL/L — SIGNIFICANT CHANGE UP (ref 96–108)
CHLORIDE SERPL-SCNC: 100 MMOL/L — SIGNIFICANT CHANGE UP (ref 96–108)
CO2 BLDV-SCNC: 30 MMOL/L — SIGNIFICANT CHANGE UP (ref 22–30)
CO2 SERPL-SCNC: 27 MMOL/L — SIGNIFICANT CHANGE UP (ref 22–31)
COLOR SPEC: YELLOW — SIGNIFICANT CHANGE UP
CREAT SERPL-MCNC: 1.08 MG/DL — SIGNIFICANT CHANGE UP (ref 0.5–1.3)
DIFF PNL FLD: ABNORMAL
EOSINOPHIL # BLD AUTO: 0.1 K/UL — SIGNIFICANT CHANGE UP (ref 0–0.5)
EOSINOPHIL NFR BLD AUTO: 0.9 % — SIGNIFICANT CHANGE UP (ref 0–6)
EPI CELLS # UR: 4 /HPF — SIGNIFICANT CHANGE UP
GAS PNL BLDV: 133 MMOL/L — LOW (ref 136–145)
GAS PNL BLDV: SIGNIFICANT CHANGE UP
GLUCOSE BLDV-MCNC: 96 MG/DL — SIGNIFICANT CHANGE UP (ref 70–99)
GLUCOSE SERPL-MCNC: 96 MG/DL — SIGNIFICANT CHANGE UP (ref 70–99)
GLUCOSE UR QL: NEGATIVE — SIGNIFICANT CHANGE UP
HCO3 BLDV-SCNC: 29 MMOL/L — SIGNIFICANT CHANGE UP (ref 21–29)
HCT VFR BLD CALC: 35 % — LOW (ref 39–50)
HCT VFR BLD CALC: 36.2 % — LOW (ref 39–50)
HCT VFR BLDA CALC: 37 % — LOW (ref 39–50)
HGB BLD CALC-MCNC: 11.9 G/DL — LOW (ref 13–17)
HGB BLD-MCNC: 12.2 G/DL — LOW (ref 13–17)
HGB BLD-MCNC: 12.3 G/DL — LOW (ref 13–17)
HYALINE CASTS # UR AUTO: 8 /LPF — HIGH (ref 0–2)
INR BLD: 1.31 RATIO — HIGH (ref 0.88–1.16)
KETONES UR-MCNC: SIGNIFICANT CHANGE UP
LACTATE BLDV-MCNC: 1.9 MMOL/L — SIGNIFICANT CHANGE UP (ref 0.7–2)
LEUKOCYTE ESTERASE UR-ACNC: NEGATIVE — SIGNIFICANT CHANGE UP
LYMPHOCYTES # BLD AUTO: 1.4 K/UL — SIGNIFICANT CHANGE UP (ref 1–3.3)
LYMPHOCYTES # BLD AUTO: 18.8 % — SIGNIFICANT CHANGE UP (ref 13–44)
MCHC RBC-ENTMCNC: 29.1 PG — SIGNIFICANT CHANGE UP (ref 27–34)
MCHC RBC-ENTMCNC: 30.3 PG — SIGNIFICANT CHANGE UP (ref 27–34)
MCHC RBC-ENTMCNC: 33.8 GM/DL — SIGNIFICANT CHANGE UP (ref 32–36)
MCHC RBC-ENTMCNC: 35.3 GM/DL — SIGNIFICANT CHANGE UP (ref 32–36)
MCV RBC AUTO: 86 FL — SIGNIFICANT CHANGE UP (ref 80–100)
MCV RBC AUTO: 86.1 FL — SIGNIFICANT CHANGE UP (ref 80–100)
MONOCYTES # BLD AUTO: 1.2 K/UL — HIGH (ref 0–0.9)
MONOCYTES NFR BLD AUTO: 16.2 % — HIGH (ref 2–14)
NEUTROPHILS # BLD AUTO: 4.8 K/UL — SIGNIFICANT CHANGE UP (ref 1.8–7.4)
NEUTROPHILS NFR BLD AUTO: 63.4 % — SIGNIFICANT CHANGE UP (ref 43–77)
NITRITE UR-MCNC: NEGATIVE — SIGNIFICANT CHANGE UP
PCO2 BLDV: 49 MMHG — SIGNIFICANT CHANGE UP (ref 35–50)
PH BLDV: 7.39 — SIGNIFICANT CHANGE UP (ref 7.35–7.45)
PH UR: 6.5 — SIGNIFICANT CHANGE UP (ref 5–8)
PLATELET # BLD AUTO: 148 K/UL — LOW (ref 150–400)
PLATELET # BLD AUTO: 169 K/UL — SIGNIFICANT CHANGE UP (ref 150–400)
PO2 BLDV: 24 MMHG — LOW (ref 25–45)
POTASSIUM BLDV-SCNC: 3.4 MMOL/L — LOW (ref 3.5–5.3)
POTASSIUM SERPL-MCNC: 3.7 MMOL/L — SIGNIFICANT CHANGE UP (ref 3.5–5.3)
POTASSIUM SERPL-SCNC: 3.7 MMOL/L — SIGNIFICANT CHANGE UP (ref 3.5–5.3)
PROT SERPL-MCNC: 7.3 G/DL — SIGNIFICANT CHANGE UP (ref 6–8.3)
PROT UR-MCNC: ABNORMAL
PROTHROM AB SERPL-ACNC: 14.2 SEC — HIGH (ref 9.8–12.7)
RAPID RVP RESULT: SIGNIFICANT CHANGE UP
RBC # BLD: 4.07 M/UL — LOW (ref 4.2–5.8)
RBC # BLD: 4.21 M/UL — SIGNIFICANT CHANGE UP (ref 4.2–5.8)
RBC # FLD: 13.3 % — SIGNIFICANT CHANGE UP (ref 10.3–14.5)
RBC # FLD: 13.3 % — SIGNIFICANT CHANGE UP (ref 10.3–14.5)
RBC CASTS # UR COMP ASSIST: 12 /HPF — HIGH (ref 0–4)
SAO2 % BLDV: 31 % — LOW (ref 67–88)
SODIUM SERPL-SCNC: 137 MMOL/L — SIGNIFICANT CHANGE UP (ref 135–145)
SP GR SPEC: 1.02 — SIGNIFICANT CHANGE UP (ref 1.01–1.02)
UROBILINOGEN FLD QL: NEGATIVE — SIGNIFICANT CHANGE UP
WBC # BLD: 7.6 K/UL — SIGNIFICANT CHANGE UP (ref 3.8–10.5)
WBC # BLD: 7.8 K/UL — SIGNIFICANT CHANGE UP (ref 3.8–10.5)
WBC # FLD AUTO: 7.6 K/UL — SIGNIFICANT CHANGE UP (ref 3.8–10.5)
WBC # FLD AUTO: 7.8 K/UL — SIGNIFICANT CHANGE UP (ref 3.8–10.5)
WBC UR QL: 4 /HPF — SIGNIFICANT CHANGE UP (ref 0–5)

## 2018-09-26 PROCEDURE — 70450 CT HEAD/BRAIN W/O DYE: CPT | Mod: 26

## 2018-09-26 PROCEDURE — 71045 X-RAY EXAM CHEST 1 VIEW: CPT | Mod: 26

## 2018-09-26 PROCEDURE — 71250 CT THORAX DX C-: CPT | Mod: 26

## 2018-09-26 PROCEDURE — 99285 EMERGENCY DEPT VISIT HI MDM: CPT | Mod: 25

## 2018-09-26 PROCEDURE — 93010 ELECTROCARDIOGRAM REPORT: CPT

## 2018-09-26 RX ORDER — ASPIRIN/CALCIUM CARB/MAGNESIUM 324 MG
81 TABLET ORAL DAILY
Qty: 0 | Refills: 0 | Status: DISCONTINUED | OUTPATIENT
Start: 2018-09-26 | End: 2018-10-03

## 2018-09-26 RX ORDER — SODIUM CHLORIDE 9 MG/ML
1000 INJECTION INTRAMUSCULAR; INTRAVENOUS; SUBCUTANEOUS
Qty: 0 | Refills: 0 | Status: DISCONTINUED | OUTPATIENT
Start: 2018-09-26 | End: 2018-10-03

## 2018-09-26 RX ORDER — ENOXAPARIN SODIUM 100 MG/ML
40 INJECTION SUBCUTANEOUS DAILY
Qty: 0 | Refills: 0 | Status: DISCONTINUED | OUTPATIENT
Start: 2018-09-26 | End: 2018-10-03

## 2018-09-26 RX ORDER — DIGOXIN 250 MCG
0.12 TABLET ORAL DAILY
Qty: 0 | Refills: 0 | Status: DISCONTINUED | OUTPATIENT
Start: 2018-09-26 | End: 2018-10-03

## 2018-09-26 RX ORDER — SIMVASTATIN 20 MG/1
20 TABLET, FILM COATED ORAL AT BEDTIME
Qty: 0 | Refills: 0 | Status: DISCONTINUED | OUTPATIENT
Start: 2018-09-26 | End: 2018-10-03

## 2018-09-26 RX ORDER — INFLUENZA VIRUS VACCINE 15; 15; 15; 15 UG/.5ML; UG/.5ML; UG/.5ML; UG/.5ML
0.5 SUSPENSION INTRAMUSCULAR ONCE
Qty: 0 | Refills: 0 | Status: COMPLETED | OUTPATIENT
Start: 2018-09-26 | End: 2018-09-26

## 2018-09-26 RX ORDER — ACETAMINOPHEN 500 MG
975 TABLET ORAL ONCE
Qty: 0 | Refills: 0 | Status: COMPLETED | OUTPATIENT
Start: 2018-09-26 | End: 2018-09-26

## 2018-09-26 RX ORDER — LEVETIRACETAM 250 MG/1
500 TABLET, FILM COATED ORAL
Qty: 0 | Refills: 0 | Status: DISCONTINUED | OUTPATIENT
Start: 2018-09-26 | End: 2018-10-03

## 2018-09-26 RX ORDER — SODIUM CHLORIDE 9 MG/ML
1000 INJECTION INTRAMUSCULAR; INTRAVENOUS; SUBCUTANEOUS ONCE
Qty: 0 | Refills: 0 | Status: COMPLETED | OUTPATIENT
Start: 2018-09-26 | End: 2018-09-26

## 2018-09-26 RX ORDER — TAMSULOSIN HYDROCHLORIDE 0.4 MG/1
0.4 CAPSULE ORAL AT BEDTIME
Qty: 0 | Refills: 0 | Status: DISCONTINUED | OUTPATIENT
Start: 2018-09-26 | End: 2018-10-03

## 2018-09-26 RX ORDER — METOPROLOL TARTRATE 50 MG
100 TABLET ORAL
Qty: 0 | Refills: 0 | Status: DISCONTINUED | OUTPATIENT
Start: 2018-09-26 | End: 2018-10-03

## 2018-09-26 RX ADMIN — Medication 81 MILLIGRAM(S): at 18:46

## 2018-09-26 RX ADMIN — Medication 975 MILLIGRAM(S): at 13:15

## 2018-09-26 RX ADMIN — Medication 0.12 MILLIGRAM(S): at 18:46

## 2018-09-26 RX ADMIN — Medication 975 MILLIGRAM(S): at 14:00

## 2018-09-26 RX ADMIN — Medication 100 MILLIGRAM(S): at 18:46

## 2018-09-26 RX ADMIN — TAMSULOSIN HYDROCHLORIDE 0.4 MILLIGRAM(S): 0.4 CAPSULE ORAL at 21:09

## 2018-09-26 RX ADMIN — LEVETIRACETAM 500 MILLIGRAM(S): 250 TABLET, FILM COATED ORAL at 18:46

## 2018-09-26 RX ADMIN — SODIUM CHLORIDE 1000 MILLILITER(S): 9 INJECTION INTRAMUSCULAR; INTRAVENOUS; SUBCUTANEOUS at 14:15

## 2018-09-26 RX ADMIN — SODIUM CHLORIDE 50 MILLILITER(S): 9 INJECTION INTRAMUSCULAR; INTRAVENOUS; SUBCUTANEOUS at 21:09

## 2018-09-26 RX ADMIN — SODIUM CHLORIDE 1000 MILLILITER(S): 9 INJECTION INTRAMUSCULAR; INTRAVENOUS; SUBCUTANEOUS at 13:15

## 2018-09-26 RX ADMIN — SIMVASTATIN 20 MILLIGRAM(S): 20 TABLET, FILM COATED ORAL at 21:09

## 2018-09-26 NOTE — ED PROVIDER NOTE - PROGRESS NOTE DETAILS
Dr. Gutierrez:  Will hold off abx at present as no elevated WBC, no elevated lactate, UA WNL, no clear source of infection. Pt admitted to Dr Trejo. CTs of head and chest pending.

## 2018-09-26 NOTE — ED ADULT NURSE NOTE - OBJECTIVE STATEMENT
1215 89 yr old BM brought to ER via ambulance on stretcher for further eval and tx of weakness, tactile fever, difficulty walking. diarrhea x 2 days. Denies chest pain, palp, cough or congestion. PMH prostate cancer many yrs ago with bladder cancer diagnosed recently. No chemo yet 1215 89 yr old BM brought to ER via ambulance on stretcher for further eval and tx of weakness, tactile fever, difficulty walking. diarrhea x 2 days. Denies chest pain, palp, cough or congestion. PMH prostate cancer many yrs ago with bladder cancer diagnosed recently. No chemo yet. A&Ox4. MAEx4. Bilateral breath sounds. Lungs clear. abd soft. MAEx4. wearing diaper. Lips and nailbeds pink. Skin hot and dry.

## 2018-09-26 NOTE — ED PROVIDER NOTE - ATTENDING CONTRIBUTION TO CARE
89y M hx of prostate CA sp seeds decades ago, now with new diagnosis of bladder CA sp multiple sessions RT presenting today with several days of fever, chills and generalized weakness. Pt denies any cough, nasal congestion, HA, neck stiffness, sore throat, abd pain, NV. Had couple episodes fo loose stools. Recently visited friend in hospital. No travel.   Gen: WNWD NAD  HEENT: NCAT PERRL arcus senilus BL EOMI normal pharynx  Neck: supple  CV: RRR, no murmur  Lung: Dec BS BL L>R  Abd: +BS soft NTND  Ext: wwp, palp pulses, FROMx4, no cce  Neuro: Awake alert CN grossly intact, sensation intact, motor 5/5 throughout  AP: 89y M hx of prostate CA sp seeds decades ago, now with new diagnosis of bladder CA sp multiple sessions RT presenting today with several days of fever, chills and generalized weakness. Febrile on arrival. Will send septic ORTEGA. Labs, cultures, CXR, RVP, UA, Ucx.

## 2018-09-26 NOTE — ED ADULT NURSE REASSESSMENT NOTE - NS ED NURSE REASSESS COMMENT FT1
pt straight cathed under sterile technique with 2 RNs at bedside, pt tolerated well. 50mL of yeny urine drained. U/A and culture sent to lab.

## 2018-09-26 NOTE — CONSULT NOTE ADULT - ASSESSMENT
pt  with htn, tia,  afib, ppm, cardiomyopathy  , tia,. hld    ca  bladder /  small  cell cancer/    s/p  turbt  s/p  gross hematuria  urology/  dr scott  on dig// asa/keppra/ beta  blocker  now  with  weakness/ dehydration   iv fluids  rvp   dvt ppx pt  with htn, tia,  afib, ppm, cardiomyopathy  , tia,. hld    ca  bladder /  small  cell cancer/    s/p  turbt  s/p  gross hematuria  urology/  dr scott  on dig// asa/keppra/ beta  blocker  now  with  weakness/ dehydration   iv fluids  ct  chest. no pna, ct head, no new  cva  rvp   dvt ppx

## 2018-09-26 NOTE — ED PROVIDER NOTE - OBJECTIVE STATEMENT
89 y/o male PMH of TIA, seizure disorder, A.Fib (s/p pacemaker placement) HTN, HLD, systolic HF, bladder ca, s/p TURBT on 05/16/2018 and recent radiation therapy who presents to the ED for generalized weakness. patient reports feeling weak and fatigued for 4 days. a few days of loose stools, no recent abx. last hospitalization is in july. tmax at home was 99. no cough or uri symptoms. no abd pain, n/v. no dysuria, but does have sensation of incomplete emptying. has not been eating or drinking for days and has been too weak to stand.

## 2018-09-26 NOTE — H&P ADULT - PMH
BPH (benign prostatic hypertrophy)    Cancer  prostate/bladder cancer  HF (heart failure)    HTN (Hypertension)    Hyperlipemia    Pacemaker    PAF (paroxysmal atrial fibrillation)    Prostate Ca  s/p seed implant  Prostate cancer    Seizure    TIA (Transient Ischemic Attack)

## 2018-09-26 NOTE — ED ADULT NURSE NOTE - NSIMPLEMENTINTERV_GEN_ALL_ED
Implemented All Fall with Harm Risk Interventions:  [place] to call system. Call bell, personal items and telephone within reach. Instruct patient to call for assistance. Room bathroom lighting operational. Non-slip footwear when patient is off stretcher. Physically safe environment: no spills, clutter or unnecessary equipment. Stretcher in lowest position, wheels locked, appropriate side rails in place. Provide visual cue, wrist band, yellow gown, etc. Monitor gait and stability. Monitor for mental status changes and reorient to person, place, and time. Review medications for side effects contributing to fall risk. Reinforce activity limits and safety measures with patient and family. Provide visual clues: red socks.

## 2018-09-26 NOTE — H&P ADULT - ASSESSMENT
pt with sig hx of paf,htn,cva,seizure disorder with change of mental status and fever for 2 days with decrease po intake.  check head ct  r/o sepsis/seizure disorder  no AC due hx of hematuria sec to bladder ca s/p recent radiation therapy  neuro eval  will hold abx unless there isource pt non toxic  dvt prophylaxis

## 2018-09-26 NOTE — H&P ADULT - HISTORY OF PRESENT ILLNESS
CHIEF COMPLAINT:Patient is a 89y old  Male who presents with a chief complaint of generalized weakness/fever.    HPI Objective Statement: 89 y/o male PMH of TIA, seizure disorder, A.Fib (s/p pacemaker placement) HTN, HLD, systolic HF, bladder ca, s/p TURBT on 05/16/2018 and recent radiation therapy who presents to the ED for generalized weakness. patient reports feeling weak and fatigued for 4 days. a few days of loose stools, no recent abx. last hospitalization is in july. tmax at home was 99. no cough or uri symptoms. no abd pain, n/v. no dysuria, but does have sensation of incomplete emptying. has not been eating or drinking for days and has been too weak to stand.        PAST MEDICAL & SURGICAL HISTORY:  Cancer: prostate/bladder cancer  Pacemaker  HF (heart failure)  Seizure  Prostate cancer  BPH (benign prostatic hypertrophy)  PAF (paroxysmal atrial fibrillation)  TIA (Transient Ischemic Attack)  Prostate Ca: s/p seed implant  Hyperlipemia  HTN (Hypertension)  H/O prostate cancer: seed implant  No Past Surgical History      MEDICATIONS  (STANDING):    MEDICATIONS  (PRN):      FAMILY HISTORY:  No pertinent family history in first degree relatives      SOCIAL HISTORY:    [ ] Non-smoker  [ ] Smoker  [ ] Alcohol    Allergies    ACE inhibitors (Other)    Intolerances    	    REVIEW OF SYSTEMS:  CONSTITUTIONAL: No fever, weight loss, or fatigue  EYES: No eye pain, visual disturbances, or discharge  ENT:  No difficulty hearing, tinnitus, vertigo; No sinus or throat pain  NECK: No pain or stiffness  RESPIRATORY: No cough, wheezing, chills or hemoptysis; + Shortness of Breath  CARDIOVASCULAR: No chest pain, palpitations, passing out, dizziness, or leg swelling  GASTROINTESTINAL: No abdominal or epigastric pain. No nausea, vomiting, or hematemesis; No diarrhea or constipation. No melena or hematochezia.  GENITOURINARY: No dysuria, frequency, hematuria, or incontinence  NEUROLOGICAL: No headaches, memory loss, loss of strength, numbness, or tremors, mildly confuse  SKIN: No itching, burning, rashes, or lesions   LYMPH Nodes: No enlarged glands  ENDOCRINE: No heat or cold intolerance; No hair loss  MUSCULOSKELETAL: No joint pain or swelling; No muscle, back, or extremity pain  PSYCHIATRIC: No depression, anxiety, mood swings, or difficulty sleeping  HEME/LYMPH: No easy bruising, or bleeding gums  ALLERGY AND IMMUNOLOGIC: No hives or eczema	    [ ] All others negative	  [ ] Unable to obtain    PHYSICAL EXAM:  T(C): 37 (09-26-18 @ 15:27), Max: 38.6 (09-26-18 @ 13:20)  HR: 61 (09-26-18 @ 15:27) (61 - 76)  BP: 152/59 (09-26-18 @ 15:27) (152/59 - 190/76)  RR: 23 (09-26-18 @ 15:27) (18 - 24)  SpO2: 98% (09-26-18 @ 15:27) (96% - 100%)  Wt(kg): --  I&O's Summary      Appearance: Normal	  HEENT:   Normal oral mucosa, PERRL, EOMI	  Lymphatic: No lymphadenopathy  Cardiovascular: Normal S1 S2, No JVD, + murmurs, No edema  Respiratory: Lungs clear to auscultation	  Psychiatry: A & O x 3, Mood & affect appropriate  Gastrointestinal:  Soft, Non-tender, + BS	  Skin: No rashes, No ecchymoses, No cyanosis	  Neurologic: Non-focal  Extremities: Normal range of motion, No clubbing, cyanosis or edema  Vascular: Peripheral pulses palpable 2+ bilaterally    TELEMETRY: 	    ECG:  	  RADIOLOGY:  OTHER: 	  	  LABS:	 	    CARDIAC MARKERS:                              12.3   7.6   )-----------( 169      ( 26 Sep 2018 13:17 )             35.0     09-26    137  |  100  |  13  ----------------------------<  96  3.7   |  27  |  1.08    Ca    9.9      26 Sep 2018 13:17    TPro  7.3  /  Alb  3.9  /  TBili  0.4  /  DBili  x   /  AST  84<H>  /  ALT  43  /  AlkPhos  94  09-26    proBNP:   Lipid Profile:   HgA1c:   TSH:   PT/INR - ( 26 Sep 2018 13:17 )   PT: 14.2 sec;   INR: 1.31 ratio         PTT - ( 26 Sep 2018 13:17 )  PTT:31.3 sec    PREVIOUS DIAGNOSTIC TESTING:    < from: Transthoracic Echocardiogram (12.16.13 @ 13:44) >  1. Mild mitral annular calcification, otherwise normal  mitral valve. Mild mitral regurgitation.  2. Normal trileaflet aortic valve. Mild aortic  regurgitation.  3. Mild concentric left ventricular hypertrophy.  4. Hyperdynamic left ventricle.  Ejection fraction is 75%.  5. Moderate diastolic dysfunction (Stage II).  6. Estimated right ventricular systolic pressure equals 56  mm Hg, assuming right atrial pressure equals 10 mm Hg,  consistent with moderate pulmonary hypertension.      < from: CT Head No Cont (09.26.18 @ 17:00) >  No acute hemorrhage or infarct.  Progressing microvascular ischemic change compared with prior study dated   12/20/2013..

## 2018-09-26 NOTE — CONSULT NOTE ADULT - SUBJECTIVE AND OBJECTIVE BOX
HPI:  CHIEF COMPLAINT:Patient is a 89y old  Male who presents with a chief complaint of generalized weakness/fever.    HPI,  89 y/o male PMH of TIA,  seizure disorder , A.Fib (s/p pacemaker placement)  HTN,  HLD, systolic HF, bladder ca, s/p TURBT on 2018 and recent radiation therapy   who presents to the ED for generalized weakness. patient reports feeling weak and fatigued for 4 days. a few days of loose stools, no recent abx.   last hospitalization is in july. tmax at home was 99. no cough or uri symptoms. no abd pain, n/v. no dysuria, but does have sensation of incomplete emptying.   has not been eating or drinking for days and has been too weak to stand.        PAST MEDICAL & SURGICAL HISTORY:  Cancer: prostate/bladder cancer  Pacemaker  HF (heart failure)  Seizure  Prostate cancer  BPH (benign prostatic hypertrophy)  PAF (paroxysmal atrial fibrillation)  TIA (Transient Ischemic Attack)  Prostate Ca: s/p seed implant  Hyperlipemia  HTN (Hypertension)  H/O prostate cancer: seed implant  No Past Surgical History      MEDICATIONS  (STANDING):    MEDICATIONS  (PRN):      FAMILY HISTORY:  No pertinent family history in first degree relatives      SOCIAL HISTORY:    [ ] Non-smoker  [ ] Smoker  [ ] Alcohol    Allergies    ACE inhibitors (Other)    Intolerances    	    REVIEW OF SYSTEMS:  CONSTITUTIONAL: No fever, weight loss, or fatigue  EYES: No eye pain, visual disturbances, or discharge  ENT:  No difficulty hearing, tinnitus, vertigo; No sinus or throat pain  NECK: No pain or stiffness  RESPIRATORY: No cough, wheezing, chills or hemoptysis; + Shortness of Breath  CARDIOVASCULAR: No chest pain, palpitations, passing out, dizziness, or leg swelling  GASTROINTESTINAL: No abdominal or epigastric pain. No nausea, vomiting, or hematemesis; No diarrhea or constipation. No melena or hematochezia.  GENITOURINARY: No dysuria, frequency, hematuria, or incontinence  NEUROLOGICAL: No headaches, memory loss, loss of strength, numbness, or tremors, mildly confuse  SKIN: No itching, burning, rashes, or lesions   LYMPH Nodes: No enlarged glands  ENDOCRINE: No heat or cold intolerance; No hair loss  MUSCULOSKELETAL: No joint pain or swelling; No muscle, back, or extremity pain  PSYCHIATRIC: No depression, anxiety, mood swings, or difficulty sleeping  HEME/LYMPH: No easy bruising, or bleeding gums  ALLERGY AND IMMUNOLOGIC: No hives or eczema	    [ ] All others negative	  [ ] Unable to obtain    PHYSICAL EXAM:  T(C): 37 (18 @ 15:27), Max: 38.6 (18 @ 13:20)  HR: 61 (18 @ 15:27) (61 - 76)  BP: 152/59 (18 @ 15:27) (152/59 - 190/76)  RR: 23 (18 @ 15:27) (18 - 24)  SpO2: 98% (18 @ 15:27) (96% - 100%)  Wt(kg): --  I&O's Summary      Appearance: Normal	  HEENT:   Normal oral mucosa, PERRL, EOMI	  Lymphatic: No lymphadenopathy  Cardiovascular: Normal S1 S2, No JVD, + murmurs, No edema  Respiratory: Lungs clear to auscultation	  Psychiatry: A & O x 3, Mood & affect appropriate  Gastrointestinal:  Soft, Non-tender, + BS	  Skin: No rashes, No ecchymoses, No cyanosis	  Neurologic: Non-focal  Extremities: Normal range of motion, No clubbing, cyanosis or edema  Vascular: Peripheral pulses palpable 2+ bilaterally    TELEMETRY: 	    ECG:  	  RADIOLOGY:  OTHER: 	  	  LABS:	 	    CARDIAC MARKERS:                              12.3   7.6   )-----------( 169      ( 26 Sep 2018 13:17 )             35.0         137  |  100  |  13  ----------------------------<  96  3.7   |  27  |  1.08    Ca    9.9      26 Sep 2018 13:17    TPro  7.3  /  Alb  3.9  /  TBili  0.4  /  DBili  x   /  AST  84<H>  /  ALT  43  /  AlkPhos  94      proBNP:   Lipid Profile:   HgA1c:   TSH:   PT/INR - ( 26 Sep 2018 13:17 )   PT: 14.2 sec;   INR: 1.31 ratio         PTT - ( 26 Sep 2018 13:17 )  PTT:31.3 sec    PREVIOUS DIAGNOSTIC TESTING:    < from: Transthoracic Echocardiogram (16.13 @ 13:44) >  1. Mild mitral annular calcification, otherwise normal  mitral valve. Mild mitral regurgitation.  2. Normal trileaflet aortic valve. Mild aortic  regurgitation.  3. Mild concentric left ventricular hypertrophy.  4. Hyperdynamic left ventricle.  Ejection fraction is 75%.  5. Moderate diastolic dysfunction (Stage II).  6. Estimated right ventricular systolic pressure equals 56  mm Hg, assuming right atrial pressure equals 10 mm Hg,  consistent with moderate pulmonary hypertension.      < from: CT Head No Cont (18 @ 17:00) >  No acute hemorrhage or infarct.  Progressing microvascular ischemic change compared with prior study dated   2013.. (26 Sep 2018 17:23)      REVIEW OF SYSTEMS:  GEN: no fever,    no chills  RESP: no SOB,   no cough  CVS: no chest pain,   no palpitations  GI: no abdominal pain,   no nausea,   no vomiting,   no constipation,   no diarrhea  : no dysuria,   no frequency  NEURO: no headache,   no dizziness  PSYCH: no depression,   not anxious  Derm : no rash    Allergies    ACE inhibitors (Other)    Intolerances        CAPILLARY BLOOD GLUCOSE      POCT Blood Glucose.: 92 mg/dL (26 Sep 2018 12:42)    I&O's Summary      Vital Signs Last 24 Hrs  T(C): 36.9 (26 Sep 2018 18:34), Max: 38.6 (26 Sep 2018 13:20)  T(F): 98.4 (26 Sep 2018 18:34), Max: 101.4 (26 Sep 2018 13:20)  HR: 60 (26 Sep 2018 18:34) (60 - 76)  BP: 174/74 (26 Sep 2018 18:34) (152/59 - 190/76)  BP(mean): --  RR: 21 (26 Sep 2018 18:34) (18 - 24)  SpO2: 99% (26 Sep 2018 18:34) (96% - 100%)  PHYSICAL EXAM:  GENERAL: NAD,   HEAD:  Atraumatic, Normocephalic  EYES: EOMI,  NECK: Supple, No JVD  CHEST/LUNG: Clear to auscultation bilaterally; No wheeze  HEART: Regular rate and rhythm;        murmur  ABDOMEN: Soft, Nontender,   EXTREMITIES:         edema  NEUROLOGY:  alert    MEDICATIONS  (STANDING):  aspirin enteric coated 81 milliGRAM(s) Oral daily  digoxin     Tablet 0.125 milliGRAM(s) Oral daily  enoxaparin Injectable 40 milliGRAM(s) SubCutaneous daily  levETIRAcetam 500 milliGRAM(s) Oral two times a day  metoprolol succinate  milliGRAM(s) Oral two times a day  simvastatin 20 milliGRAM(s) Oral at bedtime  tamsulosin 0.4 milliGRAM(s) Oral at bedtime    MEDICATIONS  (PRN):    LABS:                        12.3   7.6   )-----------( 169      ( 26 Sep 2018 13:17 )             35.0         137  |  100  |  13  ----------------------------<  96  3.7   |  27  |  1.08    Ca    9.9      26 Sep 2018 13:17    TPro  7.3  /  Alb  3.9  /  TBili  0.4  /  DBili  x   /  AST  84<H>  /  ALT  43  /  AlkPhos  94      PT/INR - ( 26 Sep 2018 13:17 )   PT: 14.2 sec;   INR: 1.31 ratio         PTT - ( 26 Sep 2018 13:17 )  PTT:31.3 sec      Urinalysis Basic - ( 26 Sep 2018 15:50 )    Color: Yellow / Appearance: Clear / S.024 / pH: x  Gluc: x / Ketone: Trace  / Bili: Negative / Urobili: Negative   Blood: x / Protein: 30 mg/dL / Nitrite: Negative   Leuk Esterase: Negative / RBC: 12 /hpf / WBC 4 /hpf   Sq Epi: x / Non Sq Epi: 4 /hpf / Bacteria: Negative           @ 13:08  3.4  24              Consultant(s) Notes Reviewed:      Care Discussed with Consultants/Other Providers:  Plan:

## 2018-09-27 LAB
ALBUMIN SERPL ELPH-MCNC: 3.4 G/DL — SIGNIFICANT CHANGE UP (ref 3.3–5)
ALP SERPL-CCNC: 94 U/L — SIGNIFICANT CHANGE UP (ref 40–120)
ALT FLD-CCNC: 37 U/L — SIGNIFICANT CHANGE UP (ref 10–45)
ANION GAP SERPL CALC-SCNC: 12 MMOL/L — SIGNIFICANT CHANGE UP (ref 5–17)
AST SERPL-CCNC: 81 U/L — HIGH (ref 10–40)
BILIRUB SERPL-MCNC: 0.6 MG/DL — SIGNIFICANT CHANGE UP (ref 0.2–1.2)
BUN SERPL-MCNC: 13 MG/DL — SIGNIFICANT CHANGE UP (ref 7–23)
CALCIUM SERPL-MCNC: 9.1 MG/DL — SIGNIFICANT CHANGE UP (ref 8.4–10.5)
CHLORIDE SERPL-SCNC: 103 MMOL/L — SIGNIFICANT CHANGE UP (ref 96–108)
CO2 SERPL-SCNC: 23 MMOL/L — SIGNIFICANT CHANGE UP (ref 22–31)
CREAT SERPL-MCNC: 0.99 MG/DL — SIGNIFICANT CHANGE UP (ref 0.5–1.3)
CULTURE RESULTS: NO GROWTH — SIGNIFICANT CHANGE UP
GLUCOSE SERPL-MCNC: 117 MG/DL — HIGH (ref 70–99)
POTASSIUM SERPL-MCNC: 3.4 MMOL/L — LOW (ref 3.5–5.3)
POTASSIUM SERPL-SCNC: 3.4 MMOL/L — LOW (ref 3.5–5.3)
PROT SERPL-MCNC: 6.8 G/DL — SIGNIFICANT CHANGE UP (ref 6–8.3)
SODIUM SERPL-SCNC: 138 MMOL/L — SIGNIFICANT CHANGE UP (ref 135–145)
SPECIMEN SOURCE: SIGNIFICANT CHANGE UP

## 2018-09-27 PROCEDURE — 99232 SBSQ HOSP IP/OBS MODERATE 35: CPT

## 2018-09-27 RX ORDER — CEFTRIAXONE 500 MG/1
1 INJECTION, POWDER, FOR SOLUTION INTRAMUSCULAR; INTRAVENOUS EVERY 24 HOURS
Qty: 0 | Refills: 0 | Status: DISCONTINUED | OUTPATIENT
Start: 2018-09-27 | End: 2018-09-28

## 2018-09-27 RX ORDER — HYDRALAZINE HCL 50 MG
5 TABLET ORAL ONCE
Qty: 0 | Refills: 0 | Status: COMPLETED | OUTPATIENT
Start: 2018-09-27 | End: 2018-09-27

## 2018-09-27 RX ORDER — ACETAMINOPHEN 500 MG
650 TABLET ORAL ONCE
Qty: 0 | Refills: 0 | Status: COMPLETED | OUTPATIENT
Start: 2018-09-27 | End: 2018-09-27

## 2018-09-27 RX ORDER — POTASSIUM CHLORIDE 20 MEQ
20 PACKET (EA) ORAL ONCE
Qty: 0 | Refills: 0 | Status: COMPLETED | OUTPATIENT
Start: 2018-09-27 | End: 2018-09-28

## 2018-09-27 RX ADMIN — CEFTRIAXONE 100 GRAM(S): 500 INJECTION, POWDER, FOR SOLUTION INTRAMUSCULAR; INTRAVENOUS at 11:22

## 2018-09-27 RX ADMIN — LEVETIRACETAM 500 MILLIGRAM(S): 250 TABLET, FILM COATED ORAL at 02:54

## 2018-09-27 RX ADMIN — Medication 650 MILLIGRAM(S): at 07:35

## 2018-09-27 RX ADMIN — Medication 100 MILLIGRAM(S): at 17:43

## 2018-09-27 RX ADMIN — Medication 0.12 MILLIGRAM(S): at 02:54

## 2018-09-27 RX ADMIN — LEVETIRACETAM 500 MILLIGRAM(S): 250 TABLET, FILM COATED ORAL at 17:43

## 2018-09-27 RX ADMIN — TAMSULOSIN HYDROCHLORIDE 0.4 MILLIGRAM(S): 0.4 CAPSULE ORAL at 21:37

## 2018-09-27 RX ADMIN — ENOXAPARIN SODIUM 40 MILLIGRAM(S): 100 INJECTION SUBCUTANEOUS at 11:22

## 2018-09-27 RX ADMIN — SIMVASTATIN 20 MILLIGRAM(S): 20 TABLET, FILM COATED ORAL at 21:37

## 2018-09-27 RX ADMIN — Medication 81 MILLIGRAM(S): at 11:22

## 2018-09-27 RX ADMIN — Medication 100 MILLIGRAM(S): at 02:54

## 2018-09-27 RX ADMIN — Medication 5 MILLIGRAM(S): at 00:50

## 2018-09-27 NOTE — PROVIDER CONTACT NOTE (OTHER) - SITUATION
pt hypertensive upon admission to [address] [place] [address], blood pressure taken both electronically and manually

## 2018-09-27 NOTE — PROGRESS NOTE ADULT - ASSESSMENT
pt  with htn, tia,  afib, ppm, cardiomyopathy  , tia,. hld    ca  bladder /  small  cell cancer/    s/p  turbt  s/p  gross hematuria in the past  urology/  dr scott  on dig// asa/keppra/ beta  blocker  now  with  weakness/ dehydration   iv fluids  ct  chest. no pna, ct head, no new  cva  rvp, negative  febrile/ on rocephin/ ID eval   dvt ppx pt  with htn, tia,  afib, ppm, cardiomyopathy  , tia,. hld    ca  bladder /  small  cell cancer/    s/p  turbt  s/p  gross hematuria in the past  urology/  dr scott  on dig// asa/keppra/ beta  blocker  now  with  weakness/ dehydration   iv fluids  ct  chest. no pna, ct head, no new  cva  rvp, negative  febrile chills. / on rocephin/ ID eval  diarrhea/ c  diff  spoke with wife   dvt ppx

## 2018-09-27 NOTE — PROVIDER CONTACT NOTE (OTHER) - ACTION/TREATMENT ORDERED:
PA ordered IV Hydralazine to bring pressure down. PA assessed pt and agrees that patient is asymptomatic

## 2018-09-27 NOTE — CONSULT NOTE ADULT - SUBJECTIVE AND OBJECTIVE BOX
HPI:   Patient is a 89y male who lives home with his wife, h/o bladder ca per notes recent RT and prior turbt admitted yesterday with report of 3 days of diarrhea, not clear if blood present, 1 day of fever, poor po intake, lethargy, difficulty ambulating. He was placed on ceftriaxone and is feeling a little better but had lots of diarrhea early this am. He also started coughing over the past 24 hours. He took no recent antibiotics, no travel and no raw or imported food. No sick contacts. He did visit his family in the hospital day prior to onset of diarrhea. He may have some vomiting but not clear. He was a bit confused as well.     REVIEW OF SYSTEMS:  All other review of systems negative (Comprehensive ROS)    PAST MEDICAL & SURGICAL HISTORY:  Cancer: prostate/bladder cancer  Pacemaker  HF (heart failure)  Seizure  Prostate cancer  BPH (benign prostatic hypertrophy)  PAF (paroxysmal atrial fibrillation)  TIA (Transient Ischemic Attack)  Prostate Ca: s/p seed implant  Hyperlipemia  HTN (Hypertension)  H/O prostate cancer: seed implant  No Past Surgical History      Allergies    ACE inhibitors (Other)    Intolerances        Antimicrobials Day #  :2  cefTRIAXone   IVPB 1 Gram(s) IV Intermittent every 24 hours    Other Medications:  aspirin enteric coated 81 milliGRAM(s) Oral daily  digoxin     Tablet 0.125 milliGRAM(s) Oral daily  enoxaparin Injectable 40 milliGRAM(s) SubCutaneous daily  influenza   Vaccine 0.5 milliLiter(s) IntraMuscular once  levETIRAcetam 500 milliGRAM(s) Oral two times a day  metoprolol succinate  milliGRAM(s) Oral two times a day  simvastatin 20 milliGRAM(s) Oral at bedtime  sodium chloride 0.9%. 1000 milliLiter(s) IV Continuous <Continuous>  tamsulosin 0.4 milliGRAM(s) Oral at bedtime      FAMILY HISTORY:  No pertinent family history in first degree relatives      SOCIAL HISTORY:  Smoking: [ ]Yes [ ]No  ETOH: [ ]Yes [ ]No  Drug Use: [ ]Yes [ ]No   [ ] Single[ ]    T(F): 99.3 (18 @ 09:46), Max: 100.2 (18 @ 07:03)  HR: 83 (18 @ 09:46)  BP: 142/64 (18 @ 09:46)  RR: 18 (18 @ 09:46)  SpO2: 92% (18 @ 09:46)  Wt(kg): --    PHYSICAL EXAM:  General: alert, no acute distress  Eyes:  anicteric, no conjunctival injection, no discharge  Oropharynx: no lesions or injection 	  Neck: supple, without adenopathy  Lungs: bilateral rales to auscultation  Heart: s1s2; no murmur, rubs or gallops  Abdomen: soft, nondistended, nontender, without mass or organomegaly  Skin: no lesions  Extremities: no clubbing, cyanosis, or edema  Neurologic: alert, , moves all extremities  scrotum no swelling or tenderness  back no lesions  LAB RESULTS:                        12.2   7.8   )-----------( 148      ( 26 Sep 2018 23:26 )             36.2         138  |  103  |  13  ----------------------------<  117<H>  3.4<L>   |  23  |  0.99    Ca    9.1      27 Sep 2018 10:51    TPro  6.8  /  Alb  3.4  /  TBili  0.6  /  DBili  x   /  AST  81<H>  /  ALT  37  /  AlkPhos  94      LIVER FUNCTIONS - ( 27 Sep 2018 10:51 )  Alb: 3.4 g/dL / Pro: 6.8 g/dL / ALK PHOS: 94 U/L / ALT: 37 U/L / AST: 81 U/L / GGT: x           Urinalysis Basic - ( 26 Sep 2018 15:50 )    Color: Yellow / Appearance: Clear / S.024 / pH: x  Gluc: x / Ketone: Trace  / Bili: Negative / Urobili: Negative   Blood: x / Protein: 30 mg/dL / Nitrite: Negative   Leuk Esterase: Negative / RBC: 12 /hpf / WBC 4 /hpf   Sq Epi: x / Non Sq Epi: 4 /hpf / Bacteria: Negative        MICROBIOLOGY:  RECENT CULTURES:    Rapid Respiratory Viral Panel (18 @ 13:08)    Rapid RVP Result: NotDetec: The [employer] RVP Rapid uses polymerase chain reaction (PCR) and melt  curve analysis to screen for adenovirus; coronavirus HKU1, NL63, 229E,  OC43; human metapneumovirus (hMPV); human enterovirus/rhinovirus  (Entero/RV); influenza A; influenza A/H1;influenza A/H3; influenza  A/H1-2009; influenza B; parainfluenza viruses 1, 2, 3, 4; respiratory  syncytial virus; Bordetella pertussis; Mycoplasma pneumoniae; and  Chlamydophila pneumoniae.        RADIOLOGY REVIEWED:    < from: CT Chest No Cont (18 @ 17:04) >    EXAM:  CT CHEST                            PROCEDURE DATE:  2018            INTERPRETATION:  Reason for Exam:  Fever x4 days    CT of the chest was performed from the thoracic inlet to the level of the   adrenal glands without contrast injection.    Comparison: 2018 chest CT    Tubes/Lines: Dual-chamber pacemaker noted.    Mediastinum/Vessels/Heart: Thyroid gland is unremarkable. Aorta and   pulmonary arteries are normal in size. There is no lymphadenopathy. No   pericardial effusion. There is multichamber cardiac enlargement.    Lungs/Pleura/Airways: Biapical pleural-parenchymal scarring noted.   Centrilobular emphysema is noted. Trace bilateral effusions with minimal   bibasilar atelectasis.    Visualized abdomen: Unremarkable noncontrast appearance of the upper   abdomen    Bones and soft tissues: Degenerative changes noted throughout the spine.   No suspicious osseous lesions.    IMPRESSION:    No parenchymal consolidations to suggest pneumonia.    Trace bilateral pleural effusions    Remaining incidental findings as above      < end of copied text >  < from: CT Head No Cont (18 @ 17:00) >  Impression:    No acute hemorrhage or infarct.  Progressing microvascular ischemic change compared with prior study dated   2013..          < end of copied text >        Impression:  Patient with h/o bladder ca not on chemo as best can tell , admitted with a few days of diarrhea, decrease po intake, some fever yesterday, very weak all over. He is on ceftriaxone and feels better but no recent antibiotics prior to admit. He could have cdif but not high risk, could have campylbacter, salmonella, yersinia, ecoli enterocolitis. He has a bit of a cough but no pneumonia seen on cat chest. Uti not apparent. Could just have a viral syndrome as well.     Recommendations:  For now will continue ceftriaxone pending cultures  follow up blood and urine cx  stool c and s, cdif ordered, will add stool pcr and o/p   supportive care

## 2018-09-27 NOTE — CHART NOTE - NSCHARTNOTEFT_GEN_A_CORE
CC:      HPI:  Called by RN  Patient denied headache, dizziness, CP, SOB, abdominal  pain, N/V/D, numbness/tingling, extremity weakness, dysuria.         ROS:  CONSTITUTIONAL:  No fever, chills, rigors  CARDIOVASCULAR:  No chest pain or palpitations  RESPIRATORY:   No SOB, cough, dyspnea on exertion.  No wheezing  GASTROINTESTINAL:  No abd pain, N/V, diarrhea/constipation  EXTREMITIES:  No swelling or joint pain  GENITOURINARY:  No burning on urination, increased frequency or urgency.  No flank pain  NEUROLOGIC:  No HA, visual disturbances  SKIN: No rashes        PAST MEDICAL & SURGICAL HISTORY:  Fever  H/o or current diagnosis of HF- ACEI/ARB contraindication unknown  H/o or current diagnosis of HF- ACEI/ARB contraindication unknown  H/o or current diagnosis of HF- Contraindication to ACEI/ARBs  H/o or current diagnosis of HF- ACEI/ARB contraindication unknown  H/o or current diagnosis of HF- no contraindication to ACEI/ARBs  H/o or current diagnosis of HF- ACEI/ARB contraindication unknown  H/o or current diagnosis of HF- no contraindication to ACEI/ARBs  H/o or current diagnosis of HF- ACEI/ARB contraindication unknown  H/o or current diagnosis of HF- no contraindication to ACEI/ARBs  No pertinent family history in first degree relatives  Handoff  MEWS Score  Cancer  Pacemaker  HF (heart failure)  Seizure  Prostate cancer  BPH (benign prostatic hypertrophy)  PAF (paroxysmal atrial fibrillation)  TIA (Transient Ischemic Attack)  Prostate Ca  Hyperlipemia  HTN (Hypertension)  Fever  H/O prostate cancer  No Past Surgical History  GENERALIZED WEAKNESS  90+  Weakness        Vital Signs Last 24 Hrs  T(C): 36.7 (26 Sep 2018 23:58), Max: 38.6 (26 Sep 2018 13:20)  T(F): 98.1 (26 Sep 2018 23:58), Max: 101.4 (26 Sep 2018 13:20)  HR: 70 (26 Sep 2018 23:58) (60 - 76)  BP: 190/75 (26 Sep 2018 23:58) (152/59 - 190/76)  BP(mean): --  RR: 20 (26 Sep 2018 23:58) (18 - 24)  SpO2: 98% (26 Sep 2018 23:58) (96% - 100%)      Physical Exam:  General: WN/WD NAD, AOx3, nontoxic appearing  Head:  NC/AT  CV: RRR, S1S2   Respiratory: CTA B/L, nonlabored  Abdominal: (+) bowel sounds x4. Soft, NT, ND, no palpable mass, no guarding, or rebound tenderness  Genitourinary: ? [place]   MSK: No BLLE edema, + peripheral pulses, FROM all 4 extremity  Skin: (+) warm, dry   Psych: Appropriate affect       Labs:                        12.2   7.8   )-----------( 148      ( 26 Sep 2018 23:26 )             36.2     09-26    137  |  100  |  13  ----------------------------<  96  3.7   |  27  |  1.08    Ca    9.9      26 Sep 2018 13:17    TPro  7.3  /  Alb  3.9  /  TBili  0.4  /  DBili  x   /  AST  84<H>  /  ALT  43  /  AlkPhos  94  09-26      Urinalysis Basic - ( 09-26 @ 15:50 )    Color: Negative / Appearance: Negative / SG: -- / pH: Negative  Gluc: Small / Ketone: Yellow  / Bili: -- / Urobili: 8   Blood: 4 / Protein: -- / Nitrite: Trace   Leuk Esterase: Negative / RBC: 1.024 / WBC --   Sq Epi: 30 mg/dL / Non Sq Epi: 12 / Bacteria: 6.5            Radiology:          Assessment & Plan:  HPI:  CHIEF COMPLAINT:Patient is a 89y old  Male who presents with a chief complaint of generalized weakness/fever.    HPI Objective Statement: 89 y/o male PMH of TIA, seizure disorder, A.Fib (s/p pacemaker placement) HTN, HLD, systolic HF, bladder ca, s/p TURBT on 05/16/2018 and recent radiation therapy who presents to the ED for generalized weakness. patient reports feeling weak and fatigued for 4 days. a few days of loose stools, no recent abx. last hospitalization is in july. tmax at home was 99. no cough or uri symptoms. no abd pain, n/v. no dysuria, but does have sensation of incomplete emptying. has not been eating or drinking for days and has been too weak to stand.        PAST MEDICAL & SURGICAL HISTORY:  Cancer: prostate/bladder cancer  Pacemaker  HF (heart failure)  Seizure  Prostate cancer  BPH (benign prostatic hypertrophy)  PAF (paroxysmal atrial fibrillation)  TIA (Transient Ischemic Attack)  Prostate Ca: s/p seed implant  Hyperlipemia  HTN (Hypertension)  H/O prostate cancer: seed implant  No Past Surgical History      MEDICATIONS  (STANDING):    MEDICATIONS  (PRN):      FAMILY HISTORY:  No pertinent family history in first degree relatives      SOCIAL HISTORY:    [ ] Non-smoker  [ ] Smoker  [ ] Alcohol    Allergies    ACE inhibitors (Other)    Intolerances    	    REVIEW OF SYSTEMS:  CONSTITUTIONAL: No fever, weight loss, or fatigue  EYES: No eye pain, visual disturbances, or discharge  ENT:  No difficulty hearing, tinnitus, vertigo; No sinus or throat pain  NECK: No pain or stiffness  RESPIRATORY: No cough, wheezing, chills or hemoptysis; + Shortness of Breath  CARDIOVASCULAR: No chest pain, palpitations, passing out, dizziness, or leg swelling  GASTROINTESTINAL: No abdominal or epigastric pain. No nausea, vomiting, or hematemesis; No diarrhea or constipation. No melena or hematochezia.  GENITOURINARY: No dysuria, frequency, hematuria, or incontinence  NEUROLOGICAL: No headaches, memory loss, loss of strength, numbness, or tremors, mildly confuse  SKIN: No itching, burning, rashes, or lesions   LYMPH Nodes: No enlarged glands  ENDOCRINE: No heat or cold intolerance; No hair loss  MUSCULOSKELETAL: No joint pain or swelling; No muscle, back, or extremity pain  PSYCHIATRIC: No depression, anxiety, mood swings, or difficulty sleeping  HEME/LYMPH: No easy bruising, or bleeding gums  ALLERGY AND IMMUNOLOGIC: No hives or eczema	    [ ] All others negative	  [ ] Unable to obtain    PHYSICAL EXAM:  T(C): 37 (09-26-18 @ 15:27), Max: 38.6 (09-26-18 @ 13:20)  HR: 61 (09-26-18 @ 15:27) (61 - 76)  BP: 152/59 (09-26-18 @ 15:27) (152/59 - 190/76)  RR: 23 (09-26-18 @ 15:27) (18 - 24)  SpO2: 98% (09-26-18 @ 15:27) (96% - 100%)  Wt(kg): --  I&O's Summary      Appearance: Normal	  HEENT:   Normal oral mucosa, PERRL, EOMI	  Lymphatic: No lymphadenopathy  Cardiovascular: Normal S1 S2, No JVD, + murmurs, No edema  Respiratory: Lungs clear to auscultation	  Psychiatry: A & O x 3, Mood & affect appropriate  Gastrointestinal:  Soft, Non-tender, + BS	  Skin: No rashes, No ecchymoses, No cyanosis	  Neurologic: Non-focal  Extremities: Normal range of motion, No clubbing, cyanosis or edema  Vascular: Peripheral pulses palpable 2+ bilaterally    TELEMETRY: 	    ECG:  	  RADIOLOGY:  OTHER: 	  	  LABS:	 	    CARDIAC MARKERS:                              12.3   7.6   )-----------( 169      ( 26 Sep 2018 13:17 )             35.0     09-26    137  |  100  |  13  ----------------------------<  96  3.7   |  27  |  1.08    Ca    9.9      26 Sep 2018 13:17    TPro  7.3  /  Alb  3.9  /  TBili  0.4  /  DBili  x   /  AST  84<H>  /  ALT  43  /  AlkPhos  94  09-26    proBNP:   Lipid Profile:   HgA1c:   TSH:   PT/INR - ( 26 Sep 2018 13:17 )   PT: 14.2 sec;   INR: 1.31 ratio         PTT - ( 26 Sep 2018 13:17 )  PTT:31.3 sec    PREVIOUS DIAGNOSTIC TESTING:    < from: Transthoracic Echocardiogram (12.16.13 @ 13:44) >  1. Mild mitral annular calcification, otherwise normal  mitral valve. Mild mitral regurgitation.  2. Normal trileaflet aortic valve. Mild aortic  regurgitation.  3. Mild concentric left ventricular hypertrophy.  4. Hyperdynamic left ventricle.  Ejection fraction is 75%.  5. Moderate diastolic dysfunction (Stage II).  6. Estimated right ventricular systolic pressure equals 56  mm Hg, assuming right atrial pressure equals 10 mm Hg,  consistent with moderate pulmonary hypertension.      < from: CT Head No Cont (09.26.18 @ 17:00) >  No acute hemorrhage or infarct.  Progressing microvascular ischemic change compared with prior study dated   12/20/2013.. (26 Sep 2018 17:23)        -  -  -  -Primary Team to follow up in AM, attending to follow       Shelbie Mendez PA-C  Dept of Medicine CC: /75      HPI:  Patient received from ED to floor, called by RN that patient with /75. Patient seen and assessed at bedside, resting comfortably, NAD, A&O x3, wife at bedside.  Patient denied headache, visual changes, dizziness, palpitations, CP, SOB, abdominal  pain, or N/V. He endorsed diarrhea. He states his BP at home is usually -150s. Per his wife, patient's BP was uncontrolled for some time but he then started taking his medications which brought BP under control.         ROS:  CONSTITUTIONAL:  No fever, chills  CARDIOVASCULAR:  No chest pain or palpitations  RESPIRATORY:   No SOB, cough, dyspnea on exertion  GASTROINTESTINAL:  (+) diarrhea. No abd pain, N/V  EXTREMITIES:  No swelling or joint pain  NEUROLOGIC:  No HA, visual disturbances        PAST MEDICAL & SURGICAL HISTORY:  Cancer: prostate/bladder cancer  Pacemaker  HF (heart failure)  Seizure  Prostate cancer  BPH (benign prostatic hypertrophy)  PAF (paroxysmal atrial fibrillation)  TIA (Transient Ischemic Attack)  Prostate Ca: s/p seed implant  Hyperlipemia  HTN (Hypertension)  H/O prostate cancer: seed implant  No Past Surgical History          Vital Signs Last 24 Hrs  T(C): 36.7 (26 Sep 2018 23:58), Max: 38.6 (26 Sep 2018 13:20)  T(F): 98.1 (26 Sep 2018 23:58), Max: 101.4 (26 Sep 2018 13:20)  HR: 70 (26 Sep 2018 23:58) (60 - 76)  BP: 190/75 (26 Sep 2018 23:58) (152/59 - 190/76)  RR: 20 (26 Sep 2018 23:58) (18 - 24)  SpO2: 98% (26 Sep 2018 23:58) (96% - 100%)      Physical Exam:  General: WN/WD, NAD, laying in bed, AOx3, nontoxic appearing  Head:  NC/AT  CV: (+) systolic murmur, RRR, S1S2   Respiratory: CTA B/L, nonlabored  Abdominal: (+) bowel sounds x4. Soft, NT, ND, no palpable mass, no guarding, or rebound tenderness  MSK: No BLLE edema, + peripheral pulses, FROM all 4 extremity  Skin: (+) warm, dry   Psych: Appropriate affect       Labs:              12.2   7.8   )-----------( 148      ( 26 Sep 2018 23:26 )             36.2     09-26    137  |  100  |  13  ----------------------------<  96  3.7   |  27  |  1.08    Ca    9.9      26 Sep 2018 13:17    TPro  7.3  /  Alb  3.9  /  TBili  0.4  /  DBili  x   /  AST  84<H>  /  ALT  43  /  AlkPhos  94  09-26      Urinalysis Basic - ( 09-26 @ 15:50 )    Color: Negative / Appearance: Negative / SG: -- / pH: Negative  Gluc: Small / Ketone: Yellow  / Bili: -- / Urobili: 8   Blood: 4 / Protein: -- / Nitrite: Trace   Leuk Esterase: Negative / RBC: 1.024 / WBC --   Sq Epi: 30 mg/dL / Non Sq Epi: 12 / Bacteria: 6.5            Radiology:  < from: Xray Chest 1 View- PORTABLE-Urgent (09.26.18 @ 13:35) >  Impression:  The heart is slightly enlarged. The lungs are clear. A pacer is in good   position. No acute bony abnormalities could be identified.  < end of copied text >      < from: CT Chest No Cont (09.26.18 @ 17:04) >  INTERPRETATION:  No pneumonia.  small bilateral pleural effusions.  emphysema.  < end of copied text >    < from: CT Head No Cont (09.26.18 @ 17:00) >  Impression:  No acute hemorrhage or infarct.  Progressing microvascular ischemic change compared with prior study dated   12/20/2013..  < end of copied text >                  Assessment & Plan:  HPI:  89 y/o male PMH of TIA, seizure disorder, A.Fib (s/p pacemaker placement) HTN, HLD, systolic HF, bladder ca, s/p TURBT on 05/16/2018 and recent radiation therapy who presents to the ED for generalized weakness. patient reports feeling weak and fatigued for 4 days. a few days of loose stools, no recent abx. last hospitalization is in july.     Patient now found to have /75; he was also hypertensive in the ED. Toprol XL 100mg was administered at 18:46 and tamsulosin 0.4mg was administered at 21:09. Patient is asymptomatic and denies any complaints. Vital signs otherwise hemodynamically stable. Labs essentially unremarkable.         Asymptomatic hypertension A/P:  -Hydralazine 5mg IVP x1 dose, will recheck BP 30 min post administration  -Continue with Toprol XL BID  -Consider addition of second anti-hypertensive agent if patient is persistently hypertensive  -Will continue to closely monitor patient/vitals  -Primary Team to follow up in AM, attending to follow       Shelbie Mendez PA-C  Dept of Medicine  [id number] CC: /75, manual 180/67      HPI:  Patient received from ED to floor, called by RN that patient with /75. Patient seen and assessed at bedside, resting comfortably, NAD, A&O x3, wife at bedside.  Patient denied headache, visual changes, dizziness, palpitations, CP, SOB, abdominal  pain, or N/V. He endorsed diarrhea. He states his BP at home is usually -150s. Per his wife, patient's BP was uncontrolled for some time but he then started taking his medications which brought BP under control.         ROS:  CONSTITUTIONAL:  No fever, chills  CARDIOVASCULAR:  No chest pain or palpitations  RESPIRATORY:   No SOB, cough, dyspnea on exertion  GASTROINTESTINAL:  (+) diarrhea. No abd pain, N/V  EXTREMITIES:  No swelling or joint pain  NEUROLOGIC:  No HA, visual disturbances        PAST MEDICAL & SURGICAL HISTORY:  Cancer: prostate/bladder cancer  Pacemaker  HF (heart failure)  Seizure  Prostate cancer  BPH (benign prostatic hypertrophy)  PAF (paroxysmal atrial fibrillation)  TIA (Transient Ischemic Attack)  Prostate Ca: s/p seed implant  Hyperlipemia  HTN (Hypertension)  H/O prostate cancer: seed implant  No Past Surgical History          Vital Signs Last 24 Hrs  T(C): 36.7 (26 Sep 2018 23:58), Max: 38.6 (26 Sep 2018 13:20)  T(F): 98.1 (26 Sep 2018 23:58), Max: 101.4 (26 Sep 2018 13:20)  HR: 70 (26 Sep 2018 23:58) (60 - 76)  BP: 190/75 (26 Sep 2018 23:58) (152/59 - 190/76)  RR: 20 (26 Sep 2018 23:58) (18 - 24)  SpO2: 98% (26 Sep 2018 23:58) (96% - 100%)      Physical Exam:  General: WN/WD, NAD, laying in bed, AOx3, nontoxic appearing  Head:  NC/AT  CV: (+) systolic murmur, RRR, S1S2   Respiratory: CTA B/L, nonlabored  Abdominal: (+) bowel sounds x4. Soft, NT, ND, no palpable mass, no guarding, or rebound tenderness  MSK: No BLLE edema, + peripheral pulses, FROM all 4 extremity  Skin: (+) warm, dry   Psych: Appropriate affect       Labs:              12.2   7.8   )-----------( 148      ( 26 Sep 2018 23:26 )             36.2     09-26    137  |  100  |  13  ----------------------------<  96  3.7   |  27  |  1.08    Ca    9.9      26 Sep 2018 13:17    TPro  7.3  /  Alb  3.9  /  TBili  0.4  /  DBili  x   /  AST  84<H>  /  ALT  43  /  AlkPhos  94  09-26      Urinalysis Basic - ( 09-26 @ 15:50 )    Color: Negative / Appearance: Negative / SG: -- / pH: Negative  Gluc: Small / Ketone: Yellow  / Bili: -- / Urobili: 8   Blood: 4 / Protein: -- / Nitrite: Trace   Leuk Esterase: Negative / RBC: 1.024 / WBC --   Sq Epi: 30 mg/dL / Non Sq Epi: 12 / Bacteria: 6.5            Radiology:  < from: Xray Chest 1 View- PORTABLE-Urgent (09.26.18 @ 13:35) >  Impression:  The heart is slightly enlarged. The lungs are clear. A pacer is in good   position. No acute bony abnormalities could be identified.  < end of copied text >      < from: CT Chest No Cont (09.26.18 @ 17:04) >  INTERPRETATION:  No pneumonia.  small bilateral pleural effusions.  emphysema.  < end of copied text >    < from: CT Head No Cont (09.26.18 @ 17:00) >  Impression:  No acute hemorrhage or infarct.  Progressing microvascular ischemic change compared with prior study dated   12/20/2013..  < end of copied text >                  Assessment & Plan:  HPI:  89 y/o male PMH of TIA, seizure disorder, A.Fib (s/p pacemaker placement) HTN, HLD, systolic HF, bladder ca, s/p TURBT on 05/16/2018 and recent radiation therapy who presents to the ED for generalized weakness. patient reports feeling weak and fatigued for 4 days. a few days of loose stools, no recent abx. last hospitalization is in july.     Patient now found to have /75 (electronic) and 180/67 (manual); he was also hypertensive in the ED. Toprol XL 100mg was administered at 18:46 and tamsulosin 0.4mg was administered at 21:09. Patient is asymptomatic and denies any complaints. Vital signs otherwise hemodynamically stable. Labs essentially unremarkable.         Asymptomatic hypertension A/P:  -Hydralazine 5mg IVP x1 dose, will recheck BP 30 min post administration  -Continue with Toprol XL BID  -Consider addition of second anti-hypertensive agent if patient is persistently hypertensive  -Will continue to closely monitor patient/vitals  -Primary Team to follow up in AM, attending to follow       Shelbie Mendez PA-C  Dept of Medicine  [id number]

## 2018-09-27 NOTE — PROGRESS NOTE ADULT - SUBJECTIVE AND OBJECTIVE BOX
- Patient seen and examined.  - In summary, patient is a 89 year old man who presented with generalized weakness/fever (27 Sep 2018 07:10)  - Today, patient is without complaints.         *****MEDICATIONS:    MEDICATIONS  (STANDING):  aspirin enteric coated 81 milliGRAM(s) Oral daily  cefTRIAXone   IVPB 1 Gram(s) IV Intermittent every 24 hours  digoxin     Tablet 0.125 milliGRAM(s) Oral daily  enoxaparin Injectable 40 milliGRAM(s) SubCutaneous daily  influenza   Vaccine 0.5 milliLiter(s) IntraMuscular once  levETIRAcetam 500 milliGRAM(s) Oral two times a day  metoprolol succinate  milliGRAM(s) Oral two times a day  simvastatin 20 milliGRAM(s) Oral at bedtime  sodium chloride 0.9%. 1000 milliLiter(s) (50 mL/Hr) IV Continuous <Continuous>  tamsulosin 0.4 milliGRAM(s) Oral at bedtime    MEDICATIONS  (PRN):           ***** REVIEW OF SYSTEM:  GEN: no fever, no chills, no pain  RESP: no SOB, no cough, no sputum  CVS: no chest pain, no palpitations, no edema  GI: no abdominal pain, no nausea, no vomiting, no constipation, no diarrhea  : no dysuria, no frequency  NEURO: no headache, no dizziness  PSYCH: no depression, not anxious  Derm : no itching, no rash         ***** VITAL SIGNS:  T(F): 99.3 (18 @ 09:46), Max: 101.4 (18 @ 13:20)  HR: 83 (18 @ 09:46) (60 - 83)  BP: 142/64 (18 @ 09:46) (142/64 - 190/76)  RR: 18 (18 @ 09:46) (18 - 24)  SpO2: 92% (18 @ 09:46) (92% - 100%)  Wt(kg): --  ,   I&O's Summary           *****PHYSICAL EXAM:  GEN: A&O X 3 , NAD , comfortable  HEENT: NCAT, EOMI, MMM, no icterus  NECK: Supple, No JVD  CVS: S1S2 , regular , No M/R/G appreciated  PULM: CTA B/L,  no W/R/R appreciated  ABD.: soft. non tender, non distended,  bowel sounds present  Extrem: intact pulses , no edema noted  Derm: No rash or ecchymosis noted  PSYCH: normal mood, no depression, not anxious         *****LAB AND IMAGIN.2   7.8   )-----------( 148      ( 26 Sep 2018 23:26 )             36.2                   137  |  100  |  13  ----------------------------<  96  3.7   |  27  |  1.08    Ca    9.9      26 Sep 2018 13:17    TPro  7.3  /  Alb  3.9  /  TBili  0.4  /  DBili  x   /  AST  84<H>  /  ALT  43  /  AlkPhos  94      PT/INR - ( 26 Sep 2018 13:17 )   PT: 14.2 sec;   INR: 1.31 ratio         PTT - ( 26 Sep 2018 13:17 )  PTT:31.3 sec                   Urinalysis Basic - ( 26 Sep 2018 15:50 )    Color: Yellow / Appearance: Clear / S.024 / pH: x  Gluc: x / Ketone: Trace  / Bili: Negative / Urobili: Negative   Blood: x / Protein: 30 mg/dL / Nitrite: Negative   Leuk Esterase: Negative / RBC: 12 /hpf / WBC 4 /hpf   Sq Epi: x / Non Sq Epi: 4 /hpf / Bacteria: Negative      [All pertinent recent Imaging/Reports reviewed]         *****A S S E S S M E N T   A N D   P L A N :    89M with hx of paf, htn, cva, seizure disorder with change of mental status and fever   head ct no acute change  CT chest no PNA  no AC due hx of hematuria sec to bladder ca s/p recent radiation therapy  neuro eval  f/u cultures  hold abx unless positive culture  dvt prophylaxis      __________________________  DALILA Robertson D.O.

## 2018-09-28 LAB
-  CANDIDA PARAPSILOSIS: SIGNIFICANT CHANGE UP
ALBUMIN SERPL ELPH-MCNC: 3 G/DL — LOW (ref 3.3–5)
ALP SERPL-CCNC: 86 U/L — SIGNIFICANT CHANGE UP (ref 40–120)
ALT FLD-CCNC: 31 U/L — SIGNIFICANT CHANGE UP (ref 10–45)
ANION GAP SERPL CALC-SCNC: 16 MMOL/L — SIGNIFICANT CHANGE UP (ref 5–17)
AST SERPL-CCNC: 62 U/L — HIGH (ref 10–40)
BILIRUB SERPL-MCNC: 0.5 MG/DL — SIGNIFICANT CHANGE UP (ref 0.2–1.2)
BUN SERPL-MCNC: 23 MG/DL — SIGNIFICANT CHANGE UP (ref 7–23)
CALCIUM SERPL-MCNC: 9.7 MG/DL — SIGNIFICANT CHANGE UP (ref 8.4–10.5)
CHLORIDE SERPL-SCNC: 105 MMOL/L — SIGNIFICANT CHANGE UP (ref 96–108)
CO2 SERPL-SCNC: 19 MMOL/L — LOW (ref 22–31)
CREAT SERPL-MCNC: 1.6 MG/DL — HIGH (ref 0.5–1.3)
GLUCOSE SERPL-MCNC: 97 MG/DL — SIGNIFICANT CHANGE UP (ref 70–99)
GRAM STN FLD: SIGNIFICANT CHANGE UP
HCT VFR BLD CALC: 36.5 % — LOW (ref 39–50)
HGB BLD-MCNC: 12 G/DL — LOW (ref 13–17)
MCHC RBC-ENTMCNC: 28 PG — SIGNIFICANT CHANGE UP (ref 27–34)
MCHC RBC-ENTMCNC: 32.7 GM/DL — SIGNIFICANT CHANGE UP (ref 32–36)
MCV RBC AUTO: 85.7 FL — SIGNIFICANT CHANGE UP (ref 80–100)
METHOD TYPE: SIGNIFICANT CHANGE UP
PLATELET # BLD AUTO: 181 K/UL — SIGNIFICANT CHANGE UP (ref 150–400)
POTASSIUM SERPL-MCNC: 4.1 MMOL/L — SIGNIFICANT CHANGE UP (ref 3.5–5.3)
POTASSIUM SERPL-SCNC: 4.1 MMOL/L — SIGNIFICANT CHANGE UP (ref 3.5–5.3)
PROT SERPL-MCNC: 6.9 G/DL — SIGNIFICANT CHANGE UP (ref 6–8.3)
RBC # BLD: 4.27 M/UL — SIGNIFICANT CHANGE UP (ref 4.2–5.8)
RBC # FLD: 12.8 % — SIGNIFICANT CHANGE UP (ref 10.3–14.5)
SODIUM SERPL-SCNC: 140 MMOL/L — SIGNIFICANT CHANGE UP (ref 135–145)
TSH SERPL-MCNC: 2.67 UIU/ML — SIGNIFICANT CHANGE UP (ref 0.27–4.2)
VIT B12 SERPL-MCNC: 827 PG/ML — SIGNIFICANT CHANGE UP (ref 232–1245)
WBC # BLD: 10.3 K/UL — SIGNIFICANT CHANGE UP (ref 3.8–10.5)
WBC # FLD AUTO: 10.3 K/UL — SIGNIFICANT CHANGE UP (ref 3.8–10.5)

## 2018-09-28 PROCEDURE — 76770 US EXAM ABDO BACK WALL COMP: CPT | Mod: 26

## 2018-09-28 RX ORDER — MICAFUNGIN SODIUM 100 MG/1
INJECTION, POWDER, LYOPHILIZED, FOR SOLUTION INTRAVENOUS
Qty: 0 | Refills: 0 | Status: DISCONTINUED | OUTPATIENT
Start: 2018-09-28 | End: 2018-09-30

## 2018-09-28 RX ORDER — MICAFUNGIN SODIUM 100 MG/1
100 INJECTION, POWDER, LYOPHILIZED, FOR SOLUTION INTRAVENOUS ONCE
Qty: 0 | Refills: 0 | Status: COMPLETED | OUTPATIENT
Start: 2018-09-28 | End: 2018-09-28

## 2018-09-28 RX ORDER — MICAFUNGIN SODIUM 100 MG/1
100 INJECTION, POWDER, LYOPHILIZED, FOR SOLUTION INTRAVENOUS EVERY 24 HOURS
Qty: 0 | Refills: 0 | Status: DISCONTINUED | OUTPATIENT
Start: 2018-09-29 | End: 2018-09-30

## 2018-09-28 RX ADMIN — TAMSULOSIN HYDROCHLORIDE 0.4 MILLIGRAM(S): 0.4 CAPSULE ORAL at 21:17

## 2018-09-28 RX ADMIN — Medication 20 MILLIEQUIVALENT(S): at 05:11

## 2018-09-28 RX ADMIN — SIMVASTATIN 20 MILLIGRAM(S): 20 TABLET, FILM COATED ORAL at 21:17

## 2018-09-28 RX ADMIN — MICAFUNGIN SODIUM 105 MILLIGRAM(S): 100 INJECTION, POWDER, LYOPHILIZED, FOR SOLUTION INTRAVENOUS at 13:02

## 2018-09-28 RX ADMIN — Medication 0.12 MILLIGRAM(S): at 05:11

## 2018-09-28 RX ADMIN — LEVETIRACETAM 500 MILLIGRAM(S): 250 TABLET, FILM COATED ORAL at 05:11

## 2018-09-28 RX ADMIN — Medication 100 MILLIGRAM(S): at 18:14

## 2018-09-28 RX ADMIN — SODIUM CHLORIDE 50 MILLILITER(S): 9 INJECTION INTRAMUSCULAR; INTRAVENOUS; SUBCUTANEOUS at 18:19

## 2018-09-28 RX ADMIN — LEVETIRACETAM 500 MILLIGRAM(S): 250 TABLET, FILM COATED ORAL at 18:14

## 2018-09-28 RX ADMIN — Medication 100 MILLIGRAM(S): at 05:11

## 2018-09-28 RX ADMIN — Medication 81 MILLIGRAM(S): at 13:02

## 2018-09-28 RX ADMIN — ENOXAPARIN SODIUM 40 MILLIGRAM(S): 100 INJECTION SUBCUTANEOUS at 13:02

## 2018-09-28 NOTE — PROGRESS NOTE ADULT - SUBJECTIVE AND OBJECTIVE BOX
resting/  afebrile, no  chills    REVIEW OF SYSTEMS:  GEN: no fever,    no chills  RESP: no SOB,   no cough  CVS: no chest pain,   no palpitations  GI: no abdominal pain,   no nausea,   no vomiting,   no constipation,   no diarrhea  : no dysuria,   no frequency  NEURO: no headache,   no dizziness  PSYCH: no depression,   not anxious  Derm : no rash    MEDICATIONS  (STANDING):  aspirin enteric coated 81 milliGRAM(s) Oral daily  cefTRIAXone   IVPB 1 Gram(s) IV Intermittent every 24 hours  digoxin     Tablet 0.125 milliGRAM(s) Oral daily  enoxaparin Injectable 40 milliGRAM(s) SubCutaneous daily  influenza   Vaccine 0.5 milliLiter(s) IntraMuscular once  levETIRAcetam 500 milliGRAM(s) Oral two times a day  metoprolol succinate  milliGRAM(s) Oral two times a day  simvastatin 20 milliGRAM(s) Oral at bedtime  sodium chloride 0.9%. 1000 milliLiter(s) (50 mL/Hr) IV Continuous <Continuous>  tamsulosin 0.4 milliGRAM(s) Oral at bedtime    MEDICATIONS  (PRN):      Vital Signs Last 24 Hrs  T(C): 37.1 (28 Sep 2018 07:22), Max: 37.9 (27 Sep 2018 09:16)  T(F): 98.7 (28 Sep 2018 07:22), Max: 100.2 (27 Sep 2018 09:16)  HR: 67 (28 Sep 2018 07:22) (67 - 83)  BP: 137/60 (28 Sep 2018 07:22) (122/60 - 142/64)  BP(mean): --  RR: 18 (28 Sep 2018 07:22) (18 - 20)  SpO2: 92% (28 Sep 2018 07:22) (92% - 94%)  CAPILLARY BLOOD GLUCOSE        I&O's Summary    27 Sep 2018 07:01  -  28 Sep 2018 07:00  --------------------------------------------------------  IN: 490 mL / OUT: 0 mL / NET: 490 mL        PHYSICAL EXAM:  HEAD:  Atraumatic, Normocephalic  NECK: Supple, No   JVD  CHEST/LUNG:   no     rales,     no,    rhonchi  HEART: Regular rate and rhythm;         murmur  ABDOMEN: Soft, Nontender, ;   EXTREMITIES:        edema  NEUROLOGY:  alert    LABS:                        12.2   7.8   )-----------( 148      ( 26 Sep 2018 23:26 )             36.2         138  |  103  |  13  ----------------------------<  117<H>  3.4<L>   |  23  |  0.99    Ca    9.1      27 Sep 2018 10:51    TPro  6.8  /  Alb  3.4  /  TBili  0.6  /  DBili  x   /  AST  81<H>  /  ALT  37  /  AlkPhos  94      PT/INR - ( 26 Sep 2018 13:17 )   PT: 14.2 sec;   INR: 1.31 ratio         PTT - ( 26 Sep 2018 13:17 )  PTT:31.3 sec      Urinalysis Basic - ( 26 Sep 2018 15:50 )    Color: Yellow / Appearance: Clear / S.024 / pH: x  Gluc: x / Ketone: Trace  / Bili: Negative / Urobili: Negative   Blood: x / Protein: 30 mg/dL / Nitrite: Negative   Leuk Esterase: Negative / RBC: 12 /hpf / WBC 4 /hpf   Sq Epi: x / Non Sq Epi: 4 /hpf / Bacteria: Negative           @ 13:08  3.4  24      Thyroid Stimulating Hormone, Serum: 2.67 uIU/mL ( @ 14:14)          Consultant(s) Notes Reviewed:      Care Discussed with Consultants/Other Providers:

## 2018-09-28 NOTE — PROGRESS NOTE ADULT - SUBJECTIVE AND OBJECTIVE BOX
CC: f/u for fever, failure to thrive, and diarrhea    Patient reports: he is confused, has no abdominal pain, was incontinent of one loose BM today.  He denies any dysuria.He is growing yeast from one bottle of admission blood cultures, it is not growing in urine culture.    REVIEW OF SYSTEMS:  All other review of systems negative (Comprehensive ROS)    Antimicrobials Day #  :day 3 and 1  cefTRIAXone   IVPB 1 Gram(s) IV Intermittent every 24 hours  micafungin IVPB 100 milliGRAM(s) IV Intermittent once  micafungin IVPB        Other Medications Reviewed    T(F): 98.7 (18 @ 07:22), Max: 99.6 (18 @ 16:26)  HR: 67 (18 @ 07:22)  BP: 137/60 (18 @ 07:22)  RR: 18 (18 @ 07:22)  SpO2: 92% (18 @ 07:22)  Wt(kg): --    PHYSICAL EXAM:  General: alert, no acute distress  Eyes:  anicteric, no conjunctival injection, no discharge  Oropharynx: no lesions or injection 	  Neck: supple, without adenopathy  Lungs: clear to auscultation  Heart: irregular rate and rhythm; no murmur, rubs or gallops  Abdomen: soft, nondistended, nontender, without mass or organomegaly  Skin: no lesions  Extremities: no clubbing, cyanosis, or edema  Neurologic: alert, speech hard to understand, moves all extremities    LAB RESULTS:                        12.0   10.3  )-----------( 181      ( 28 Sep 2018 09:03 )             36.5         140  |  105  |  23  ----------------------------<  97  4.1   |  19<L>  |  1.60<H>    Ca    9.7      28 Sep 2018 09:03    TPro  6.9  /  Alb  3.0<L>  /  TBili  0.5  /  DBili  x   /  AST  62<H>  /  ALT  31  /  AlkPhos  86      LIVER FUNCTIONS - ( 28 Sep 2018 09:03 )  Alb: 3.0 g/dL / Pro: 6.9 g/dL / ALK PHOS: 86 U/L / ALT: 31 U/L / AST: 62 U/L / GGT: x           Urinalysis Basic - ( 26 Sep 2018 15:50 )    Color: Yellow / Appearance: Clear / S.024 / pH: x  Gluc: x / Ketone: Trace  / Bili: Negative / Urobili: Negative   Blood: x / Protein: 30 mg/dL / Nitrite: Negative   Leuk Esterase: Negative / RBC: 12 /hpf / WBC 4 /hpf   Sq Epi: x / Non Sq Epi: 4 /hpf / Bacteria: Negative      MICROBIOLOGY:  RECENT CULTURES:   @ 17:10 .Urine Clean Catch (Midstream)     No growth       @ 16:31 .Blood Blood-Peripheral     Growth in aerobic bottle:  Yeast like cells      Growth in aerobic bottle:  Yeast like cells        RADIOLOGY REVIEWED:    < from: CT Chest No Cont (18 @ 17:04) >  IMPRESSION:    No parenchymal consolidations to suggest pneumonia.    Trace bilateral pleural effusions    Remaining incidental findings as above    < end of copied text >  < from: CT Abdomen and Pelvis w/wo IV Cont (18 @ 15:17) >  IMPRESSION:     A 2.7 cm enhancing bladder mass.     No evidence of upper tract disease.    No evidence of adenopathy/metastatic disease.    < end of copied text >

## 2018-09-28 NOTE — PROGRESS NOTE ADULT - ASSESSMENT
pt  with htn, tia,  afib, ppm, cardiomyopathy  , tia,. hld    ca  bladder /  small  cell cancer/    s/p  turbt  s/p  gross hematuria in the past  urology/  dr scott  on dig// asa/keppra/ beta  blocker  now  with  weakness/ dehydration   iv fluids  ct  chest. no pna, ct head, no new  cva  rvp, negative  febrile chills., resolved,  / on rocephin/ ID eval  blood  c/s, negative   dvt ppx

## 2018-09-28 NOTE — PHYSICAL THERAPY INITIAL EVALUATION ADULT - DISCHARGE DISPOSITION, PT EVAL
DC subacute rehab; if pt to go home, home with home PT services for general strengthening, to increase endurance, address fall prevention, perform balance training, safety assessment of home environment, and to restore pt's prior level of function, recommend rolling walker,a ssist from spouse as needed, CM to be notified.

## 2018-09-28 NOTE — PHYSICAL THERAPY INITIAL EVALUATION ADULT - PRECAUTIONS/LIMITATIONS, REHAB EVAL
Pt with PMH of TIA, seizure disorder, A.Fib (s/p pacemaker placement) HTN, HLD, systolic HF, bladder ca, s/p TURBT on 05/16/2018 and recent radiation therapy. CT Chest: No parenchymal consolidations to suggest pneumonia. Trace bilateral pleural effusions Remaining incidental findings as above. CTH: No acute hemorrhage or infarct. CXR: The heart is slightly enlarged. The lungs are clear. A pacer is in good  position. No acute bony abnormalities could be identified. fall precautions/Pt with PMH of TIA, seizure disorder, A.Fib (s/p pacemaker placement) HTN, HLD, systolic HF, bladder ca, s/p TURBT on 05/16/2018 and recent radiation therapy. CT Chest: No parenchymal consolidations to suggest pneumonia. Trace bilateral pleural effusions Remaining incidental findings as above. CTH: No acute hemorrhage or infarct. CXR: The heart is slightly enlarged. The lungs are clear. A pacer is in good  position. No acute bony abnormalities could be identified.

## 2018-09-28 NOTE — PHYSICAL THERAPY INITIAL EVALUATION ADULT - ADDITIONAL COMMENTS
Pt lives with spouse in apt, +elevator, no stairs; PTA ind amb and ADLs, owns walker (does not use, missing wheel)

## 2018-09-28 NOTE — PHYSICAL THERAPY INITIAL EVALUATION ADULT - PERTINENT HX OF CURRENT PROBLEM, REHAB EVAL
Pt is a 89 y/o male admitted to [facility] on 9/26/18 for generalized weakness, feeling weak x4days. Pt with a few days of loose BM, no recent abx. Last hospitalization in july, tmax home was 99. NO cough/URI sx/abd pain/N/V.

## 2018-09-28 NOTE — PROGRESS NOTE ADULT - ASSESSMENT
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  History of HTN,Afib,SSS, PPM,and TIA.  He had hematuria post TURBT but did not have a documented infection.  Yeast in blood is almost never a contaminant and must be assumed to be real.  Candida most common yeast to be isolated.No obvious  focus although his creat has increased to 1.6, we may need to reimage kidneys and bladder.  He has seen Dr Turner and Arpan, ? if he has received any chemo or RT therapy.  Diarrhea w/u in progress.  Suggest:  1.Will stop CTX  2.Micafungin pending ID of yeast  3.repeat blood cultures today  4.Will advise renal /bladder sono to see if his mass has returned, r/o hydro  5.Fungal infections often associated with poor outcomes as they tend to develop in patients with significant underling co-morbidities which is the case here.If he has a sustained fungemia we may need to exclude vascular focus. 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  History of HTN,Afib,SSS, PPM,and TIA.  He had hematuria post TURBT but did not have a documented infection.  Yeast in blood is almost never a contaminant and must be assumed to be real.  Candida most common yeast to be isolated.No obvious  focus although his creat has increased to 1.6, we may need to reimage kidneys and bladder.  He has seen Dr Turner and Arpan, ? if he has received any chemo or RT therapy.  Diarrhea w/u in progress.  Suggest:  1.Will stop CTX  2.Micafungin pending ID of yeast  3.repeat blood cultures today, RN will send GI PCR panel with next BM  4.Will advise renal /bladder sono to see if his mass has returned, r/o hydro  5.Fungal infections often associated with poor outcomes as they tend to develop in patients with significant underling co-morbidities which is the case here.If he has a sustained fungemia we may need to exclude vascular focus.

## 2018-09-28 NOTE — PROGRESS NOTE ADULT - SUBJECTIVE AND OBJECTIVE BOX
- Patient seen and examined.  - In summary, patient is a 89 year old man who presented with generalized weakness/fever (27 Sep 2018 07:10)  - Today, patient is without complaints.         *****MEDICATIONS:    MEDICATIONS  (STANDING):  aspirin enteric coated 81 milliGRAM(s) Oral daily  cefTRIAXone   IVPB 1 Gram(s) IV Intermittent every 24 hours  digoxin     Tablet 0.125 milliGRAM(s) Oral daily  enoxaparin Injectable 40 milliGRAM(s) SubCutaneous daily  influenza   Vaccine 0.5 milliLiter(s) IntraMuscular once  levETIRAcetam 500 milliGRAM(s) Oral two times a day  metoprolol succinate  milliGRAM(s) Oral two times a day  simvastatin 20 milliGRAM(s) Oral at bedtime  sodium chloride 0.9%. 1000 milliLiter(s) (50 mL/Hr) IV Continuous <Continuous>  tamsulosin 0.4 milliGRAM(s) Oral at bedtime    MEDICATIONS  (PRN):             ***** REVIEW OF SYSTEM:  GEN: no fever, no chills, no pain  RESP: no SOB, no cough, no sputum  CVS: no chest pain, no palpitations, no edema  GI: no abdominal pain, no nausea, no vomiting, no constipation, no diarrhea  : no dysuria, no frequency  NEURO: no headache, no dizziness  PSYCH: no depression, not anxious  Derm : no itching, no rash         ***** VITAL SIGNS:    T(F): 98.7 (18 @ 07:22), Max: 99.6 (18 @ 16:26)  HR: 67 (18 @ 07:22) (67 - 77)  BP: 137/60 (18 @ 07:22) (122/60 - 142/52)  RR: 18 (18 @ 07:22) (18 - 20)  SpO2: 92% (18 @ 07:22) (92% - 94%)  Wt(kg): --  ,   I&O's Summary    27 Sep 2018 07:01  -  28 Sep 2018 07:00  --------------------------------------------------------  IN: 490 mL / OUT: 0 mL / NET: 490 mL    28 Sep 2018 07:01  -  28 Sep 2018 10:25  --------------------------------------------------------  IN: 120 mL / OUT: 0 mL / NET: 120 mL                 *****PHYSICAL EXAM:  GEN: A&O X 3 , NAD , comfortable  HEENT: NCAT, EOMI, MMM, no icterus  NECK: Supple, No JVD  CVS: S1S2 , regular , No M/R/G appreciated  PULM: CTA B/L,  no W/R/R appreciated  ABD.: soft. non tender, non distended,  bowel sounds present  Extrem: intact pulses , no edema noted  Derm: No rash or ecchymosis noted  PSYCH: normal mood, no depression, not anxious         *****LAB AND IMAGIN.0   10.3  )-----------( 181      ( 28 Sep 2018 09:03 )             36.5                   138  |  103  |  13  ----------------------------<  117<H>  3.4<L>   |  23  |  0.99    Ca    9.1      27 Sep 2018 10:51    TPro  6.8  /  Alb  3.4  /  TBili  0.6  /  DBili  x   /  AST  81<H>  /  ALT  37  /  AlkPhos  94      PT/INR - ( 26 Sep 2018 13:17 )   PT: 14.2 sec;   INR: 1.31 ratio         PTT - ( 26 Sep 2018 13:17 )  PTT:31.3 sec           [All pertinent recent Imaging/Reports reviewed]         *****A S S E S S M E N T   A N D   P L A N :    89M with hx of paf, htn, cva, seizure disorder with change of mental status and fever   head ct no acute change  CT chest no PNA  no AC due hx of hematuria sec to bladder ca s/p recent radiation therapy  f/u cultures  abx per ID  dvt prophylaxis      __________________________  DALILA Robertson D.O.

## 2018-09-29 LAB
ALBUMIN SERPL ELPH-MCNC: 3.2 G/DL — LOW (ref 3.3–5)
ALP SERPL-CCNC: 87 U/L — SIGNIFICANT CHANGE UP (ref 40–120)
ALT FLD-CCNC: 34 U/L — SIGNIFICANT CHANGE UP (ref 10–45)
ANION GAP SERPL CALC-SCNC: 12 MMOL/L — SIGNIFICANT CHANGE UP (ref 5–17)
AST SERPL-CCNC: 65 U/L — HIGH (ref 10–40)
BASOPHILS # BLD AUTO: 0 K/UL — SIGNIFICANT CHANGE UP (ref 0–0.2)
BASOPHILS NFR BLD AUTO: 0.3 % — SIGNIFICANT CHANGE UP (ref 0–2)
BILIRUB SERPL-MCNC: 0.3 MG/DL — SIGNIFICANT CHANGE UP (ref 0.2–1.2)
BUN SERPL-MCNC: 27 MG/DL — HIGH (ref 7–23)
CALCIUM SERPL-MCNC: 9.7 MG/DL — SIGNIFICANT CHANGE UP (ref 8.4–10.5)
CHLORIDE SERPL-SCNC: 103 MMOL/L — SIGNIFICANT CHANGE UP (ref 96–108)
CO2 SERPL-SCNC: 22 MMOL/L — SIGNIFICANT CHANGE UP (ref 22–31)
CREAT SERPL-MCNC: 1.23 MG/DL — SIGNIFICANT CHANGE UP (ref 0.5–1.3)
EOSINOPHIL # BLD AUTO: 0.2 K/UL — SIGNIFICANT CHANGE UP (ref 0–0.5)
EOSINOPHIL NFR BLD AUTO: 1.9 % — SIGNIFICANT CHANGE UP (ref 0–6)
GLUCOSE SERPL-MCNC: 118 MG/DL — HIGH (ref 70–99)
HCT VFR BLD CALC: 32.9 % — LOW (ref 39–50)
HGB BLD-MCNC: 11.1 G/DL — LOW (ref 13–17)
LYMPHOCYTES # BLD AUTO: 1.3 K/UL — SIGNIFICANT CHANGE UP (ref 1–3.3)
LYMPHOCYTES # BLD AUTO: 16.2 % — SIGNIFICANT CHANGE UP (ref 13–44)
MCHC RBC-ENTMCNC: 28.9 PG — SIGNIFICANT CHANGE UP (ref 27–34)
MCHC RBC-ENTMCNC: 33.7 GM/DL — SIGNIFICANT CHANGE UP (ref 32–36)
MCV RBC AUTO: 85.8 FL — SIGNIFICANT CHANGE UP (ref 80–100)
MONOCYTES # BLD AUTO: 0.7 K/UL — SIGNIFICANT CHANGE UP (ref 0–0.9)
MONOCYTES NFR BLD AUTO: 9 % — SIGNIFICANT CHANGE UP (ref 2–14)
NEUTROPHILS # BLD AUTO: 6 K/UL — SIGNIFICANT CHANGE UP (ref 1.8–7.4)
NEUTROPHILS NFR BLD AUTO: 72.6 % — SIGNIFICANT CHANGE UP (ref 43–77)
PLATELET # BLD AUTO: 182 K/UL — SIGNIFICANT CHANGE UP (ref 150–400)
POTASSIUM SERPL-MCNC: 3.3 MMOL/L — LOW (ref 3.5–5.3)
POTASSIUM SERPL-SCNC: 3.3 MMOL/L — LOW (ref 3.5–5.3)
PROT SERPL-MCNC: 6.6 G/DL — SIGNIFICANT CHANGE UP (ref 6–8.3)
RBC # BLD: 3.84 M/UL — LOW (ref 4.2–5.8)
RBC # FLD: 13.2 % — SIGNIFICANT CHANGE UP (ref 10.3–14.5)
SODIUM SERPL-SCNC: 137 MMOL/L — SIGNIFICANT CHANGE UP (ref 135–145)
WBC # BLD: 8.2 K/UL — SIGNIFICANT CHANGE UP (ref 3.8–10.5)
WBC # FLD AUTO: 8.2 K/UL — SIGNIFICANT CHANGE UP (ref 3.8–10.5)

## 2018-09-29 RX ADMIN — LEVETIRACETAM 500 MILLIGRAM(S): 250 TABLET, FILM COATED ORAL at 05:32

## 2018-09-29 RX ADMIN — MICAFUNGIN SODIUM 105 MILLIGRAM(S): 100 INJECTION, POWDER, LYOPHILIZED, FOR SOLUTION INTRAVENOUS at 11:35

## 2018-09-29 RX ADMIN — Medication 100 MILLIGRAM(S): at 18:00

## 2018-09-29 RX ADMIN — Medication 0.12 MILLIGRAM(S): at 05:31

## 2018-09-29 RX ADMIN — ENOXAPARIN SODIUM 40 MILLIGRAM(S): 100 INJECTION SUBCUTANEOUS at 11:35

## 2018-09-29 RX ADMIN — TAMSULOSIN HYDROCHLORIDE 0.4 MILLIGRAM(S): 0.4 CAPSULE ORAL at 22:49

## 2018-09-29 RX ADMIN — SIMVASTATIN 20 MILLIGRAM(S): 20 TABLET, FILM COATED ORAL at 22:49

## 2018-09-29 RX ADMIN — Medication 81 MILLIGRAM(S): at 11:35

## 2018-09-29 RX ADMIN — LEVETIRACETAM 500 MILLIGRAM(S): 250 TABLET, FILM COATED ORAL at 18:00

## 2018-09-29 RX ADMIN — Medication 100 MILLIGRAM(S): at 05:32

## 2018-09-29 NOTE — PROGRESS NOTE ADULT - SUBJECTIVE AND OBJECTIVE BOX
CARDIOLOGY     PROGRESS  NOTE   ________________________________________________    CHIEF COMPLAINT:Patient is a 89y old  Male who presents with a chief complaint of generalized weakness/fever (29 Sep 2018 06:57)    	  REVIEW OF SYSTEMS:  CONSTITUTIONAL: No fever, weight loss, or fatigue  EYES: No eye pain, visual disturbances, or discharge  ENT:  No difficulty hearing, tinnitus, vertigo; No sinus or throat pain  NECK: No pain or stiffness  RESPIRATORY: No cough, wheezing, chills or hemoptysis; No Shortness of Breath  CARDIOVASCULAR: No chest pain, palpitations, passing out, dizziness, or leg swelling  GASTROINTESTINAL: No abdominal or epigastric pain. No nausea, vomiting, or hematemesis; No diarrhea or constipation. No melena or hematochezia.  GENITOURINARY: No dysuria, frequency, hematuria, or incontinence  NEUROLOGICAL: No headaches, memory loss, loss of strength, numbness, or tremors  SKIN: No itching, burning, rashes, or lesions   LYMPH Nodes: No enlarged glands  ENDOCRINE: No heat or cold intolerance; No hair loss  MUSCULOSKELETAL: No joint pain or swelling; No muscle, back, or extremity pain  PSYCHIATRIC: No depression, anxiety, mood swings, or difficulty sleeping  HEME/LYMPH: No easy bruising, or bleeding gums  ALLERGY AND IMMUNOLOGIC: No hives or eczema	    [ ] All others negative	  [ ] Unable to obtain    PHYSICAL EXAM:  T(C): 36.8 (09-29-18 @ 07:27), Max: 36.8 (09-28-18 @ 16:28)  HR: 66 (09-29-18 @ 07:27) (60 - 71)  BP: 157/64 (09-29-18 @ 07:27) (120/61 - 157/64)  RR: 18 (09-29-18 @ 07:27) (18 - 20)  SpO2: 95% (09-29-18 @ 07:27) (95% - 98%)  Wt(kg): --  I&O's Summary    28 Sep 2018 07:01  -  29 Sep 2018 07:00  --------------------------------------------------------  IN: 940 mL / OUT: 400 mL / NET: 540 mL        Appearance: Normal	  HEENT:   Normal oral mucosa, PERRL, EOMI	  Lymphatic: No lymphadenopathy  Cardiovascular: Normal S1 S2, No JVD, + murmurs, No edema  Respiratory: Lungs clear to auscultation	  Psychiatry: A & O x 3, Mood & affect appropriate  Gastrointestinal:  Soft, Non-tender, + BS	  Skin: No rashes, No ecchymoses, No cyanosis	  Neurologic: Non-focal  Extremities: Normal range of motion, No clubbing, cyanosis or edema  Vascular: Peripheral pulses palpable 2+ bilaterally    MEDICATIONS  (STANDING):  aspirin enteric coated 81 milliGRAM(s) Oral daily  digoxin     Tablet 0.125 milliGRAM(s) Oral daily  enoxaparin Injectable 40 milliGRAM(s) SubCutaneous daily  influenza   Vaccine 0.5 milliLiter(s) IntraMuscular once  levETIRAcetam 500 milliGRAM(s) Oral two times a day  metoprolol succinate  milliGRAM(s) Oral two times a day  micafungin IVPB      micafungin IVPB 100 milliGRAM(s) IV Intermittent every 24 hours  simvastatin 20 milliGRAM(s) Oral at bedtime  sodium chloride 0.9%. 1000 milliLiter(s) (50 mL/Hr) IV Continuous <Continuous>  tamsulosin 0.4 milliGRAM(s) Oral at bedtime      TELEMETRY: 	    ECG:  	  RADIOLOGY:  OTHER: 	  	  LABS:	 	    CARDIAC MARKERS:                                11.1   8.2   )-----------( 182      ( 29 Sep 2018 09:43 )             32.9     09-28    140  |  105  |  23  ----------------------------<  97  4.1   |  19<L>  |  1.60<H>    Ca    9.7      28 Sep 2018 09:03    TPro  6.9  /  Alb  3.0<L>  /  TBili  0.5  /  DBili  x   /  AST  62<H>  /  ALT  31  /  AlkPhos  86  09-28    proBNP:   Lipid Profile:   HgA1c:   TSH: Thyroid Stimulating Hormone, Serum: 2.67 uIU/mL (09-27 @ 14:14)          Assessment and plan  ---------------------------  doing better  fungemia ?  echo  abx  ?ppm

## 2018-09-29 NOTE — PROGRESS NOTE ADULT - SUBJECTIVE AND OBJECTIVE BOX
afebrile/ no chills  REVIEW OF SYSTEMS:  GEN: no fever,    no chills  RESP: no SOB,   no cough  CVS: no chest pain,   no palpitations  GI: no abdominal pain,   no nausea,   no vomiting,   no constipation,   no diarrhea  : no dysuria,   no frequency  NEURO: no headache,   no dizziness  PSYCH: no depression,   not anxious  Derm : no rash    MEDICATIONS  (STANDING):  aspirin enteric coated 81 milliGRAM(s) Oral daily  digoxin     Tablet 0.125 milliGRAM(s) Oral daily  enoxaparin Injectable 40 milliGRAM(s) SubCutaneous daily  influenza   Vaccine 0.5 milliLiter(s) IntraMuscular once  levETIRAcetam 500 milliGRAM(s) Oral two times a day  metoprolol succinate  milliGRAM(s) Oral two times a day  micafungin IVPB      micafungin IVPB 100 milliGRAM(s) IV Intermittent every 24 hours  simvastatin 20 milliGRAM(s) Oral at bedtime  sodium chloride 0.9%. 1000 milliLiter(s) (50 mL/Hr) IV Continuous <Continuous>  tamsulosin 0.4 milliGRAM(s) Oral at bedtime    MEDICATIONS  (PRN):      Vital Signs Last 24 Hrs  T(C): 36.7 (29 Sep 2018 04:55), Max: 37.1 (28 Sep 2018 07:22)  T(F): 98.1 (29 Sep 2018 04:55), Max: 98.7 (28 Sep 2018 07:22)  HR: 60 (29 Sep 2018 04:55) (60 - 71)  BP: 141/69 (29 Sep 2018 04:55) (120/61 - 141/69)  BP(mean): --  RR: 18 (29 Sep 2018 04:55) (18 - 20)  SpO2: 96% (29 Sep 2018 04:55) (92% - 98%)  CAPILLARY BLOOD GLUCOSE        I&O's Summary    27 Sep 2018 07:01  -  28 Sep 2018 07:00  --------------------------------------------------------  IN: 490 mL / OUT: 0 mL / NET: 490 mL    28 Sep 2018 07:01  -  29 Sep 2018 06:57  --------------------------------------------------------  IN: 940 mL / OUT: 400 mL / NET: 540 mL        PHYSICAL EXAM:  HEAD:  Atraumatic, Normocephalic  NECK: Supple, No   JVD  CHEST/LUNG:   no     rales,     no,    rhonchi  HEART: Regular rate and rhythm;         murmur  ABDOMEN: Soft, Nontender, ;   EXTREMITIES:        edema  NEUROLOGY:  alert    LABS:                        12.0   10.3  )-----------( 181      ( 28 Sep 2018 09:03 )             36.5     09-28    140  |  105  |  23  ----------------------------<  97  4.1   |  19<L>  |  1.60<H>    Ca    9.7      28 Sep 2018 09:03    TPro  6.9  /  Alb  3.0<L>  /  TBili  0.5  /  DBili  x   /  AST  62<H>  /  ALT  31  /  AlkPhos  86  09-28                    Thyroid Stimulating Hormone, Serum: 2.67 uIU/mL (09-27 @ 14:14)          Consultant(s) Notes Reviewed:      Care Discussed with Consultants/Other Providers:

## 2018-09-29 NOTE — PROGRESS NOTE ADULT - ASSESSMENT
pt  with htn, tia,  afib, ppm, cardiomyopathy  , tia,. hld    ca  bladder /  small  cell cancer/    s/p  turbt  s/p  gross hematuria in the past  urology/  dr scott  on dig// asa/keppra/ beta  blocker  now  with  weakness/ dehydration  ct  chest. no pna, ct head, no new  cva  pt with candidemia ,  on micafungin/ candida   parapsilosis  optho eval   dvt ppx pt  with htn, tia,  afib, ppm, cardiomyopathy  , tia,. hld    ca  bladder /  small  cell cancer/    s/p  turbt  s/p  gross hematuria in the past  urology/  dr scott  on dig// asa/keppra/ beta  blocker  now  with  weakness/ dehydration  ct  chest. no pna, ct head, no new  cva  pt with candidemia ,  on micafungin/ candida   parapsilosis  may need to consider extraction od PPM  abd  u/s  optho eval   dvt ppx

## 2018-09-29 NOTE — CHART NOTE - NSCHARTNOTEFT_GEN_A_CORE
Request from Dr Hernandez to call Opthalmology consult for evaluation due to positive blood culture with Candida Parapsilosis.  Despite six separate attempts today to reach the Opthalmology service at the designated pager [phone], including an attempt directly by the , no response was achieved.   Dr Hernandez made aware of inability to contact Opthalmology to relay consult request.  Team to attempt again tomorrow.  Pt denies any visual changes.

## 2018-09-30 LAB
ANION GAP SERPL CALC-SCNC: 11 MMOL/L — SIGNIFICANT CHANGE UP (ref 5–17)
BUN SERPL-MCNC: 19 MG/DL — SIGNIFICANT CHANGE UP (ref 7–23)
C DIFF GDH STL QL: NEGATIVE — SIGNIFICANT CHANGE UP
C DIFF GDH STL QL: SIGNIFICANT CHANGE UP
CALCIUM SERPL-MCNC: 9.9 MG/DL — SIGNIFICANT CHANGE UP (ref 8.4–10.5)
CHLORIDE SERPL-SCNC: 104 MMOL/L — SIGNIFICANT CHANGE UP (ref 96–108)
CO2 SERPL-SCNC: 23 MMOL/L — SIGNIFICANT CHANGE UP (ref 22–31)
CREAT SERPL-MCNC: 0.93 MG/DL — SIGNIFICANT CHANGE UP (ref 0.5–1.3)
CULTURE RESULTS: SIGNIFICANT CHANGE UP
GLUCOSE SERPL-MCNC: 102 MG/DL — HIGH (ref 70–99)
HCT VFR BLD CALC: 33.5 % — LOW (ref 39–50)
HGB BLD-MCNC: 11.4 G/DL — LOW (ref 13–17)
MCHC RBC-ENTMCNC: 28.4 PG — SIGNIFICANT CHANGE UP (ref 27–34)
MCHC RBC-ENTMCNC: 34 GM/DL — SIGNIFICANT CHANGE UP (ref 32–36)
MCV RBC AUTO: 83.3 FL — SIGNIFICANT CHANGE UP (ref 80–100)
PLATELET # BLD AUTO: 217 K/UL — SIGNIFICANT CHANGE UP (ref 150–400)
POTASSIUM SERPL-MCNC: 3.8 MMOL/L — SIGNIFICANT CHANGE UP (ref 3.5–5.3)
POTASSIUM SERPL-SCNC: 3.8 MMOL/L — SIGNIFICANT CHANGE UP (ref 3.5–5.3)
RBC # BLD: 4.02 M/UL — LOW (ref 4.2–5.8)
RBC # FLD: 13.7 % — SIGNIFICANT CHANGE UP (ref 10.3–14.5)
SODIUM SERPL-SCNC: 138 MMOL/L — SIGNIFICANT CHANGE UP (ref 135–145)
SPECIMEN SOURCE: SIGNIFICANT CHANGE UP
WBC # BLD: 7.97 K/UL — SIGNIFICANT CHANGE UP (ref 3.8–10.5)
WBC # FLD AUTO: 7.97 K/UL — SIGNIFICANT CHANGE UP (ref 3.8–10.5)

## 2018-09-30 PROCEDURE — 74177 CT ABD & PELVIS W/CONTRAST: CPT | Mod: 26

## 2018-09-30 RX ORDER — FLUCONAZOLE 150 MG/1
400 TABLET ORAL DAILY
Qty: 0 | Refills: 0 | Status: DISCONTINUED | OUTPATIENT
Start: 2018-09-30 | End: 2018-10-03

## 2018-09-30 RX ADMIN — Medication 100 MILLIGRAM(S): at 18:27

## 2018-09-30 RX ADMIN — MICAFUNGIN SODIUM 105 MILLIGRAM(S): 100 INJECTION, POWDER, LYOPHILIZED, FOR SOLUTION INTRAVENOUS at 12:13

## 2018-09-30 RX ADMIN — ENOXAPARIN SODIUM 40 MILLIGRAM(S): 100 INJECTION SUBCUTANEOUS at 12:12

## 2018-09-30 RX ADMIN — LEVETIRACETAM 500 MILLIGRAM(S): 250 TABLET, FILM COATED ORAL at 18:27

## 2018-09-30 RX ADMIN — Medication 0.12 MILLIGRAM(S): at 05:38

## 2018-09-30 RX ADMIN — FLUCONAZOLE 400 MILLIGRAM(S): 150 TABLET ORAL at 21:16

## 2018-09-30 RX ADMIN — TAMSULOSIN HYDROCHLORIDE 0.4 MILLIGRAM(S): 0.4 CAPSULE ORAL at 21:16

## 2018-09-30 RX ADMIN — LEVETIRACETAM 500 MILLIGRAM(S): 250 TABLET, FILM COATED ORAL at 05:38

## 2018-09-30 RX ADMIN — Medication 81 MILLIGRAM(S): at 12:12

## 2018-09-30 RX ADMIN — Medication 100 MILLIGRAM(S): at 05:38

## 2018-09-30 RX ADMIN — SIMVASTATIN 20 MILLIGRAM(S): 20 TABLET, FILM COATED ORAL at 21:16

## 2018-09-30 RX ADMIN — SODIUM CHLORIDE 50 MILLILITER(S): 9 INJECTION INTRAMUSCULAR; INTRAVENOUS; SUBCUTANEOUS at 05:43

## 2018-09-30 NOTE — PROGRESS NOTE ADULT - SUBJECTIVE AND OBJECTIVE BOX
CC: f/u for candidemia    Patient reports  feels ok but not enjoying the po contrast for ct  REVIEW OF SYSTEMS:  All other review of systems negative (Comprehensive ROS)    Antimicrobials Day #  :3 antifungal tx  fluconAZOLE   Tablet 400 milliGRAM(s) Oral daily    Other Medications Reviewed    T(F): 98.1 (09-30-18 @ 16:15), Max: 98.3 (09-30-18 @ 05:36)  HR: 64 (09-30-18 @ 16:15)  BP: 165/62 (09-30-18 @ 16:15)  RR: 18 (09-30-18 @ 16:15)  SpO2: 98% (09-30-18 @ 16:15)  Wt(kg): --    PHYSICAL EXAM:  General: alert, no acute distress  Eyes:  anicteric, no conjunctival injection, no discharge  Oropharynx: no lesions or injection 	  Neck: supple, without adenopathy  Lungs: clear to auscultation  Heart: s1s2   Abdomen: soft, nondistended, nontender, without mass or organomegaly  Skin: no lesions  Extremities: no clubbing, cyanosis, or edema  Neurologic: alert,  moves all extremities  No scrotum swelling  LAB RESULTS:                        11.4   7.97  )-----------( 217      ( 30 Sep 2018 11:11 )             33.5     09-30    138  |  104  |  19  ----------------------------<  102<H>  3.8   |  23  |  0.93    Ca    9.9      30 Sep 2018 09:31    TPro  6.6  /  Alb  3.2<L>  /  TBili  0.3  /  DBili  x   /  AST  65<H>  /  ALT  34  /  AlkPhos  87  09-29    LIVER FUNCTIONS - ( 29 Sep 2018 09:43 )  Alb: 3.2 g/dL / Pro: 6.6 g/dL / ALK PHOS: 87 U/L / ALT: 34 U/L / AST: 65 U/L / GGT: x             MICROBIOLOGY:  RECENT CULTURES:  09-30 @ 12:07 .Stool Feces     GI PCR Results: NOT detected  *******Please Note:*******  GI panel PCR evaluates for:  Campylobacter, Plesiomonas shigelloides, Salmonella,  Vibrio, Yersinia enterocolitica, Enteroaggregative  Escherichia coli (EAEC), Enteropathogenic E.coli (EPEC),  Enterotoxigenic E. coli (ETEC) lt/st, Shiga-like  toxin-producing E. coli (STEC) stx1/stx2,  Shigella/ Enteroinvasive E. coli (EIEC), Cryptosporidium,  Cyclospora cayetanensis, Entamoeba histolytica,  Giardia lamblia, Adenovirus F 40/41, Astrovirus,  Norovirus GI/GII, Rotavirus A, Sapovirus      09-28 @ 19:10 .Blood Blood-Peripheral     No growth to date.      09-26 @ 17:10 .Urine Clean Catch (Midstream)     No growth      09-26 @ 16:31 .Blood Blood-Peripheral Blood Culture PCR    Growth in aerobic bottle: Candida parapsilosis Susceptibility to follow.  "Due to technical problems, Proteus sp. will Not be reported as part of  the BCID panel until further notice"  ***Blood Panel PCR results on this specimen are available  approximately 3 hours after the Gram stain result.***  Gram stain, PCR, and/or culture results may not always  correspond due to difference in methodologies.  ************************************************************  This PCR assay was performed using[employer].  The following targets are tested for: Enterococcus,  vancomycin resistant enterococci, Listeria monocytogenes,  coagulase negative staphylococci, S. aureus,  methicillin resistant S. aureus, Streptococcus agalactiae  (Group B), S.pneumoniae, S. pyogenes (Group A),  Acinetobacter baumannii, Enterobacter cloacae, E. coli,  Klebsiella oxytoca, K. pneumoniae, Proteus sp.,  Serratia marcescens, Haemophilus influenzae,  Neisseria meningitidis, Pseudomonas aeruginosa, Candida  albicans, C. glabrata, C krusei, C parapsilosis,  C. tropicalis and the KPC resistance gene.    Growth in aerobic bottle:  Yeast like cells        RADIOLOGY REVIEWED:  < from: US Kidney and Bladder (09.28.18 @ 16:51) >  EXAM:  US KIDNEYS AND BLADDER                            PROCEDURE DATE:  09/28/2018            INTERPRETATION:  CLINICAL INFORMATION: Increased creatinine. History of   bladder carcinoma status post TURB and radiation therapy. Evaluate for   hydronephrosis.    COMPARISON: Correlation is made with prior abdominal CT dated 4/27/2018.    TECHNIQUE: Sonography of the kidneys and bladder.     FINDINGS:    Right kidney:  9.1 cm. No hydronephrosis.    Left kidney:  11.3 cm. No hydronephrosis.    Urinary bladder: Underdistended. An echogenic focus with posterior   shadowing measuring 0.7 x 0.4 x 0.7 cm is noted at the right posterior   aspect of the urinary bladder.    IMPRESSION:     No hydronephrosis.    Limited evaluation of the urinary bladderwhich is underdistended. A 0.7   cm echogenic focus is noted at the right posterior aspect of the urinary   bladder. The differential diagnosis includes surgical material and   calcification. A bladder mass cannot be excluded.          < end of copied text >      Assessment:  patient admitted a few days ago with profound weakness, some diarrhea and found to have fungemia with candida parapsilosis. he has history of bladder cancer but not had any manipulation in the last few weeks and urine culture is negative. Source is not clear  Plan: change to difgisselle po fine  await ct abd and pelvis  echo  ophtho eval when can reach them  gu f/u  await latest cultures

## 2018-09-30 NOTE — PROGRESS NOTE ADULT - SUBJECTIVE AND OBJECTIVE BOX
CARDIOLOGY     PROGRESS  NOTE   ________________________________________________    CHIEF COMPLAINT:Patient is a 89y old  Male who presents with a chief complaint of generalized weakness/fever (30 Sep 2018 08:03)    	  REVIEW OF SYSTEMS:  CONSTITUTIONAL: No fever, weight loss, or fatigue  EYES: No eye pain, visual disturbances, or discharge  ENT:  No difficulty hearing, tinnitus, vertigo; No sinus or throat pain  NECK: No pain or stiffness  RESPIRATORY: No cough, wheezing, chills or hemoptysis; No Shortness of Breath  CARDIOVASCULAR: No chest pain, palpitations, passing out, dizziness, or leg swelling  GASTROINTESTINAL: No abdominal or epigastric pain. No nausea, vomiting, or hematemesis; No diarrhea or constipation. No melena or hematochezia.  GENITOURINARY: No dysuria, frequency, hematuria, or incontinence  NEUROLOGICAL: No headaches, memory loss, loss of strength, numbness, or tremors  SKIN: No itching, burning, rashes, or lesions   LYMPH Nodes: No enlarged glands  ENDOCRINE: No heat or cold intolerance; No hair loss  MUSCULOSKELETAL: No joint pain or swelling; No muscle, back, or extremity pain  PSYCHIATRIC: No depression, anxiety, mood swings, or difficulty sleeping  HEME/LYMPH: No easy bruising, or bleeding gums  ALLERGY AND IMMUNOLOGIC: No hives or eczema	    [ ] All others negative	  [ ] Unable to obtain    PHYSICAL EXAM:  T(C): 36.8 (09-30-18 @ 07:23), Max: 37.2 (09-29-18 @ 16:07)  HR: 62 (09-30-18 @ 07:23) (61 - 99)  BP: 175/69 (09-30-18 @ 07:23) (141/59 - 175/69)  RR: 18 (09-30-18 @ 07:23) (18 - 18)  SpO2: 97% (09-30-18 @ 07:23) (96% - 97%)  Wt(kg): --  I&O's Summary    29 Sep 2018 07:01  -  30 Sep 2018 07:00  --------------------------------------------------------  IN: 1644 mL / OUT: 550 mL / NET: 1094 mL        Appearance: Normal	  HEENT:   Normal oral mucosa, PERRL, EOMI	  Lymphatic: No lymphadenopathy  Cardiovascular: Normal S1 S2, No JVD, + murmurs, No edema  Respiratory: Lungs clear to auscultation	  Psychiatry: A & O x 3, Mood & affect appropriate  Gastrointestinal:  Soft, Non-tender, + BS	  Skin: No rashes, No ecchymoses, No cyanosis	  Neurologic: Non-focal  Extremities: Normal range of motion, No clubbing, cyanosis or edema  Vascular: Peripheral pulses palpable 2+ bilaterally    MEDICATIONS  (STANDING):  aspirin enteric coated 81 milliGRAM(s) Oral daily  digoxin     Tablet 0.125 milliGRAM(s) Oral daily  enoxaparin Injectable 40 milliGRAM(s) SubCutaneous daily  influenza   Vaccine 0.5 milliLiter(s) IntraMuscular once  levETIRAcetam 500 milliGRAM(s) Oral two times a day  metoprolol succinate  milliGRAM(s) Oral two times a day  micafungin IVPB      micafungin IVPB 100 milliGRAM(s) IV Intermittent every 24 hours  simvastatin 20 milliGRAM(s) Oral at bedtime  sodium chloride 0.9%. 1000 milliLiter(s) (50 mL/Hr) IV Continuous <Continuous>  tamsulosin 0.4 milliGRAM(s) Oral at bedtime      TELEMETRY: 	    ECG:  	  RADIOLOGY:  OTHER: 	  	  LABS:	 	    CARDIAC MARKERS:                                11.1   8.2   )-----------( 182      ( 29 Sep 2018 09:43 )             32.9     09-30    138  |  104  |  19  ----------------------------<  102<H>  3.8   |  23  |  0.93    Ca    9.9      30 Sep 2018 09:31    TPro  6.6  /  Alb  3.2<L>  /  TBili  0.3  /  DBili  x   /  AST  65<H>  /  ALT  34  /  AlkPhos  87  09-29    proBNP:   Lipid Profile:   HgA1c:   TSH: Thyroid Stimulating Hormone, Serum: 2.67 uIU/mL (09-27 @ 14:14)          Assessment and plan  ---------------------------  candidemia, abx as per ID  echo  ct abdomen and pelvis  add norvasc for bp control

## 2018-09-30 NOTE — PROGRESS NOTE ADULT - ASSESSMENT
pt  with htn, tia,  afib, ppm, cardiomyopathy  , tia,. hld    ca  bladder /  small  cell cancer/    s/p  turbt  s/p  gross hematuria in the past  urology/  dr scott  on dig// asa/keppra/ beta  blocker  now  with  weakness/ dehydration  ct  chest. no pna, ct head, no new  cva  pt with candidemia ,  on micafungin/ candida   parapsilosis  may need to consider extraction od PPM/ if  fungemia  cannot be controlled/ defer to  ep  abd  u/s, noted  rpt blood  c/s, negative  thus  ar/ will await  incubation  optho eval   dvt ppx

## 2018-10-01 LAB
ANION GAP SERPL CALC-SCNC: 10 MMOL/L — SIGNIFICANT CHANGE UP (ref 5–17)
BUN SERPL-MCNC: 12 MG/DL — SIGNIFICANT CHANGE UP (ref 7–23)
CALCIUM SERPL-MCNC: 9.7 MG/DL — SIGNIFICANT CHANGE UP (ref 8.4–10.5)
CHLORIDE SERPL-SCNC: 101 MMOL/L — SIGNIFICANT CHANGE UP (ref 96–108)
CO2 SERPL-SCNC: 25 MMOL/L — SIGNIFICANT CHANGE UP (ref 22–31)
CREAT SERPL-MCNC: 0.75 MG/DL — SIGNIFICANT CHANGE UP (ref 0.5–1.3)
CULTURE RESULTS: SIGNIFICANT CHANGE UP
GLUCOSE SERPL-MCNC: 121 MG/DL — HIGH (ref 70–99)
HCT VFR BLD CALC: 32.1 % — LOW (ref 39–50)
HGB BLD-MCNC: 11.3 G/DL — LOW (ref 13–17)
MCHC RBC-ENTMCNC: 29.7 PG — SIGNIFICANT CHANGE UP (ref 27–34)
MCHC RBC-ENTMCNC: 35.1 GM/DL — SIGNIFICANT CHANGE UP (ref 32–36)
MCV RBC AUTO: 84.6 FL — SIGNIFICANT CHANGE UP (ref 80–100)
PLATELET # BLD AUTO: 209 K/UL — SIGNIFICANT CHANGE UP (ref 150–400)
POTASSIUM SERPL-MCNC: 3.6 MMOL/L — SIGNIFICANT CHANGE UP (ref 3.5–5.3)
POTASSIUM SERPL-SCNC: 3.6 MMOL/L — SIGNIFICANT CHANGE UP (ref 3.5–5.3)
RBC # BLD: 3.8 M/UL — LOW (ref 4.2–5.8)
RBC # FLD: 12.9 % — SIGNIFICANT CHANGE UP (ref 10.3–14.5)
SODIUM SERPL-SCNC: 136 MMOL/L — SIGNIFICANT CHANGE UP (ref 135–145)
SPECIMEN SOURCE: SIGNIFICANT CHANGE UP
WBC # BLD: 7.8 K/UL — SIGNIFICANT CHANGE UP (ref 3.8–10.5)
WBC # FLD AUTO: 7.8 K/UL — SIGNIFICANT CHANGE UP (ref 3.8–10.5)

## 2018-10-01 PROCEDURE — 95819 EEG AWAKE AND ASLEEP: CPT | Mod: 26

## 2018-10-01 PROCEDURE — 76700 US EXAM ABDOM COMPLETE: CPT | Mod: 26

## 2018-10-01 RX ORDER — AMLODIPINE BESYLATE 2.5 MG/1
2.5 TABLET ORAL DAILY
Qty: 0 | Refills: 0 | Status: DISCONTINUED | OUTPATIENT
Start: 2018-10-01 | End: 2018-10-03

## 2018-10-01 RX ADMIN — LEVETIRACETAM 500 MILLIGRAM(S): 250 TABLET, FILM COATED ORAL at 19:32

## 2018-10-01 RX ADMIN — Medication 100 MILLIGRAM(S): at 06:28

## 2018-10-01 RX ADMIN — Medication 0.12 MILLIGRAM(S): at 06:28

## 2018-10-01 RX ADMIN — TAMSULOSIN HYDROCHLORIDE 0.4 MILLIGRAM(S): 0.4 CAPSULE ORAL at 22:49

## 2018-10-01 RX ADMIN — Medication 100 MILLIGRAM(S): at 19:31

## 2018-10-01 RX ADMIN — LEVETIRACETAM 500 MILLIGRAM(S): 250 TABLET, FILM COATED ORAL at 06:28

## 2018-10-01 RX ADMIN — Medication 81 MILLIGRAM(S): at 12:37

## 2018-10-01 RX ADMIN — ENOXAPARIN SODIUM 40 MILLIGRAM(S): 100 INJECTION SUBCUTANEOUS at 12:37

## 2018-10-01 RX ADMIN — SIMVASTATIN 20 MILLIGRAM(S): 20 TABLET, FILM COATED ORAL at 22:48

## 2018-10-01 RX ADMIN — AMLODIPINE BESYLATE 2.5 MILLIGRAM(S): 2.5 TABLET ORAL at 12:36

## 2018-10-01 RX ADMIN — FLUCONAZOLE 400 MILLIGRAM(S): 150 TABLET ORAL at 12:37

## 2018-10-01 RX ADMIN — SODIUM CHLORIDE 50 MILLILITER(S): 9 INJECTION INTRAMUSCULAR; INTRAVENOUS; SUBCUTANEOUS at 06:29

## 2018-10-01 NOTE — CONSULT NOTE ADULT - ASSESSMENT
Patient is a 89y old  Male admitted with complaint of generalized weakness/fever. He has multiple comorbidities.  He has history of prostate cancer and had seed implants for them in past.   Patient was seen in May/June 2018. 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 He had hematuria which improved. Patient  had radiation in [place] and finished about 3 weeks back.   At present he has metastatic disease. I do not think that he is a candidate for chemotherapy given the comorbidities and his age. This was  the impression in May/June 2018 too, family also did not wish him to get chemotherapy.  I spoke to patient's wife. She will discuss with her son and daughter.   If family is agreeable palliative care can be involved as prognosis for metastatic small cell cancer is quite poor.  Management of infection, Candidiasis as in chart

## 2018-10-01 NOTE — CONSULT NOTE ADULT - SUBJECTIVE AND OBJECTIVE BOX
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 He had hematuria post TURBT but did not have a documented infection.  He was admitted with lethargy, diarrhea, and failure to thrive. Yeast in blood is almost never a contaminant and must be assumed to be real, even when isolated in only 1/4 bottles.  Candida parapsolosis isolated .No obvious  focus, his renal function has been stable and his urine culture was negative."  Ophthalmology consulted to r/o endophthalmitis. Patient denies visual changes. Denies flashes or floaters. Reports intermittent FBS with tearing OU.       PMH: above  POcHx:  Patient poor historian- CE/PCIOL OU, has not seen an ophthalmologist in a few years  FamOcHx: unknown   Meds: Reviewed; no gtts   Allergies: Ace inhibitors    ROS:  General (neg), Vision (per HPI), Head and Neck (neg), Pulm (neg), CV (neg), GI (neg),  (neg), Musculoskeletal (neg), Skin/Integ (neg), Neuro (neg), Endocrine (neg), Heme (neg), All/Immuno (neg)    Mood and Affect Appropriate ( x ),  Oriented to Time, Place, and Person x 2 ( x )    Ophthalmology Exam    Visual acuity (cc): 20/100 OU PH 20/70- poor cooperation  Pupils: PERRL OU, no APD  Ttono: 10, 11  Extraocular movements (EOMs): Full OU, no pain, no diplopia   Confrontational Visual Field (CVF):  Full OU  Color Plates: 0/12 OU    PLE:   External:  Flat   Lids/Lashes/Lacrimal Ducts: trace blepharitis OU  Sclera/ Conj: W+Q OU  Cornea: Cl OU  Anterior Chamber: D+F OU  Iris:  Flat OU  Lens:  Cl OU    Fundus Exam: dilated with 1% tropicamide and 2.5% phenylephrine  Approval obtained from primary team for dilation  Patient aware that pupils can remained dilated for at least 4-6 hours  Exam performed with 20D lens    Vitreous: wnl OU  Disc, cup/disc: sharp and pink, 0.3 OU  Macula:  wnl OU  Vessels:  wnl OU  Periphery: wnl OU    Diagnostic Testing:  -  Candida parapsilosis: Detec (09.26.18 @ 16:31)        A/P. 89 y.o M w/ fungemia.   - Pending dilated exam    Recommend patient to follow up with an ophthalmologist for further testing including refraction.      Follow-Up:  Patient should follow up his/her ophthalmologist or in the [facility] Ophthalmology Practice within 1 week of discharge, sooner if symptoms worsen or change.  [address] [place] [address].  [place] [id number] [phone]    Please call the ophthalmologist to confirm an appointment time and date prior to discharge. 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 He had hematuria post TURBT but did not have a documented infection.  He was admitted with lethargy, diarrhea, and failure to thrive. Yeast in blood is almost never a contaminant and must be assumed to be real, even when isolated in only 1/4 bottles.  Candida parapsolosis isolated .No obvious  focus, his renal function has been stable and his urine culture was negative."  Ophthalmology consulted to r/o endophthalmitis. Patient denies visual changes. Denies flashes or floaters. Reports intermittent FBS with tearing OU.       PMH: above  POcHx:  Patient poor historian- CE/PCIOL OU, has not seen an ophthalmologist in a few years  FamOcHx: unknown   Meds: Reviewed; no gtts   Allergies: Ace inhibitors    ROS:  General (neg), Vision (per HPI), Head and Neck (neg), Pulm (neg), CV (neg), GI (neg),  (neg), Musculoskeletal (neg), Skin/Integ (neg), Neuro (neg), Endocrine (neg), Heme (neg), All/Immuno (neg)    Mood and Affect Appropriate ( x ),  Oriented to Time, Place, and Person x 2 ( x )    Ophthalmology Exam    Visual acuity (cc): 20/100 OU PH 20/70  (poor cooperation)  Pupils: PERRL OU, no APD  Ttono: 10, 11  Extraocular movements (EOMs): Full OU, no pain, no diplopia   Confrontational Visual Field (CVF):  Grossly full OU limited 2/2 cooperation  Color Plates: 0/12 OU    PLE:   External:  Flat   Lids/Lashes/Lacrimal Ducts: trace blepharitis OU  Sclera/ Conj: W+Q OU  Cornea: Cl OU  Anterior Chamber: D+F OU  Iris:  Flat OU  Lens: PCIOL OU    Fundus Exam: dilated with 1% tropicamide and 2.5% phenylephrine  Approval obtained from primary team for dilation  Patient aware that pupils can remained dilated for at least 4-6 hours  Exam performed with 20D lens    Vitreous: wnl OU  Disc, cup/disc: sharp, 0.45 OU  Macula:  RPE changes OU  Vessels:  wnl OU  Periphery: RPE changes OU    Diagnostic Testing:  -  Candida parapsilosis: Detec (09.26.18 @ 16:31)        A/P. 89 y.o M w/ fungemia.   - Dilated exam with no evidence of endophthalmitis. No heme or infiltrates OU  - Further management as per primary team     Exam notable for mild blepharitis OU.   - Recommend lid scrubs and warm compressed in both eyes TID  - Artificial tears 1 drop in each eye 4 times a day PRN    -Recommend patient to follow up with an ophthalmologist for further testing including refraction.      Follow-Up:  Patient should follow up his/her ophthalmologist or in the [facility] Ophthalmology Practice within 1 week of discharge, sooner if symptoms worsen or change.  [address] [place] [address].  [place] [address] [phone]    Please call the ophthalmologist to confirm an appointment time and date prior to discharge.

## 2018-10-01 NOTE — CONSULT NOTE ADULT - SUBJECTIVE AND OBJECTIVE BOX
HPI:  CHIEF COMPLAINT:Patient is a 89y old  Male admitted with complaint of generalized weakness/fever.  PMH of TIA, seizure disorder, A.Fib (s/p pacemaker placement) HTN, HLD, systolic HF, bladder ca, s/p TURBT on 05/16/2018,  and recent radiation therapy who presents to the ED for generalized weakness.   He has history of prostate cancer and had seed implants for them in past.       PAST MEDICAL & SURGICAL HISTORY:  Cancer: prostate/bladder cancer  Pacemaker  HF (heart failure)  Seizure  Prostate cancer  BPH (benign prostatic hypertrophy)  PAF (paroxysmal atrial fibrillation)  TIA (Transient Ischemic Attack)  Prostate Ca: s/p seed implant  Hyperlipemia  HTN (Hypertension)  H/O prostate cancer: seed implant  No Past Surgical History    Meds:  amLODIPine   Tablet 2.5 milliGRAM(s) Oral daily  aspirin enteric coated 81 milliGRAM(s) Oral daily  digoxin     Tablet 0.125 milliGRAM(s) Oral daily  enoxaparin Injectable 40 milliGRAM(s) SubCutaneous daily  fluconAZOLE   Tablet 400 milliGRAM(s) Oral daily  influenza   Vaccine 0.5 milliLiter(s) IntraMuscular once  levETIRAcetam 500 milliGRAM(s) Oral two times a day  metoprolol succinate  milliGRAM(s) Oral two times a day  simvastatin 20 milliGRAM(s) Oral at bedtime  sodium chloride 0.9%. 1000 milliLiter(s) IV Continuous <Continuous>  tamsulosin 0.4 milliGRAM(s) Oral at bedtime    Labs:  CBC Full  -  ( 30 Sep 2018 11:11 )  WBC Count : 7.97 K/uL  Hemoglobin : 11.4 g/dL  Hematocrit : 33.5 %  Platelet Count - Automated : 217 K/uL  Mean Cell Volume : 83.3 fl  Mean Cell Hemoglobin : 28.4 pg  Mean Cell Hemoglobin Concentration : 34.0 gm/dL  Auto Neutrophil # : x  Auto Lymphocyte # : x  Auto Monocyte # : x  Auto Eosinophil # : x  Auto Basophil # : x  Auto Neutrophil % : x  Auto Lymphocyte % : x  Auto Monocyte % : x  Auto Eosinophil % : x  Auto Basophil % : x    09-30    138  |  104  |  19  ----------------------------<  102<H>  3.8   |  23  |  0.93    Ca    9.9      30 Sep 2018 09:31    Surgical Pathology Report (05.16.18 @ 11:01)    Surgical Pathology Report:   ACCESSION No:  [id number]    JESSICA DOWD                      1        Surgical Final Report          Final Diagnosis  Bladder tumor, right lateral wall, TURBT  -Infiltrating small cell carcinoma.  See note.  -The carcinoma invades muscularis propria (detrusor muscle).    Note: Immunohistochemically, the carcinoma is positive for  synaptophysin and chromogranin. The immunoprofile supports the  above diagnosis.      Specimen(s) Submitted  1     Bladder  tumor right lateral wall        Radiology:     < from: CT Abdomen and Pelvis w/ Oral Cont and w/ IV Cont (09.30.18 @ 17:08) >  COMPARISON: CT scan of the abdomen and pelvis from 4/19/2018 and CT scan   of the chest from 9/26/2018.    < end of copied text >  < from: CT Abdomen and Pelvis w/ Oral Cont and w/ IV Cont (09.30.18 @ 17:08) >  Several nodular opacities at the lung bases which are new since 9/26/2018   and may be related to infection.    Bilobar hepatic metastatic disease.    Interval removal of the right posterior lateral bladder wall mass with   right-sided bladder wall thickening remaining with punctate calcification.    Trace ascites.    < end of copied text >          ROS:  No pain, no fever  No lumps in neck or pain  No Sob or chest pain  No palpitations   No abdominal pain. No change in bowel habit. No blood in stools  No weakness in extremities  No leg swelling      Vital Signs Last 24 Hrs  T(C): 36.7 (01 Oct 2018 07:29), Max: 36.8 (30 Sep 2018 21:32)  T(F): 98.1 (01 Oct 2018 07:29), Max: 98.3 (30 Sep 2018 21:32)  HR: 66 (01 Oct 2018 07:29) (62 - 69)  BP: 169/67 (01 Oct 2018 07:29) (165/62 - 183/74)  BP(mean): --  RR: 18 (01 Oct 2018 07:29) (18 - 18)  SpO2: 96% (01 Oct 2018 07:29) (96% - 100%)    Physical Exam:  Patient comfortable  AXOX3  Neck supple, no LN's  Chest: bilateral breath sounds, no wheeze or rales  CVS: regular heart rate without murmur  Abdomen: soft, BS+, no massess or organomegaly  CNS: no gross deficit  Ext: no edema Patient is a 89y old  Male admitted with complaint of generalized weakness/fever.  PMH of TIA, seizure disorder, A.Fib (s/p pacemaker placement) HTN, HLD, systolic HF, bladder ca, s/p TURBT on 05/16/2018,  and recent radiation therapy who presents to the ED for generalized weakness.   He has history of prostate cancer and had seed implants for them in past.   Patient was seen in May/June 2018. 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 He had hematuria which improved. Determination at that time was made that he was not a candidate for systemic chemotherapy. Radiation can be given as needed for hematuria.  Patient apparently had radiation in [place] and finished about 3 weeks back. He was supposed to get PET/CT later this month.      PAST MEDICAL & SURGICAL HISTORY:  Cancer: prostate/bladder cancer  Pacemaker  HF (heart failure)  Seizure  Prostate cancer  BPH (benign prostatic hypertrophy)  PAF (paroxysmal atrial fibrillation)  TIA (Transient Ischemic Attack)  Prostate Ca: s/p seed implant  Hyperlipemia  HTN (Hypertension)  H/O prostate cancer: seed implant  No Past Surgical History    Meds:  amLODIPine   Tablet 2.5 milliGRAM(s) Oral daily  aspirin enteric coated 81 milliGRAM(s) Oral daily  digoxin     Tablet 0.125 milliGRAM(s) Oral daily  enoxaparin Injectable 40 milliGRAM(s) SubCutaneous daily  fluconAZOLE   Tablet 400 milliGRAM(s) Oral daily  influenza   Vaccine 0.5 milliLiter(s) IntraMuscular once  levETIRAcetam 500 milliGRAM(s) Oral two times a day  metoprolol succinate  milliGRAM(s) Oral two times a day  simvastatin 20 milliGRAM(s) Oral at bedtime  sodium chloride 0.9%. 1000 milliLiter(s) IV Continuous <Continuous>  tamsulosin 0.4 milliGRAM(s) Oral at bedtime    Labs:  CBC Full  -  ( 30 Sep 2018 11:11 )  WBC Count : 7.97 K/uL  Hemoglobin : 11.4 g/dL  Hematocrit : 33.5 %  Platelet Count - Automated : 217 K/uL  Mean Cell Volume : 83.3 fl  Mean Cell Hemoglobin : 28.4 pg  Mean Cell Hemoglobin Concentration : 34.0 gm/dL  Auto Neutrophil # : x  Auto Lymphocyte # : x  Auto Monocyte # : x  Auto Eosinophil # : x  Auto Basophil # : x  Auto Neutrophil % : x  Auto Lymphocyte % : x  Auto Monocyte % : x  Auto Eosinophil % : x  Auto Basophil % : x    09-30    138  |  104  |  19  ----------------------------<  102<H>  3.8   |  23  |  0.93    Ca    9.9      30 Sep 2018 09:31    Surgical Pathology Report (05.16.18 @ 11:01)    Surgical Pathology Report:   ACCESSION No:  [id number]    JESSICA DOWD                      1        Surgical Final Report          Final Diagnosis  Bladder tumor, right lateral wall, TURBT  -Infiltrating small cell carcinoma.  See note.  -The carcinoma invades muscularis propria (detrusor muscle).    Note: Immunohistochemically, the carcinoma is positive for  synaptophysin and chromogranin. The immunoprofile supports the  above diagnosis.      Specimen(s) Submitted  1     Bladder  tumor right lateral wall        Radiology:     < from: CT Abdomen and Pelvis w/ Oral Cont and w/ IV Cont (09.30.18 @ 17:08) >  COMPARISON: CT scan of the abdomen and pelvis from 4/19/2018 and CT scan   of the chest from 9/26/2018.    < end of copied text >  < from: CT Abdomen and Pelvis w/ Oral Cont and w/ IV Cont (09.30.18 @ 17:08) >  Several nodular opacities at the lung bases which are new since 9/26/2018   and may be related to infection.    Bilobar hepatic metastatic disease.    Interval removal of the right posterior lateral bladder wall mass with   right-sided bladder wall thickening remaining with punctate calcification.    Trace ascites.    < end of copied text >          ROS:  No pain, no fever  No lumps in neck or pain  No Sob or chest pain  No palpitations   No abdominal pain. No change in bowel habit. No blood in stools  General weakness but moves all extremities      Vital Signs Last 24 Hrs  T(C): 36.7 (01 Oct 2018 07:29), Max: 36.8 (30 Sep 2018 21:32)  T(F): 98.1 (01 Oct 2018 07:29), Max: 98.3 (30 Sep 2018 21:32)  HR: 66 (01 Oct 2018 07:29) (62 - 69)  BP: 169/67 (01 Oct 2018 07:29) (165/62 - 183/74)  BP(mean): --  RR: 18 (01 Oct 2018 07:29) (18 - 18)  SpO2: 96% (01 Oct 2018 07:29) (96% - 100%)    Physical Exam:  Patient comfortable  AXOX3  Neck supple, no LN's  Chest: bilateral breath sounds, no wheeze or rales  CVS: regular heart rate without murmur  Abdomen: soft, BS+, no massess or organomegaly  CNS: no gross deficit  Ext: no edema

## 2018-10-01 NOTE — PROGRESS NOTE ADULT - SUBJECTIVE AND OBJECTIVE BOX
- Patient seen and examined.  - In summary, patient is a 89 year old man who presented with generalized weakness/fever (27 Sep 2018 07:10)  - Today, patient is without complaints.         *****MEDICATIONS:    MEDICATIONS  (STANDING):  aspirin enteric coated 81 milliGRAM(s) Oral daily  digoxin     Tablet 0.125 milliGRAM(s) Oral daily  enoxaparin Injectable 40 milliGRAM(s) SubCutaneous daily  fluconAZOLE   Tablet 400 milliGRAM(s) Oral daily  influenza   Vaccine 0.5 milliLiter(s) IntraMuscular once  levETIRAcetam 500 milliGRAM(s) Oral two times a day  metoprolol succinate  milliGRAM(s) Oral two times a day  simvastatin 20 milliGRAM(s) Oral at bedtime  sodium chloride 0.9%. 1000 milliLiter(s) (50 mL/Hr) IV Continuous <Continuous>  tamsulosin 0.4 milliGRAM(s) Oral at bedtime    MEDICATIONS  (PRN):             ***** REVIEW OF SYSTEM:  GEN: no fever, no chills, no pain  RESP: no SOB, no cough, no sputum  CVS: no chest pain, no palpitations, no edema  GI: no abdominal pain, no nausea, no vomiting, no constipation, no diarrhea  : no dysuria, no frequency  NEURO: no headache, no dizziness  PSYCH: no depression, not anxious  Derm : no itching, no rash         ***** VITAL SIGNS:    T(F): 98.1 (10-01-18 @ 07:29), Max: 98.3 (18 @ 21:32)  HR: 66 (10-01-18 @ 07:29) (62 - 69)  BP: 169/67 (10-01-18 @ 07:29) (165/62 - 183/74)  RR: 18 (10-01-18 @ 07:29) (18 - 18)  SpO2: 96% (10-01-18 @ 07:29) (96% - 100%)  Wt(kg): --  ,   I&O's Summary    30 Sep 2018 07:01  -  01 Oct 2018 07:00  --------------------------------------------------------  IN: 1539 mL / OUT: 1050 mL / NET: 489 mL    01 Oct 2018 07:01  -  01 Oct 2018 11:00  --------------------------------------------------------  IN: 0 mL / OUT: 200 mL / NET: -200 mL                   *****PHYSICAL EXAM:  GEN: A&O X 3 , NAD , comfortable  HEENT: NCAT, EOMI, MMM, no icterus  NECK: Supple, No JVD  CVS: S1S2 , regular , No M/R/G appreciated  PULM: CTA B/L,  no W/R/R appreciated  ABD.: soft. non tender, non distended,  bowel sounds present  Extrem: intact pulses , no edema noted  Derm: No rash or ecchymosis noted  PSYCH: normal mood, no depression, not anxious         *****LAB AND IMAGIN.4   7.97  )-----------( 217      ( 30 Sep 2018 11:11 )             33.5               09-    138  |  104  |  19  ----------------------------<  102<H>  3.8   |  23  |  0.93    Ca    9.9      30 Sep 2018 09:31      [All pertinent recent Imaging/Reports reviewed]         *****A S S E S S M E N T   A N D   P L A N :    89M with hx of paf, htn, cva, seizure disorder with change of mental status and fever   head ct no acute change  CT chest no PNA  no AC due hx of hematuria sec to bladder ca s/p recent radiation therapy  f/u cultures  abx per ID  dvt prophylaxis  add norvasc    __________________________  DALILA Robertson D.O.

## 2018-10-01 NOTE — PROGRESS NOTE ADULT - ASSESSMENT
pt  with htn, tia,  afib, ppm, cardiomyopathy  , tia,. hld    ca  bladder /  small  cell cancer/    s/p  turbt  s/p  gross hematuria in the past  urology/  dr scott  on dig// asa/keppra/ beta  blocker  now  with  weakness/ dehydration  ct  chest. no pna, ct head, no new  cva  pt with candidemia ,was   on micafungin/ now  on diflucan/  candida   parapsilosis  ct abd/ prelim, no  acute  findings  rpt blood  c/s, negative  thus  far/ will await  incubation     dvt ppx pt  with htn, tia,  afib, ppm, cardiomyopathy  , tia,. hld    ca  bladder /  small  cell cancer/    s/p  turbt  s/p  gross hematuria in the past  urology/  dr scott  on dig// asa/keppra/ beta  blocker  now  with  weakness/ dehydration  ct  chest. no pna, ct head, no new  cva  pt with candidemia ,was   on micafungin/ now  on diflucan/  candida   parapsilosis  rpt blood  c/s, negative  thus  far/ will await  incubation  ct  abdomen, noted,  pulm nodules/ liver  mets  oncology. dr cross      < from: CT Abdomen and Pelvis w/ Oral Cont and w/ IV Cont (09.30.18 @ 17:08) >    IMPRESSION:   Several nodular opacities at the lung bases which are new since 9/26/2018   and may be related to infection.    Bilobar hepatic metastatic disease.  Interval removal of the right posterior lateral bladder wall mass with   right-sided bladder wall thickening remaining with punctate calcification.    Trace ascites.  < end of copied text >

## 2018-10-01 NOTE — PROGRESS NOTE ADULT - ASSESSMENT
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  History of HTN,Afib,SSS, PPM,and TIA.  He had hematuria post TURBT but did not have a documented infection.  He was admitted with lethargy, diarrhea, and failure to thrive.  Yeast in blood is almost never a contaminant and must be assumed to be real, even when isolated in only 1/4 bottles.  Candida parapsolosis isolated .No obvious  focus, his renal function has been stable and his urine culture was negative  He has seen Dr Turner and Arpan, ? if he has received any chemo or RT therapy.  Diarrhea w/u without an infectious cause, negative GI PCR,negative CDT, and negative stool culture.  Hence, cryptogenic focus for fungemia  Suggest:  1.fluconazole to complete 2 weeks of treatment  2.optho exam to exclude endopthalmitis, typically uncommon with non albicans candida  3.Supportive care, repeat blood cultures are negative prior to antifungals at 72 hours.

## 2018-10-01 NOTE — PROGRESS NOTE ADULT - SUBJECTIVE AND OBJECTIVE BOX
CC: f/u for low grade candidemia    Patient reports: no specific complaints, he is voiding okay, diarrhea is better.No fever or chills, no abdominal pain    REVIEW OF SYSTEMS:  All other review of systems negative (Comprehensive ROS)    Antimicrobials Day #  :day 4/14  fluconAZOLE   Tablet 400 milliGRAM(s) Oral daily    Other Medications Reviewed    T(F): 98.1 (10-01-18 @ 07:29), Max: 98.3 (09-30-18 @ 21:32)  HR: 66 (10-01-18 @ 07:29)  BP: 169/67 (10-01-18 @ 07:29)  RR: 18 (10-01-18 @ 07:29)  SpO2: 96% (10-01-18 @ 07:29)  Wt(kg): --    PHYSICAL EXAM:  General: alert, no acute distress  Eyes:  anicteric, no conjunctival injection, no discharge  Oropharynx: no lesions or injection 	  Neck: supple, without adenopathy  Lungs: clear to auscultation  Heart: irregular rate and rhythm; no murmur, rubs or gallops  Abdomen: soft, nondistended, nontender, without mass or organomegaly  Skin: no lesions  Extremities: no clubbing, cyanosis, or edema  Neurologic: alert, oriented, moves all extremities    LAB RESULTS:                        11.4   7.97  )-----------( 217      ( 30 Sep 2018 11:11 )             33.5     09-30    138  |  104  |  19  ----------------------------<  102<H>  3.8   |  23  |  0.93    Ca    9.9      30 Sep 2018 09:31    TPro  6.6  /  Alb  3.2<L>  /  TBili  0.3  /  DBili  x   /  AST  65<H>  /  ALT  34  /  AlkPhos  87  09-29    LIVER FUNCTIONS - ( 29 Sep 2018 09:43 )  Alb: 3.2 g/dL / Pro: 6.6 g/dL / ALK PHOS: 87 U/L / ALT: 34 U/L / AST: 65 U/L / GGT: x             MICROBIOLOGY:  RECENT CULTURES:  09-30 @ 12:07 .Stool Feces     No enteric pathogens to date: Final culture pending    GI PCR panel and CDT negative  09-28 @ 19:10 .Blood Blood-Peripheral     No growth to date.      09-26 @ 17:10 .Urine Clean Catch (Midstream)     No growth      09-26 @ 16:31 .Blood Blood-Peripheral Blood Culture PCR    Growth in aerobic bottle: Candida parapsilosis Susceptibility to follow.      Growth in aerobic bottle:  Yeast like cells        RADIOLOGY REVIEWED:    CT of A/P no acute intraabdominal process, preliminary  < from: US Kidney and Bladder (09.28.18 @ 16:51) >  IMPRESSION:     No hydronephrosis.    Limited evaluation of the urinary bladderwhich is underdistended. A 0.7   cm echogenic focus is noted at the right posterior aspect of the urinary   bladder. The differential diagnosis includes surgical material and   calcification. A bladder mass cannot be excluded.    < end of copied text >

## 2018-10-01 NOTE — PROGRESS NOTE ADULT - SUBJECTIVE AND OBJECTIVE BOX
pt feels better,  has  been ambulating    REVIEW OF SYSTEMS:  GEN: no fever,    no chills  RESP: no SOB,   no cough  CVS: no chest pain,   no palpitations  GI: no abdominal pain,   no nausea,   no vomiting,   no constipation,   no diarrhea  : no dysuria,   no frequency  NEURO: no headache,   no dizziness  PSYCH: no depression,   not anxious  Derm : no rash    MEDICATIONS  (STANDING):  aspirin enteric coated 81 milliGRAM(s) Oral daily  digoxin     Tablet 0.125 milliGRAM(s) Oral daily  enoxaparin Injectable 40 milliGRAM(s) SubCutaneous daily  fluconAZOLE   Tablet 400 milliGRAM(s) Oral daily  influenza   Vaccine 0.5 milliLiter(s) IntraMuscular once  levETIRAcetam 500 milliGRAM(s) Oral two times a day  metoprolol succinate  milliGRAM(s) Oral two times a day  simvastatin 20 milliGRAM(s) Oral at bedtime  sodium chloride 0.9%. 1000 milliLiter(s) (50 mL/Hr) IV Continuous <Continuous>  tamsulosin 0.4 milliGRAM(s) Oral at bedtime    MEDICATIONS  (PRN):      Vital Signs Last 24 Hrs  T(C): 36.7 (01 Oct 2018 07:29), Max: 36.8 (30 Sep 2018 21:32)  T(F): 98.1 (01 Oct 2018 07:29), Max: 98.3 (30 Sep 2018 21:32)  HR: 66 (01 Oct 2018 07:29) (62 - 69)  BP: 169/67 (01 Oct 2018 07:29) (165/62 - 183/74)  BP(mean): --  RR: 18 (01 Oct 2018 07:29) (18 - 18)  SpO2: 96% (01 Oct 2018 07:29) (96% - 100%)  CAPILLARY BLOOD GLUCOSE        I&O's Summary    30 Sep 2018 07:01  -  01 Oct 2018 07:00  --------------------------------------------------------  IN: 1539 mL / OUT: 1050 mL / NET: 489 mL        PHYSICAL EXAM:  HEAD:  Atraumatic, Normocephalic  NECK: Supple, No   JVD  CHEST/LUNG:   no     rales,     no,    rhonchi  HEART: Regular rate and rhythm;         murmur  ABDOMEN: Soft, Nontender, ;   EXTREMITIES:        edema  NEUROLOGY:  alert    LABS:                        11.4   7.97  )-----------( 217      ( 30 Sep 2018 11:11 )             33.5     09-30    138  |  104  |  19  ----------------------------<  102<H>  3.8   |  23  |  0.93    Ca    9.9      30 Sep 2018 09:31    TPro  6.6  /  Alb  3.2<L>  /  TBili  0.3  /  DBili  x   /  AST  65<H>  /  ALT  34  /  AlkPhos  87  09-29                    Thyroid Stimulating Hormone, Serum: 2.67 uIU/mL (09-27 @ 14:14)        GI PCR Panel, Stool (collected 09-30-18 @ 12:07)  Source: .Stool Feces  Final Report (09-30-18 @ 14:01):    GI PCR Results: NOT detected    *******Please Note:*******    GI panel PCR evaluates for:    Campylobacter, Plesiomonas shigelloides, Salmonella,    Vibrio, Yersinia enterocolitica, Enteroaggregative    Escherichia coli (EAEC), Enteropathogenic E.coli (EPEC),    Enterotoxigenic E. coli (ETEC) lt/st, Shiga-like    toxin-producing E. coli (STEC) stx1/stx2,    Shigella/ Enteroinvasive E. coli (EIEC), Cryptosporidium,    Cyclospora cayetanensis, Entamoeba histolytica,    Giardia lamblia, Adenovirus F 40/41, Astrovirus,    Norovirus GI/GII, Rotavirus A, Sapovirus        Consultant(s) Notes Reviewed:      Care Discussed with Consultants/Other Providers:

## 2018-10-01 NOTE — CONSULT NOTE ADULT - REASON FOR ADMISSION
generalized weakness/fever

## 2018-10-02 LAB
-  5-FLUCYTOSINE: SIGNIFICANT CHANGE UP
-  AMPHOTERICIN B: SIGNIFICANT CHANGE UP
-  ANIDULAFUNGIN: SIGNIFICANT CHANGE UP
-  CASPOFUNGIN: SIGNIFICANT CHANGE UP
-  FLUCONAZOLE: SIGNIFICANT CHANGE UP
-  ITRACONAZOLE: SIGNIFICANT CHANGE UP
-  MICAFUNGIN: SIGNIFICANT CHANGE UP
-  POSACONAZOLE: SIGNIFICANT CHANGE UP
-  VORICONAZOLE: SIGNIFICANT CHANGE UP
CULTURE RESULTS: SIGNIFICANT CHANGE UP
CULTURE RESULTS: SIGNIFICANT CHANGE UP
METHOD TYPE: SIGNIFICANT CHANGE UP
ORGANISM # SPEC MICROSCOPIC CNT: SIGNIFICANT CHANGE UP
SPECIMEN SOURCE: SIGNIFICANT CHANGE UP
SPECIMEN SOURCE: SIGNIFICANT CHANGE UP

## 2018-10-02 PROCEDURE — 93306 TTE W/DOPPLER COMPLETE: CPT | Mod: 26

## 2018-10-02 RX ADMIN — FLUCONAZOLE 400 MILLIGRAM(S): 150 TABLET ORAL at 11:22

## 2018-10-02 RX ADMIN — LEVETIRACETAM 500 MILLIGRAM(S): 250 TABLET, FILM COATED ORAL at 18:06

## 2018-10-02 RX ADMIN — TAMSULOSIN HYDROCHLORIDE 0.4 MILLIGRAM(S): 0.4 CAPSULE ORAL at 22:22

## 2018-10-02 RX ADMIN — SIMVASTATIN 20 MILLIGRAM(S): 20 TABLET, FILM COATED ORAL at 22:22

## 2018-10-02 RX ADMIN — SODIUM CHLORIDE 50 MILLILITER(S): 9 INJECTION INTRAMUSCULAR; INTRAVENOUS; SUBCUTANEOUS at 06:39

## 2018-10-02 RX ADMIN — Medication 100 MILLIGRAM(S): at 18:06

## 2018-10-02 RX ADMIN — LEVETIRACETAM 500 MILLIGRAM(S): 250 TABLET, FILM COATED ORAL at 06:38

## 2018-10-02 RX ADMIN — ENOXAPARIN SODIUM 40 MILLIGRAM(S): 100 INJECTION SUBCUTANEOUS at 11:22

## 2018-10-02 RX ADMIN — Medication 0.12 MILLIGRAM(S): at 06:38

## 2018-10-02 RX ADMIN — Medication 100 MILLIGRAM(S): at 06:38

## 2018-10-02 RX ADMIN — AMLODIPINE BESYLATE 2.5 MILLIGRAM(S): 2.5 TABLET ORAL at 06:39

## 2018-10-02 RX ADMIN — Medication 81 MILLIGRAM(S): at 11:21

## 2018-10-02 NOTE — PROGRESS NOTE ADULT - SUBJECTIVE AND OBJECTIVE BOX
- Patient seen and examined.  - In summary, patient is a 89 year old man who presented with generalized weakness/fever (27 Sep 2018 07:10)  - Today, patient is without complaints.         *****MEDICATIONS:    MEDICATIONS  (STANDING):  amLODIPine   Tablet 2.5 milliGRAM(s) Oral daily  aspirin enteric coated 81 milliGRAM(s) Oral daily  digoxin     Tablet 0.125 milliGRAM(s) Oral daily  enoxaparin Injectable 40 milliGRAM(s) SubCutaneous daily  fluconAZOLE   Tablet 400 milliGRAM(s) Oral daily  influenza   Vaccine 0.5 milliLiter(s) IntraMuscular once  levETIRAcetam 500 milliGRAM(s) Oral two times a day  metoprolol succinate  milliGRAM(s) Oral two times a day  simvastatin 20 milliGRAM(s) Oral at bedtime  sodium chloride 0.9%. 1000 milliLiter(s) (50 mL/Hr) IV Continuous <Continuous>  tamsulosin 0.4 milliGRAM(s) Oral at bedtime    MEDICATIONS  (PRN):             ***** REVIEW OF SYSTEM:  GEN: no fever, no chills, no pain  RESP: no SOB, no cough, no sputum  CVS: no chest pain, no palpitations, no edema  GI: no abdominal pain, no nausea, no vomiting, no constipation, no diarrhea  : no dysuria, no frequency  NEURO: no headache, no dizziness  PSYCH: no depression, not anxious  Derm : no itching, no rash         ***** VITAL SIGNS:    T(F): 97.9 (10-02-18 @ 06:35), Max: 98.2 (10-01-18 @ 15:12)  HR: 63 (10-02-18 @ 06:35) (62 - 66)  BP: 165/68 (10-02-18 @ 06:35) (161/68 - 179/71)  RR: 18 (10-02-18 @ 06:35) (18 - 18)  SpO2: 95% (10-02-18 @ 06:35) (95% - 97%)  Wt(kg): --  ,   I&O's Summary    01 Oct 2018 07:01  -  02 Oct 2018 07:00  --------------------------------------------------------  IN: 1299 mL / OUT: 200 mL / NET: 1099 mL             *****PHYSICAL EXAM:  GEN: A&O X 3 , NAD , comfortable  HEENT: NCAT, EOMI, MMM, no icterus  NECK: Supple, No JVD  CVS: S1S2 , regular , No M/R/G appreciated  PULM: CTA B/L,  no W/R/R appreciated  ABD.: soft. non tender, non distended,  bowel sounds present  Extrem: intact pulses , no edema noted  Derm: No rash or ecchymosis noted  PSYCH: normal mood, no depression, not anxious         *****LAB AND IMAGIN.3   7.8   )-----------( 209      ( 01 Oct 2018 14:43 )             32.1               10-    136  |  101  |  12  ----------------------------<  121<H>  3.6   |  25  |  0.75    Ca    9.7      01 Oct 2018 14:42                                           [All pertinent recent Imaging/Reports reviewed]         *****A S S E S S M E N T   A N D   P L A N :    89M with hx of paf, htn, cva, seizure disorder with change of mental status and fever   head ct no acute change  CT chest no PNA  no AC due hx of hematuria sec to bladder ca s/p recent radiation therapy  f/u cultures  abx per ID  dvt prophylaxis  add norvasc  f/u oncology  __________________________  DALILA Robertson D.O.

## 2018-10-02 NOTE — PROGRESS NOTE ADULT - SUBJECTIVE AND OBJECTIVE BOX
no comaplints    REVIEW OF SYSTEMS:  GEN: no fever,    no chills  RESP: no SOB,   no cough  CVS: no chest pain,   no palpitations  GI: no abdominal pain,   no nausea,   no vomiting,   no constipation,   no diarrhea  : no dysuria,   no frequency  NEURO: no headache,   no dizziness  PSYCH: no depression,   not anxious  Derm : no rash    MEDICATIONS  (STANDING):  amLODIPine   Tablet 2.5 milliGRAM(s) Oral daily  aspirin enteric coated 81 milliGRAM(s) Oral daily  digoxin     Tablet 0.125 milliGRAM(s) Oral daily  enoxaparin Injectable 40 milliGRAM(s) SubCutaneous daily  fluconAZOLE   Tablet 400 milliGRAM(s) Oral daily  influenza   Vaccine 0.5 milliLiter(s) IntraMuscular once  levETIRAcetam 500 milliGRAM(s) Oral two times a day  metoprolol succinate  milliGRAM(s) Oral two times a day  simvastatin 20 milliGRAM(s) Oral at bedtime  sodium chloride 0.9%. 1000 milliLiter(s) (50 mL/Hr) IV Continuous <Continuous>  tamsulosin 0.4 milliGRAM(s) Oral at bedtime    MEDICATIONS  (PRN):      Vital Signs Last 24 Hrs  T(C): 36.6 (02 Oct 2018 06:35), Max: 36.8 (01 Oct 2018 15:12)  T(F): 97.9 (02 Oct 2018 06:35), Max: 98.2 (01 Oct 2018 15:12)  HR: 63 (02 Oct 2018 06:35) (62 - 66)  BP: 165/68 (02 Oct 2018 06:35) (161/68 - 179/71)  BP(mean): --  RR: 18 (02 Oct 2018 06:35) (18 - 18)  SpO2: 95% (02 Oct 2018 06:35) (95% - 97%)  CAPILLARY BLOOD GLUCOSE        I&O's Summary    01 Oct 2018 07:01  -  02 Oct 2018 07:00  --------------------------------------------------------  IN: 1299 mL / OUT: 200 mL / NET: 1099 mL        PHYSICAL EXAM:  HEAD:  Atraumatic, Normocephalic  NECK: Supple, No   JVD  CHEST/LUNG:   no     rales,     no,    rhonchi  HEART: Regular rate and rhythm;         murmur  ABDOMEN: Soft, Nontender, ;   EXTREMITIES:        edema  NEUROLOGY:  alert    LABS:                        11.3   7.8   )-----------( 209      ( 01 Oct 2018 14:43 )             32.1     10-01    136  |  101  |  12  ----------------------------<  121<H>  3.6   |  25  |  0.75    Ca    9.7      01 Oct 2018 14:42                      Thyroid Stimulating Hormone, Serum: 2.67 uIU/mL (09-27 @ 14:14)          Consultant(s) Notes Reviewed:      Care Discussed with Consultants/Other Providers:

## 2018-10-02 NOTE — PROGRESS NOTE ADULT - ASSESSMENT
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  History of HTN,Afib,SSS, PPM,and TIA.  He had hematuria post TURBT but did not have a documented infection.  S/P radiation therapy in the recent past, ? to bladder  He was admitted with lethargy, diarrhea, and failure to thrive.  Yeast in blood is almost never a contaminant and must be assumed to be real, even when isolated in only 1/4 bottles.  Candida parapsolosis isolated .No obvious  focus, his renal function has been stable and his urine culture was negative  He has seen Dr Turner last admission as well as this admission,not advising chemo this admission  Diarrhea w/u without an infectious cause, negative GI PCR,negative CDT, and negative stool culture.  Hence, cryptogenic focus for fungemia, however well controlled.  Optho exam is negative for endopthalmitis, suspect CT findings reflect malignancy and not infection.  Suggest:  1.fluconazole to complete 2 weeks of treatment  2.Supportive care, discharge planning per primary team  3.His failure to thrive may reflect underlying malignancy

## 2018-10-02 NOTE — PROGRESS NOTE ADULT - SUBJECTIVE AND OBJECTIVE BOX
CC: f/u for candidemia.    Patient reports: no specific complaints, wife is at bedside, still feels he is weak and intermittently confused    REVIEW OF SYSTEMS:  All other review of systems negative (Comprehensive ROS)    Antimicrobials Day #  :day 5/14  fluconAZOLE   Tablet 400 milliGRAM(s) Oral daily    Other Medications Reviewed    T(F): 97.9 (10-02-18 @ 06:35), Max: 98.2 (10-01-18 @ 15:12)  HR: 63 (10-02-18 @ 06:35)  BP: 165/68 (10-02-18 @ 06:35)  RR: 18 (10-02-18 @ 06:35)  SpO2: 95% (10-02-18 @ 06:35)  Wt(kg): --    PHYSICAL EXAM:  General: alert, no acute distress  Eyes:  anicteric, no conjunctival injection, no discharge  Oropharynx: no lesions or injection 	  Neck: supple, without adenopathy  Lungs: clear to auscultation  Heart: regular rate and rhythm; no murmur, rubs or gallops  Abdomen: soft, nondistended, nontender, without mass or organomegaly  Skin: no lesions  Extremities: no clubbing, cyanosis, +edema  Neurologic: alert, oriented, moves all extremities, cooperative    LAB RESULTS:                        11.3   7.8   )-----------( 209      ( 01 Oct 2018 14:43 )             32.1     10-01    136  |  101  |  12  ----------------------------<  121<H>  3.6   |  25  |  0.75    Ca    9.7      01 Oct 2018 14:42          MICROBIOLOGY:  RECENT CULTURES:  09-30 @ 12:07 .Stool Feces     No enteric pathogens isolated.  (Stool culture examined for Salmonella,  Shigella, Campylobacter, Aeromonas, Plesiomonas,  Vibrio, E.coli O157 and Yersinia)      09-28 @ 19:10 .Blood Blood-Peripheral     No growth to date.          RADIOLOGY REVIEWED:    < from: US Abdomen Complete (10.01.18 @ 11:22) >  IMPRESSION:     Coarsened and heterogeneous echotexture of the liver with multiple   hypoechoic lesions, as seen on recent CT abdomen and pelvis 9/30/2018.    No hydronephrosis. Unchanged 0.9 cm echogenic focus in the right   posterior bladder wall. Underdistention of the bladder limits evaluation   for bladder mass.    < end of copied text >  < from: CT Abdomen and Pelvis w/ Oral Cont and w/ IV Cont (09.30.18 @ 17:08) >  IMPRESSION:   Several nodular opacities at the lung bases which are new since 9/26/2018   and may be related to infection.    Bilobar hepatic metastatic disease.    Interval removal of the right posterior lateral bladder wall mass with   right-sided bladder wall thickening remaining with punctate calcification.    Trace ascites.    These findings were discussed with ADELA Zhou at 10/1/2018 8:35 AM by   Dr. Rosa with read back.    < end of copied text >

## 2018-10-02 NOTE — PROGRESS NOTE ADULT - SUBJECTIVE AND OBJECTIVE BOX
Patient is a 89y old  Male admitted with complaint of generalized weakness/fever.  PMH of TIA, seizure disorder, A.Fib (s/p pacemaker placement) HTN, HLD, systolic HF, bladder ca, s/p TURBT on 05/16/2018,  and recent radiation therapy who presents to the ED for generalized weakness.   He has history of prostate cancer and had seed implants for them in past.   Patient was seen in May/June 2018. 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 He had hematuria which improved. Determination at that time was made that he was not a candidate for systemic chemotherapy. Radiation can be given as needed for hematuria.  Patient apparently had radiation in [place] and finished about 3 weeks back. He was supposed to get PET/CT later this month.        PAST MEDICAL & SURGICAL HISTORY:  Cancer: prostate/bladder cancer  Pacemaker  HF (heart failure)  Seizure  Prostate cancer  BPH (benign prostatic hypertrophy)  PAF (paroxysmal atrial fibrillation)  TIA (Transient Ischemic Attack)  Prostate Ca: s/p seed implant  Hyperlipemia  HTN (Hypertension)  H/O prostate cancer: seed implant  No Past Surgical History    Medications:  amLODIPine   Tablet 2.5 milliGRAM(s) Oral daily  aspirin enteric coated 81 milliGRAM(s) Oral daily  digoxin     Tablet 0.125 milliGRAM(s) Oral daily  enoxaparin Injectable 40 milliGRAM(s) SubCutaneous daily  fluconAZOLE   Tablet 400 milliGRAM(s) Oral daily  influenza   Vaccine 0.5 milliLiter(s) IntraMuscular once  levETIRAcetam 500 milliGRAM(s) Oral two times a day  metoprolol succinate  milliGRAM(s) Oral two times a day  simvastatin 20 milliGRAM(s) Oral at bedtime  sodium chloride 0.9%. 1000 milliLiter(s) IV Continuous <Continuous>  tamsulosin 0.4 milliGRAM(s) Oral at bedtime    Labs:  CBC Full  -  ( 01 Oct 2018 14:43 )  WBC Count : 7.8 K/uL  Hemoglobin : 11.3 g/dL  Hematocrit : 32.1 %  Platelet Count - Automated : 209 K/uL  Mean Cell Volume : 84.6 fl  Mean Cell Hemoglobin : 29.7 pg  Mean Cell Hemoglobin Concentration : 35.1 gm/dL  Auto Neutrophil # : x  Auto Lymphocyte # : x  Auto Monocyte # : x  Auto Eosinophil # : x  Auto Basophil # : x  Auto Neutrophil % : x  Auto Lymphocyte % : x  Auto Monocyte % : x  Auto Eosinophil % : x  Auto Basophil % : x    10-01    136  |  101  |  12  ----------------------------<  121<H>  3.6   |  25  |  0.75    Ca    9.7      01 Oct 2018 14:42        Radiology:     EEG noted        ROS:  Patient comfortable without distress  No SOB or chest pain  No palpitation  No abdominal pain, diarrhaea or constipation  No weakness of extremities  No skin changes or swelling of legs    Vital Signs Last 24 Hrs  T(C): 36.6 (02 Oct 2018 06:35), Max: 36.8 (01 Oct 2018 15:12)  T(F): 97.9 (02 Oct 2018 06:35), Max: 98.2 (01 Oct 2018 15:12)  HR: 63 (02 Oct 2018 06:35) (62 - 63)  BP: 165/68 (02 Oct 2018 06:35) (165/68 - 179/71)  BP(mean): --  RR: 18 (02 Oct 2018 06:35) (18 - 18)  SpO2: 95% (02 Oct 2018 06:35) (95% - 97%)    Physical exam:  Patient alert and oriented  No distress  CVS: S1, S2 regular or murmur  Chest: bilateral breath sound without rales  Abdomen: soft, not tender, no organomegaly or masses  No focal neuro deficit  No edema      Assessment and Plan:

## 2018-10-02 NOTE — PROGRESS NOTE ADULT - ASSESSMENT
Patient is a 89y old  Male admitted with complaint of generalized weakness/fever. He has multiple comorbidities.  He has history of prostate cancer and had seed implants for them in past.   Patient was seen in May/June 2018. 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 He had hematuria which improved. Patient  had radiation in [place] and finished about 3 weeks back.   At present he has metastatic disease. I do not think that he is a candidate for chemotherapy given the comorbidities and his age. This was  the impression in May/June 2018 too, family also did not wish him to get chemotherapy.  I spoke to patient's wife in detail again at NP request. Wife wanted to know if he should go thru PET/CT.  I explained to her at length that her  already had CT's which show disease in liver. Since chemotherapy is not planned, PET/CT is not needed. Since she was worried about not following instructions of her 's doctor, I told her to talk to him and send him CT result.   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  I still feel that palliative care should be involved

## 2018-10-02 NOTE — PROGRESS NOTE ADULT - ASSESSMENT
pt  with htn, tia,  afib, ppm, cardiomyopathy  , tia,. hld    ca  bladder /  small  cell cancer/    s/p  turbt  s/p  gross hematuria in the past  urology/  dr scott  on dig// asa/keppra/ beta  blocker  now  with  weakness/ dehydration  ct  chest. no pna, ct head, no new  cva  pt with candidemia ,was   on micafungin/ now  on diflucan/  candida   parapsilosis  rpt blood  c/s, negative  thus  far/ will await  incubation  ct  abdomen, noted,  pulm nodules/ liver  mets  oncology.  eval  seen      < from: CT Abdomen and Pelvis w/ Oral Cont and w/ IV Cont (09.30.18 @ 17:08) >    IMPRESSION:   Several nodular opacities at the lung bases which are new since 9/26/2018   and may be related to infection.    Bilobar hepatic metastatic disease.  Interval removal of the right posterior lateral bladder wall mass with   right-sided bladder wall thickening remaining with punctate calcification.    Trace ascites.  < end of copied text >

## 2018-10-02 NOTE — EEG REPORT - NS EEG TEXT BOX
[facility]   COMPREHENSIVE EPILEPSY CENTER   REPORT OF ROUTINE VIDEO EEG     [facility]: [address] [place] [address] [address], [place] [address], Ph#: [phone]  [facility]: [id number], [place] [address], Ph#: [phone]  Office: [address] [place] [address], [facility] [address], [place] [address] Ph#: [phone]    Patient Name: JESSICA DOWD  Age and : 89y ([birth year])  MRN #: [id number]  Location: [address]   Referring Physician: Clarke Trejo    Study Date: 10-02-18    _____________________________________________________________  TECHNICAL INFORMATION    Placement and Labeling of Electrodes:  The EEG was performed utilizing 20 channels referential EEG connections (coronal over temporal over parasagittal montage) using all standard 10-20 electrode placements with EKG.  Recording was at a sampling rate of 256 samples per second per channel.  Time synchronized digital video recording was done simultaneously with EEG recording.  A low light infrared camera was used for low light recording.  Eliezer and seizure detection algorithms were utilized.    _____________________________________________________________  HISTORY    Patient is a 89y old  Male who presents with a chief complaint of generalized weakness/fever (02 Oct 2018 07:01)    PERTINENT MEDICATION:  levETIRAcetam 500 milliGRAM(s) Oral two times a day    _____________________________________________________________  STUDY INTERPRETATION    Findings: The background was continuous, spontaneously variable and reactive. During wakefulness, the posterior dominant rhythm consisted of symmetric, well-modulated 7 Hz activity, with amplitude to 30 uV, that attenuated to eye opening.  Low amplitude frontal beta was noted in wakefulness.    Background Slowing:  Diffuse theta and polymorphic delta slowing.    Focal Slowing:   None was present.    Sleep Background:  Drowsiness was characterized by fragmentation, attenuation, and slowing of the background activity.    Stage II sleep transients were not recorded.    Other Non-Epileptiform Findings:  None were present.    Interictal Epileptiform Activity:   None were present.    Events:  Clinical events: None recorded.  Seizures: None recorded.    Activation Procedures:   Hyperventilation was not performed.    Photic stimulation was not performed.     Artifacts:  Intermittent myogenic and movement artifacts were noted.    ECG:  The heart rate on single channel ECG was predominantly between 60-70BPM.    _____________________________________________________________  EEG SUMMARY/CLASSIFICATION  Abnormal EEG in the awake and drowsy states.  - Mild generalized slowing.  _____________________________________________________________  EEG IMPRESSION/CLINICAL CORRELATE    Abnormal EEG study.  1. Mild nonspecific diffuse or multifocal cerebral dysfunction.   2. No epileptiform pattern or seizure seen.    Preliminary Fellow Read:    Marie Bang MD  EEG Fellow, [facility]   COMPREHENSIVE EPILEPSY CENTER   REPORT OF ROUTINE VIDEO EEG     [facility]: [address] [place] [address] [address], [place] [address], Ph#: [phone]  [facility]: [id number], [place] [address], Ph#: [phone]  Office: [address] [place] [address], [facility] [address], [place] [address] Ph#: [phone]    Patient Name: JESSICA DOWD  Age and : 89y ([birth year])  MRN #: [id number]  Location: [address]   Referring Physician: Clarke Trejo    Study Date: 10-02-18    _____________________________________________________________  TECHNICAL INFORMATION    Placement and Labeling of Electrodes:  The EEG was performed utilizing 20 channels referential EEG connections (coronal over temporal over parasagittal montage) using all standard 10-20 electrode placements with EKG.  Recording was at a sampling rate of 256 samples per second per channel.  Time synchronized digital video recording was done simultaneously with EEG recording.  A low light infrared camera was used for low light recording.  Eliezer and seizure detection algorithms were utilized.    _____________________________________________________________  HISTORY    Patient is a 89y old  Male who presents with a chief complaint of generalized weakness/fever (02 Oct 2018 07:01)    PERTINENT MEDICATION:  levETIRAcetam 500 milliGRAM(s) Oral two times a day    _____________________________________________________________  STUDY INTERPRETATION    Findings: The background was continuous, spontaneously variable and reactive. During wakefulness, the posterior dominant rhythm consisted of symmetric, well-modulated 7 Hz activity, with amplitude to 30 uV, that attenuated to eye opening.  Low amplitude frontal beta was noted in wakefulness.    Background Slowing:  Diffuse theta and polymorphic delta slowing.    Focal Slowing:   None was present.    Sleep Background:  Drowsiness was characterized by fragmentation, attenuation, and slowing of the background activity.    Stage II sleep transients were not recorded.    Other Non-Epileptiform Findings:  None were present.    Interictal Epileptiform Activity:   None were present.    Events:  Clinical events: None recorded.  Seizures: None recorded.    Activation Procedures:   Hyperventilation was not performed.    Photic stimulation was not performed.     Artifacts:  Intermittent myogenic and movement artifacts were noted.    ECG:  The heart rate on single channel ECG was predominantly between 60-70BPM.    _____________________________________________________________  EEG SUMMARY/CLASSIFICATION  Abnormal EEG in the awake and drowsy states.  - Mild generalized slowing.  _____________________________________________________________  EEG IMPRESSION/CLINICAL CORRELATE    Abnormal EEG study.  1. Mild nonspecific diffuse or multifocal cerebral dysfunction.   2. No epileptiform pattern or seizure seen.    Marie Bang MD  EEG Fellow, [facility]    Adalberto Egan

## 2018-10-03 ENCOUNTER — TRANSCRIPTION ENCOUNTER (OUTPATIENT)
Age: 83
End: 2018-10-03

## 2018-10-03 VITALS
OXYGEN SATURATION: 96 % | SYSTOLIC BLOOD PRESSURE: 160 MMHG | RESPIRATION RATE: 18 BRPM | DIASTOLIC BLOOD PRESSURE: 60 MMHG | HEART RATE: 66 BPM

## 2018-10-03 LAB
CULTURE RESULTS: SIGNIFICANT CHANGE UP
SPECIMEN SOURCE: SIGNIFICANT CHANGE UP

## 2018-10-03 RX ORDER — METOPROLOL TARTRATE 50 MG
1 TABLET ORAL
Qty: 0 | Refills: 0 | COMMUNITY
Start: 2018-10-03

## 2018-10-03 RX ORDER — FLUCONAZOLE 150 MG/1
2 TABLET ORAL
Qty: 0 | Refills: 0 | COMMUNITY
Start: 2018-10-03

## 2018-10-03 RX ORDER — MIRTAZAPINE 45 MG/1
1 TABLET, ORALLY DISINTEGRATING ORAL
Qty: 0 | Refills: 0 | COMMUNITY

## 2018-10-03 RX ORDER — ASPIRIN/CALCIUM CARB/MAGNESIUM 324 MG
1 TABLET ORAL
Qty: 0 | Refills: 0 | COMMUNITY
Start: 2018-10-03

## 2018-10-03 RX ORDER — AMLODIPINE BESYLATE 2.5 MG/1
1 TABLET ORAL
Qty: 0 | Refills: 0 | COMMUNITY
Start: 2018-10-03

## 2018-10-03 RX ORDER — AMLODIPINE BESYLATE 2.5 MG/1
5 TABLET ORAL DAILY
Qty: 0 | Refills: 0 | Status: DISCONTINUED | OUTPATIENT
Start: 2018-10-03 | End: 2018-10-03

## 2018-10-03 RX ORDER — ENOXAPARIN SODIUM 100 MG/ML
40 INJECTION SUBCUTANEOUS
Qty: 0 | Refills: 0 | COMMUNITY
Start: 2018-10-03

## 2018-10-03 RX ADMIN — LEVETIRACETAM 500 MILLIGRAM(S): 250 TABLET, FILM COATED ORAL at 17:23

## 2018-10-03 RX ADMIN — FLUCONAZOLE 400 MILLIGRAM(S): 150 TABLET ORAL at 11:44

## 2018-10-03 RX ADMIN — ENOXAPARIN SODIUM 40 MILLIGRAM(S): 100 INJECTION SUBCUTANEOUS at 12:24

## 2018-10-03 RX ADMIN — LEVETIRACETAM 500 MILLIGRAM(S): 250 TABLET, FILM COATED ORAL at 06:18

## 2018-10-03 RX ADMIN — AMLODIPINE BESYLATE 2.5 MILLIGRAM(S): 2.5 TABLET ORAL at 06:18

## 2018-10-03 RX ADMIN — Medication 100 MILLIGRAM(S): at 06:18

## 2018-10-03 RX ADMIN — Medication 100 MILLIGRAM(S): at 17:23

## 2018-10-03 RX ADMIN — Medication 0.12 MILLIGRAM(S): at 06:18

## 2018-10-03 RX ADMIN — AMLODIPINE BESYLATE 5 MILLIGRAM(S): 2.5 TABLET ORAL at 12:24

## 2018-10-03 RX ADMIN — SODIUM CHLORIDE 50 MILLILITER(S): 9 INJECTION INTRAMUSCULAR; INTRAVENOUS; SUBCUTANEOUS at 14:19

## 2018-10-03 RX ADMIN — Medication 81 MILLIGRAM(S): at 11:44

## 2018-10-03 NOTE — PROGRESS NOTE ADULT - SUBJECTIVE AND OBJECTIVE BOX
HPI:  CHIEF COMPLAINT:Patient is a 89y old  Male who presents with a chief complaint of generalized weakness/fever.    HPI Objective Statement: 89 y/o male PMH of TIA, seizure disorder, A.Fib (s/p pacemaker placement) HTN, HLD, systolic HF, bladder ca, s/p TURBT on 05/16/2018 and recent radiation therapy who presents to the ED for generalized weakness. patient reports feeling weak and fatigued for 4 days. a few days of loose stools, no recent abx. last hospitalization is in july. tmax at home was 99. no cough or uri symptoms. no abd pain, n/v. no dysuria, but does have sensation of incomplete emptying. has not been eating or drinking for days and has been too weak to stand.      PHYSICAL EXAM:  T(C): 37 (09-26-18 @ 15:27), Max: 38.6 (09-26-18 @ 13:20)  HR: 61 (09-26-18 @ 15:27) (61 - 76)  BP: 152/59 (09-26-18 @ 15:27) (152/59 - 190/76)  RR: 23 (09-26-18 @ 15:27) (18 - 24)  SpO2: 98% (09-26-18 @ 15:27) (96% - 100%)    PAST MEDICAL & SURGICAL HISTORY:  Cancer: prostate/bladder cancer  Pacemaker  HF (heart failure)  Seizure  Prostate cancer  BPH (benign prostatic hypertrophy)  PAF (paroxysmal atrial fibrillation)  TIA (Transient Ischemic Attack)  Prostate Ca: s/p seed implant  Hyperlipemia  HTN (Hypertension)  H/O prostate cancer: seed implant  No Past Surgical History    Medications:  amLODIPine   Tablet 2.5 milliGRAM(s) Oral daily  aspirin enteric coated 81 milliGRAM(s) Oral daily  digoxin     Tablet 0.125 milliGRAM(s) Oral daily  enoxaparin Injectable 40 milliGRAM(s) SubCutaneous daily  fluconAZOLE   Tablet 400 milliGRAM(s) Oral daily  influenza   Vaccine 0.5 milliLiter(s) IntraMuscular once  levETIRAcetam 500 milliGRAM(s) Oral two times a day  metoprolol succinate  milliGRAM(s) Oral two times a day  simvastatin 20 milliGRAM(s) Oral at bedtime  sodium chloride 0.9%. 1000 milliLiter(s) IV Continuous <Continuous>  tamsulosin 0.4 milliGRAM(s) Oral at bedtime    Labs:  CBC Full  -  ( 01 Oct 2018 14:43 )  WBC Count : 7.8 K/uL  Hemoglobin : 11.3 g/dL  Hematocrit : 32.1 %  Platelet Count - Automated : 209 K/uL  Mean Cell Volume : 84.6 fl  Mean Cell Hemoglobin : 29.7 pg  Mean Cell Hemoglobin Concentration : 35.1 gm/dL  Auto Neutrophil # : x  Auto Lymphocyte # : x  Auto Monocyte # : x  Auto Eosinophil # : x  Auto Basophil # : x  Auto Neutrophil % : x  Auto Lymphocyte % : x  Auto Monocyte % : x  Auto Eosinophil % : x  Auto Basophil % : x    10-01    136  |  101  |  12  ----------------------------<  121<H>  3.6   |  25  |  0.75    Ca    9.7      01 Oct 2018 14:42        Radiology:             ROS:  Patient comfortable without distress  No SOB or chest pain  No palpitation  No abdominal pain, diarrhaea or constipation  No weakness of extremities  No skin changes or swelling of legs    Vital Signs Last 24 Hrs  T(C): 37.1 (03 Oct 2018 06:14), Max: 37.2 (02 Oct 2018 15:29)  T(F): 98.8 (03 Oct 2018 06:14), Max: 99 (02 Oct 2018 15:29)  HR: 66 (03 Oct 2018 06:14) (64 - 66)  BP: 161/64 (03 Oct 2018 06:14) (132/60 - 174/69)  BP(mean): --  RR: 18 (03 Oct 2018 06:14) (18 - 18)  SpO2: 96% (03 Oct 2018 06:14) (93% - 97%)    Physical exam:  Patient alert and oriented  No distress  CVS: S1, S2 regular or murmur  Chest: bilateral breath sound without rales  Abdomen: soft, not tender, no organomegaly or masses  No focal neuro deficit  No edema      Assessment and Plan: Patient is a 89y old  Male admitted with complaint of generalized weakness/fever.  PMH of TIA, seizure disorder, A.Fib (s/p pacemaker placement) HTN, HLD, systolic HF, bladder ca, s/p TURBT on 05/16/2018,  and recent radiation therapy who presents to the ED for generalized weakness.   He has history of prostate cancer and had seed implants for them in past.   Patient was seen in May/June 2018. 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 He had hematuria which improved. Determination at that time was made that he was not a candidate for systemic chemotherapy. Radiation can be given as needed for hematuria.  Patient apparently had radiation in [place] and finished about 3 weeks back. He was supposed to get PET/CT later this month.          PHYSICAL EXAM:  T(C): 37 (09-26-18 @ 15:27), Max: 38.6 (09-26-18 @ 13:20)  HR: 61 (09-26-18 @ 15:27) (61 - 76)  BP: 152/59 (09-26-18 @ 15:27) (152/59 - 190/76)  RR: 23 (09-26-18 @ 15:27) (18 - 24)  SpO2: 98% (09-26-18 @ 15:27) (96% - 100%)    PAST MEDICAL & SURGICAL HISTORY:  Cancer: prostate/bladder cancer  Pacemaker  HF (heart failure)  Seizure  Prostate cancer  BPH (benign prostatic hypertrophy)  PAF (paroxysmal atrial fibrillation)  TIA (Transient Ischemic Attack)  Prostate Ca: s/p seed implant  Hyperlipemia  HTN (Hypertension)  H/O prostate cancer: seed implant  No Past Surgical History    Medications:  amLODIPine   Tablet 2.5 milliGRAM(s) Oral daily  aspirin enteric coated 81 milliGRAM(s) Oral daily  digoxin     Tablet 0.125 milliGRAM(s) Oral daily  enoxaparin Injectable 40 milliGRAM(s) SubCutaneous daily  fluconAZOLE   Tablet 400 milliGRAM(s) Oral daily  influenza   Vaccine 0.5 milliLiter(s) IntraMuscular once  levETIRAcetam 500 milliGRAM(s) Oral two times a day  metoprolol succinate  milliGRAM(s) Oral two times a day  simvastatin 20 milliGRAM(s) Oral at bedtime  sodium chloride 0.9%. 1000 milliLiter(s) IV Continuous <Continuous>  tamsulosin 0.4 milliGRAM(s) Oral at bedtime    Labs:  CBC Full  -  ( 01 Oct 2018 14:43 )  WBC Count : 7.8 K/uL  Hemoglobin : 11.3 g/dL  Hematocrit : 32.1 %  Platelet Count - Automated : 209 K/uL  Mean Cell Volume : 84.6 fl  Mean Cell Hemoglobin : 29.7 pg  Mean Cell Hemoglobin Concentration : 35.1 gm/dL  Auto Neutrophil # : x  Auto Lymphocyte # : x  Auto Monocyte # : x  Auto Eosinophil # : x  Auto Basophil # : x  Auto Neutrophil % : x  Auto Lymphocyte % : x  Auto Monocyte % : x  Auto Eosinophil % : x  Auto Basophil % : x    10-01    136  |  101  |  12  ----------------------------<  121<H>  3.6   |  25  |  0.75    Ca    9.7      01 Oct 2018 14:42        Radiology:             ROS:  Patient comfortable without distress  No SOB or chest pain  No palpitation  No abdominal pain, diarrhaea or constipation  No weakness of extremities  No skin changes or swelling of legs    Vital Signs Last 24 Hrs  T(C): 37.1 (03 Oct 2018 06:14), Max: 37.2 (02 Oct 2018 15:29)  T(F): 98.8 (03 Oct 2018 06:14), Max: 99 (02 Oct 2018 15:29)  HR: 66 (03 Oct 2018 06:14) (64 - 66)  BP: 161/64 (03 Oct 2018 06:14) (132/60 - 174/69)  BP(mean): --  RR: 18 (03 Oct 2018 06:14) (18 - 18)  SpO2: 96% (03 Oct 2018 06:14) (93% - 97%)    Physical exam:  Patient alert and oriented  No distress  CVS: S1, S2 regular or murmur  Chest: bilateral breath sound without rales  Abdomen: soft, not tender, no organomegaly or masses  No focal neuro deficit  No edema      Assessment and Plan:

## 2018-10-03 NOTE — DISCHARGE NOTE ADULT - MEDICATION SUMMARY - MEDICATIONS TO TAKE
I will START or STAY ON the medications listed below when I get home from the hospital:    aspirin 81 mg oral delayed release tablet  -- 1 tab(s) by mouth once a day  -- Indication: For PPX    tamsulosin 0.4 mg oral capsule  -- 1 cap(s) by mouth once a day (at bedtime)  -- Indication: For CV agent    digoxin 125 mcg (0.125 mg) oral tablet  -- 1 tab(s) by mouth once a day  -- Indication: For AFib    enoxaparin  -- 40 milligram(s) subcutaneous once a day  -- Indication: For PPX    levETIRAcetam 500 mg oral tablet  -- 1 tab(s) by mouth 2 times a day  -- Indication: For Seizure disorder    fluconazole 200 mg oral tablet  -- 2 tab(s) by mouth once a day until 10/10/18  -- Indication: For Fungemia    simvastatin 20 mg oral tablet  -- 1 tab(s) by mouth once a day (at bedtime)  -- Indication: For Antilipid    metoprolol succinate 100 mg oral tablet, extended release  -- 1 tab(s) by mouth 2 times a day  -- Indication: For HTN    amLODIPine 5 mg oral tablet  -- 1 tab(s) by mouth once a day  -- Indication: For HTN

## 2018-10-03 NOTE — DISCHARGE NOTE ADULT - PLAN OF CARE
Pt seen by infectious disease and found Admission blood cultures with yeast in 1/4 bottles  Candida parapsolosis isolated. No obvious  focus, his renal function has been stable and his urine culture was negative  Diarrhea w/u without an infectious cause, negative GI PCR, negative CDT, and negative stool culture.  Hence, cryptogenic focus for fungemia, however well controlled.  Optho exam is negative for endopthalmitis, suspect CT findings reflect malignancy and not infection.  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 Found not to be a candidate for chemotherapy given the comorbidities and his age. This was  the impression in May/June 2018 too, family also did not wish him to get chemotherapy.  Dr. Turner (Oncologist) spoke to patient at wife's request and explained to her at length to her  that his disease has spread to liver. He is not in shape to get chemotherapy. Since chemotherapy is not planned, PET/CT is not needed. Patient's wife is looking for rehab as she does not think he is strong enough to be home.   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 Resolution

## 2018-10-03 NOTE — DISCHARGE NOTE ADULT - HOSPITAL COURSE
89 y/o M with PMHx of TIA, seizure disorder, AFib (s/p pacemaker), HTN, HLD, systolic HF, bladder Cancer s/p TUPBT on 5/16/2018 and recent radiation therapy admitted with complaint of generalized weakness/fever. He has multiple comorbidities. CT chest showed no PNA, CT head showed no new  CVA.      Pt seen by infectious disease and found Admission blood cultures with yeast in 1/4 bottles  Candida parapsolosis isolated. No obvious  focus, his renal function has been stable and his urine culture was negative  He has seen Dr Turner last admission as well as this admission,not advising chemo this admission  Diarrhea w/u without an infectious cause, negative GI PCR,negative CDT, and negative stool culture.  Hence, cryptogenic focus for fungemia, however well controlled.  Optho exam is negative for endopthalmitis, suspect CT findings reflect malignancy and not infection.  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 Found not to be a candidate for chemotherapy given the comorbidities and his age. This was  the impression in May/June 2018 too, family also did not wish him to get chemotherapy.  Dr. Turner (Oncologist) spoke to patient at wife's request and explained to her at length to her  that his disease has spread to liver. He is not in shape to get chemotherapy. Since chemotherapy is not planned, PET/CT is not needed. Patient's wife is looking for rehab as she does not think he is strong enough to be home.   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    Pt seen by PT and recommended EDUIN. EDUIN placemen was obtained and Pt will be d/c to EDUIN with follow up with Dr. Trejo in 2-3 weeks.

## 2018-10-03 NOTE — PROGRESS NOTE ADULT - ASSESSMENT
Patient is a 89y old  Male admitted with complaint of generalized weakness/fever. He has multiple comorbidities.  He has history of prostate cancer and had seed implants for them in past.   Patient was seen in May/June 2018. 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 He had hematuria which improved. Patient  had radiation in [place] and finished about 3 weeks back.   At present he has metastatic disease. I do not think that he is a candidate for chemotherapy given the comorbidities and his age. This was  the impression in May/June 2018 too, family also did not wish him to get chemotherapy.  I spoke to patient at wife's request.  I explained to her at length to her  that his disease has spread to liver. He is not in shape to get chemotherapy. Since chemotherapy is not planned, PET/CT is not needed.   Patient's wife is looking for rehab as she does not think he is strong enough to be home.   Care coordinator is working on it.   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

## 2018-10-03 NOTE — PROGRESS NOTE ADULT - REASON FOR ADMISSION
generalized weakness/fever

## 2018-10-03 NOTE — PROGRESS NOTE ADULT - SUBJECTIVE AND OBJECTIVE BOX
no  chills/ sob    REVIEW OF SYSTEMS:  GEN: no fever,    no chills  RESP: no SOB,   no cough  CVS: no chest pain,   no palpitations  GI: no abdominal pain,   no nausea,   no vomiting,   no constipation,   no diarrhea  : no dysuria,   no frequency  NEURO: no headache,   no dizziness  PSYCH: no depression,   not anxious  Derm : no rash    MEDICATIONS  (STANDING):  amLODIPine   Tablet 2.5 milliGRAM(s) Oral daily  aspirin enteric coated 81 milliGRAM(s) Oral daily  digoxin     Tablet 0.125 milliGRAM(s) Oral daily  enoxaparin Injectable 40 milliGRAM(s) SubCutaneous daily  fluconAZOLE   Tablet 400 milliGRAM(s) Oral daily  influenza   Vaccine 0.5 milliLiter(s) IntraMuscular once  levETIRAcetam 500 milliGRAM(s) Oral two times a day  metoprolol succinate  milliGRAM(s) Oral two times a day  simvastatin 20 milliGRAM(s) Oral at bedtime  sodium chloride 0.9%. 1000 milliLiter(s) (50 mL/Hr) IV Continuous <Continuous>  tamsulosin 0.4 milliGRAM(s) Oral at bedtime    MEDICATIONS  (PRN):      Vital Signs Last 24 Hrs  T(C): 37.1 (03 Oct 2018 06:14), Max: 37.2 (02 Oct 2018 15:29)  T(F): 98.8 (03 Oct 2018 06:14), Max: 99 (02 Oct 2018 15:29)  HR: 66 (03 Oct 2018 06:14) (64 - 66)  BP: 161/64 (03 Oct 2018 06:14) (132/60 - 174/69)  BP(mean): --  RR: 18 (03 Oct 2018 06:14) (18 - 18)  SpO2: 96% (03 Oct 2018 06:14) (93% - 97%)  CAPILLARY BLOOD GLUCOSE        I&O's Summary    02 Oct 2018 07:01  -  03 Oct 2018 07:00  --------------------------------------------------------  IN: 780 mL / OUT: 1000 mL / NET: -220 mL        PHYSICAL EXAM:  HEAD:  Atraumatic, Normocephalic  NECK: Supple, No   JVD  CHEST/LUNG:   no     rales,     no,    rhonchi  HEART: Regular rate and rhythm;         murmur  ABDOMEN: Soft, Nontender, ;   EXTREMITIES:        edema  NEUROLOGY:  alert    LABS:                        11.3   7.8   )-----------( 209      ( 01 Oct 2018 14:43 )             32.1     10-01    136  |  101  |  12  ----------------------------<  121<H>  3.6   |  25  |  0.75    Ca    9.7      01 Oct 2018 14:42                      Thyroid Stimulating Hormone, Serum: 2.67 uIU/mL (09-27 @ 14:14)          Consultant(s) Notes Reviewed:      Care Discussed with Consultants/Other Providers:

## 2018-10-03 NOTE — DISCHARGE NOTE ADULT - PROVIDER TOKENS
TOKEN:'[id number]',TOKEN:'[id number]',FREE:[LAST:[Oncologist],PHONE:[(   )    -],FAX:[(   )    -]]

## 2018-10-03 NOTE — PROGRESS NOTE ADULT - SUBJECTIVE AND OBJECTIVE BOX
CC: f/u for fungemia    Patient reports; no complaints but his wife is concerned about his weakness.He is voiding okay, no dysuria.    REVIEW OF SYSTEMS:  All other review of systems negative (Comprehensive ROS)    Antimicrobials Day #  :day 6/14  fluconAZOLE   Tablet 400 milliGRAM(s) Oral daily    Other Medications Reviewed    T(F): 98.4 (10-03-18 @ 11:24), Max: 99 (10-02-18 @ 15:29)  HR: 76 (10-03-18 @ 11:24)  BP: 133/60 (10-03-18 @ 11:24)  RR: 18 (10-03-18 @ 11:24)  SpO2: 99% (10-03-18 @ 11:24)  Wt(kg): --    PHYSICAL EXAM:  General: alert, no acute distress  Eyes:  anicteric, no conjunctival injection, no discharge  Oropharynx: no lesions or injection 	  Neck: supple, without adenopathy  Lungs: clear to auscultation  Heart: irregular rate and rhythm; no murmur, rubs or gallops  Abdomen: soft, nondistended, nontender, without mass or organomegaly  Skin: no lesions  Extremities: no clubbing, cyanosis, + edema  Neurologic: alert, oriented, moves all extremities    LAB RESULTS:                        11.3   7.8   )-----------( 209      ( 01 Oct 2018 14:43 )             32.1     10-01    136  |  101  |  12  ----------------------------<  121<H>  3.6   |  25  |  0.75    Ca    9.7      01 Oct 2018 14:42          MICROBIOLOGY:  RECENT CULTURES:  09-30 @ 12:07 .Stool Feces     No enteric pathogens isolated.  (Stool culture examined for Salmonella,  Shigella, Campylobacter, Aeromonas, Plesiomonas,  Vibrio, E.coli O157 and Yersinia)      09-28 @ 19:10 .Blood Blood-Peripheral     No growth to date.          RADIOLOGY REVIEWED:  < from: US Abdomen Complete (10.01.18 @ 11:22) >    IMPRESSION:     Coarsened and heterogeneous echotexture of the liver with multiple   hypoechoic lesions, as seen on recent CT abdomen and pelvis 9/30/2018.    No hydronephrosis. Unchanged 0.9 cm echogenic focus in the right   posterior bladder wall. Underdistention of the bladder limits evaluation   for bladder mass.    < end of copied text >

## 2018-10-03 NOTE — PROGRESS NOTE ADULT - SUBJECTIVE AND OBJECTIVE BOX
- Patient seen and examined.  - In summary, patient is a 89 year old man who presented with generalized weakness/fever (27 Sep 2018 07:10)  - Today, patient is without complaints.         *****MEDICATIONS:    MEDICATIONS  (STANDING):  amLODIPine   Tablet 2.5 milliGRAM(s) Oral daily  aspirin enteric coated 81 milliGRAM(s) Oral daily  digoxin     Tablet 0.125 milliGRAM(s) Oral daily  enoxaparin Injectable 40 milliGRAM(s) SubCutaneous daily  fluconAZOLE   Tablet 400 milliGRAM(s) Oral daily  influenza   Vaccine 0.5 milliLiter(s) IntraMuscular once  levETIRAcetam 500 milliGRAM(s) Oral two times a day  metoprolol succinate  milliGRAM(s) Oral two times a day  simvastatin 20 milliGRAM(s) Oral at bedtime  sodium chloride 0.9%. 1000 milliLiter(s) (50 mL/Hr) IV Continuous <Continuous>  tamsulosin 0.4 milliGRAM(s) Oral at bedtime    MEDICATIONS  (PRN):               ***** REVIEW OF SYSTEM:  GEN: no fever, no chills, no pain  RESP: no SOB, no cough, no sputum  CVS: no chest pain, no palpitations, no edema  GI: no abdominal pain, no nausea, no vomiting, no constipation, no diarrhea  : no dysuria, no frequency  NEURO: no headache, no dizziness  PSYCH: no depression, not anxious  Derm : no itching, no rash         ***** VITAL SIGNS:    T(F): 98.8 (10-03-18 @ 06:14), Max: 99 (10-02-18 @ 15:29)  HR: 66 (10-03-18 @ 06:14) (64 - 66)  BP: 161/64 (10-03-18 @ 06:14) (132/60 - 174/69)  RR: 18 (10-03-18 @ 06:14) (18 - 18)  SpO2: 96% (10-03-18 @ 06:14) (93% - 97%)  Wt(kg): --  ,   I&O's Summary    02 Oct 2018 07:01  -  03 Oct 2018 07:00  --------------------------------------------------------  IN: 780 mL / OUT: 1000 mL / NET: -220 mL    03 Oct 2018 07:01  -  03 Oct 2018 11:24  --------------------------------------------------------  IN: 300 mL / OUT: 0 mL / NET: 300 mL                 *****PHYSICAL EXAM:  GEN: A&O X 3 , NAD , comfortable  HEENT: NCAT, EOMI, MMM, no icterus  NECK: Supple, No JVD  CVS: S1S2 , regular , No M/R/G appreciated  PULM: CTA B/L,  no W/R/R appreciated  ABD.: soft. non tender, non distended,  bowel sounds present  Extrem: intact pulses , no edema noted  Derm: No rash or ecchymosis noted  PSYCH: normal mood, no depression, not anxious         *****LAB AND IMAGIN.3   7.8   )-----------( 209      ( 01 Oct 2018 14:43 )             32.1               10-    136  |  101  |  12  ----------------------------<  121<H>  3.6   |  25  |  0.75    Ca    9.7      01 Oct 2018 14:42                  [All pertinent recent Imaging/Reports reviewed]         *****A S S E S S M E N T   A N D   P L A N :    89M with hx of paf, htn, cva, seizure disorder with change of mental status and fever   head ct no acute change  CT chest no PNA  no AC due hx of hematuria sec to bladder ca s/p recent radiation therapy  f/u cultures  abx per ID  dvt prophylaxis  increase norvasc  f/u oncology  DCP    __________________________  DALILA Robertson D.O.

## 2018-10-03 NOTE — DISCHARGE NOTE ADULT - CARE PLAN
Principal Discharge DX:	Weakness  Assessment and plan of treatment:	Pt seen by infectious disease and found Admission blood cultures with yeast in 1/4 bottles  Candida parapsolosis isolated. No obvious  focus, his renal function has been stable and his urine culture was negative  Diarrhea w/u without an infectious cause, negative GI PCR, negative CDT, and negative stool culture.  Hence, cryptogenic focus for fungemia, however well controlled.  Optho exam is negative for endopthalmitis, suspect CT findings reflect malignancy and not infection.  Pt was placed on fluconazole to complete 2 weeks of treatment  Secondary Diagnosis:	Prostate cancer  Assessment and plan of treatment:	Pt seen by Oncologist and at present found to have metastatic disease to the liver found on CT. Found not to be a candidate for chemotherapy given the comorbidities and his age. This was  the impression in May/June 2018 too, family also did not wish him to get chemotherapy.  Dr. Turner (Oncologist) spoke to patient at wife's request and explained to her at length to her  that his disease has spread to liver. He is not in shape to get chemotherapy. Since chemotherapy is not planned, PET/CT is not needed. Patient's wife is looking for rehab as she does not think he is strong enough to be home.   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 Principal Discharge DX:	Weakness  Goal:	Resolution  Assessment and plan of treatment:	Pt seen by infectious disease and found Admission blood cultures with yeast in 1/4 bottles  Candida parapsolosis isolated. No obvious  focus, his renal function has been stable and his urine culture was negative  Diarrhea w/u without an infectious cause, negative GI PCR, negative CDT, and negative stool culture.  Hence, cryptogenic focus for fungemia, however well controlled.  Optho exam is negative for endopthalmitis, suspect CT findings reflect malignancy and not infection.  Pt was placed on fluconazole to complete 2 weeks of treatment  Secondary Diagnosis:	Prostate cancer  Assessment and plan of treatment:	Pt seen by Oncologist and at present found to have metastatic disease to the liver found on CT. Found not to be a candidate for chemotherapy given the comorbidities and his age. This was  the impression in May/June 2018 too, family also did not wish him to get chemotherapy.  Dr. Turner (Oncologist) spoke to patient at wife's request and explained to her at length to her  that his disease has spread to liver. He is not in shape to get chemotherapy. Since chemotherapy is not planned, PET/CT is not needed. Patient's wife is looking for rehab as she does not think he is strong enough to be home.   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

## 2018-10-03 NOTE — DISCHARGE NOTE ADULT - CARE PROVIDER_API CALL
Clarke Trejo (), Cardiology Medicine  [address]  [place] [address]  Phone: [phone]  Fax: [phone]    Efe Gonzalez (MD), Internal Medicine  [address] [place] [address]  Phone: [phone]  Fax: [phone]    Oncologist,   Phone: (   )    -  Fax: (   )    -

## 2018-10-03 NOTE — PROGRESS NOTE ADULT - ASSESSMENT
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  History of HTN,Afib,SSS, PPM,and TIA.  He had hematuria post TURBT but did not have a documented infection.  S/P radiation therapy in the recent past, ? to bladder  He was admitted with lethargy, diarrhea, and failure to thrive.  Admission blood cultures with yeast in 1/4 bottles  Yeast in blood is almost never a contaminant and must be assumed to be real, even when isolated in only 1/4 bottles.  Candida parapsolosis isolated .No obvious  focus, his renal function has been stable and his urine culture was negative  He has seen Dr Turner last admission as well as this admission,not advising chemo this admission  Diarrhea w/u without an infectious cause, negative GI PCR,negative CDT, and negative stool culture.  Hence, cryptogenic focus for fungemia, however well controlled.  Optho exam is negative for endopthalmitis, suspect CT findings reflect malignancy and not infection.  Suggest:  1.fluconazole to complete 2 weeks of treatment  2.Supportive care, discharge planning per primary team  3.His failure to thrive may reflect underlying malignancy, ID issues reviewed with wife at bedside

## 2018-10-03 NOTE — CHART NOTE - NSCHARTNOTEFT_GEN_A_CORE
Per Dr. Trejo- Pt cleared to go to [facility] with follow up with him at his office in 2-3 weeks from today.   Per Dr. Turner- Pt cleared for discharge  Per Dr. Robertson- Pt cleared for discharge Per Dr. Trejo- Pt cleared to go to [facility], [place] with follow up with him at his office in 2-3 weeks from today.   Per Dr. Turner- "spoke to patient at wife's request.  I explained to her at length to her  that his disease has spread to liver. He is not in shape to get chemotherapy. Since chemotherapy is not planned, PET/CT is not needed. Patient's wife is looking for rehab as she does not think he is strong enough to be home. 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  Per Dr. Robertson- Pt cleared for discharge  Per ID: cleared for d/c on PO Diflucan     Pt will be d/c today to [facility] [place] with follow up with his oncologist and Dr. Trejo Per Dr. Trejo- Pt cleared to go to [facility] with follow up with him at his office in 2-3 weeks from today.   Per Dr. Turner- "spoke to patient at wife's request.  I explained to her at length to her  that his disease has spread to liver. He is not in shape to get chemotherapy. Since chemotherapy is not planned, PET/CT is not needed. Patient's wife is looking for rehab as she does not think he is strong enough to be home. 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  Per Dr. Robertson- Pt cleared for discharge  Per ID (Dr. Martinez): cleared for d/c on PO Diflucan for another 8 days.      Pt will be d/c today to [facility] with follow up with his oncologist and Dr. Trejo

## 2018-10-03 NOTE — DISCHARGE NOTE ADULT - PATIENT PORTAL LINK FT
You can access the [facility] Portal, offered by [facility], by registering with the following website: [email or web address]

## 2018-10-03 NOTE — PROGRESS NOTE ADULT - ASSESSMENT
pt  with htn, tia,  afib, ppm, cardiomyopathy  , tia,. hld    ca  bladder /  small  cell cancer/    s/p  turbt  s/p  gross hematuria in the past  urology/  dr scott  on dig// asa/keppra/ beta  blocker  now  with  weakness/ dehydration  ct  chest. no pna, ct head, no new  cva  pt with candidemia ,was   on micafungin/ now  on diflucan/  candida   parapsilosis  rpt blood  c/s, negative  thus  far/ will await  incubation  ct  abdomen, noted,  pulm nodules/ liver  mets  oncology.  eval  seen  echo/ pt  ambulating      < from: CT Abdomen and Pelvis w/ Oral Cont and w/ IV Cont (09.30.18 @ 17:08) >    IMPRESSION:   Several nodular opacities at the lung bases which are new since 9/26/2018   and may be related to infection.    Bilobar hepatic metastatic disease.  Interval removal of the right posterior lateral bladder wall mass with   right-sided bladder wall thickening remaining with punctate calcification.    Trace ascites.  < end of copied text >

## 2018-10-07 ENCOUNTER — INPATIENT (INPATIENT)
Facility: HOSPITAL | Age: 83
LOS: 4 days | End: 2018-10-12
Attending: GENERAL PRACTICE | Admitting: GENERAL PRACTICE
Payer: MEDICARE

## 2018-10-07 VITALS
HEART RATE: 104 BPM | SYSTOLIC BLOOD PRESSURE: 110 MMHG | RESPIRATION RATE: 32 BRPM | TEMPERATURE: 101 F | OXYGEN SATURATION: 99 % | DIASTOLIC BLOOD PRESSURE: 57 MMHG

## 2018-10-07 DIAGNOSIS — Z85.46 PERSONAL HISTORY OF MALIGNANT NEOPLASM OF PROSTATE: Chronic | ICD-10-CM

## 2018-10-07 DIAGNOSIS — A41.9 SEPSIS, UNSPECIFIED ORGANISM: ICD-10-CM

## 2018-10-07 LAB
ALBUMIN SERPL ELPH-MCNC: 3.3 G/DL — SIGNIFICANT CHANGE UP (ref 3.3–5)
ALP SERPL-CCNC: 281 U/L — HIGH (ref 40–120)
ALT FLD-CCNC: 102 U/L — HIGH (ref 4–41)
APPEARANCE UR: CLEAR — SIGNIFICANT CHANGE UP
APTT BLD: 30.3 SEC — SIGNIFICANT CHANGE UP (ref 27.5–37.4)
AST SERPL-CCNC: 224 U/L — HIGH (ref 4–40)
BASOPHILS # BLD AUTO: 0.05 K/UL — SIGNIFICANT CHANGE UP (ref 0–0.2)
BASOPHILS NFR BLD AUTO: 0.9 % — SIGNIFICANT CHANGE UP (ref 0–2)
BASOPHILS NFR SPEC: 0.9 % — SIGNIFICANT CHANGE UP (ref 0–2)
BILIRUB SERPL-MCNC: 1 MG/DL — SIGNIFICANT CHANGE UP (ref 0.2–1.2)
BILIRUB UR-MCNC: NEGATIVE — SIGNIFICANT CHANGE UP
BLASTS # FLD: 0 % — SIGNIFICANT CHANGE UP (ref 0–0)
BLOOD UR QL VISUAL: SIGNIFICANT CHANGE UP
BUN SERPL-MCNC: 15 MG/DL — SIGNIFICANT CHANGE UP (ref 7–23)
CALCIUM SERPL-MCNC: 10.4 MG/DL — SIGNIFICANT CHANGE UP (ref 8.4–10.5)
CHLORIDE SERPL-SCNC: 98 MMOL/L — SIGNIFICANT CHANGE UP (ref 98–107)
CO2 SERPL-SCNC: 25 MMOL/L — SIGNIFICANT CHANGE UP (ref 22–31)
COLOR SPEC: YELLOW — SIGNIFICANT CHANGE UP
CREAT SERPL-MCNC: 0.91 MG/DL — SIGNIFICANT CHANGE UP (ref 0.5–1.3)
DIGOXIN SERPL-MCNC: 0.8 NG/ML — SIGNIFICANT CHANGE UP (ref 0.8–2)
EOSINOPHIL # BLD AUTO: 0.03 K/UL — SIGNIFICANT CHANGE UP (ref 0–0.5)
EOSINOPHIL NFR BLD AUTO: 0.5 % — SIGNIFICANT CHANGE UP (ref 0–6)
EOSINOPHIL NFR FLD: 0 % — SIGNIFICANT CHANGE UP (ref 0–6)
EPI CELLS # UR: SIGNIFICANT CHANGE UP
GIANT PLATELETS BLD QL SMEAR: PRESENT — SIGNIFICANT CHANGE UP
GLUCOSE SERPL-MCNC: 134 MG/DL — HIGH (ref 70–99)
GLUCOSE UR-MCNC: NEGATIVE — SIGNIFICANT CHANGE UP
HCT VFR BLD CALC: 35.9 % — LOW (ref 39–50)
HGB BLD-MCNC: 12.2 G/DL — LOW (ref 13–17)
IMM GRANULOCYTES # BLD AUTO: 0.37 # — SIGNIFICANT CHANGE UP
IMM GRANULOCYTES NFR BLD AUTO: 6.4 % — HIGH (ref 0–1.5)
INR BLD: 1.3 — HIGH (ref 0.88–1.17)
KETONES UR-MCNC: SIGNIFICANT CHANGE UP
LACTATE BLDV-MCNC: 2.7 MMOL/L — HIGH (ref 0.5–2)
LACTATE BLDV-MCNC: 3.3 MMOL/L — HIGH (ref 0.5–2)
LEUKOCYTE ESTERASE UR-ACNC: NEGATIVE — SIGNIFICANT CHANGE UP
LYMPHOCYTES # BLD AUTO: 0.71 K/UL — LOW (ref 1–3.3)
LYMPHOCYTES # BLD AUTO: 12.3 % — LOW (ref 13–44)
LYMPHOCYTES NFR SPEC AUTO: 9.8 % — LOW (ref 13–44)
MACROCYTES BLD QL: SLIGHT — SIGNIFICANT CHANGE UP
MCHC RBC-ENTMCNC: 28.7 PG — SIGNIFICANT CHANGE UP (ref 27–34)
MCHC RBC-ENTMCNC: 34 % — SIGNIFICANT CHANGE UP (ref 32–36)
MCV RBC AUTO: 84.5 FL — SIGNIFICANT CHANGE UP (ref 80–100)
METAMYELOCYTES # FLD: 1.8 % — HIGH (ref 0–1)
MICROCYTES BLD QL: SIGNIFICANT CHANGE UP
MONOCYTES # BLD AUTO: 0.29 K/UL — SIGNIFICANT CHANGE UP (ref 0–0.9)
MONOCYTES NFR BLD AUTO: 5 % — SIGNIFICANT CHANGE UP (ref 2–14)
MONOCYTES NFR BLD: 1.8 % — LOW (ref 2–9)
MYELOCYTES NFR BLD: 2.7 % — HIGH (ref 0–0)
NEUTROPHIL AB SER-ACNC: 69.6 % — SIGNIFICANT CHANGE UP (ref 43–77)
NEUTROPHILS # BLD AUTO: 4.3 K/UL — SIGNIFICANT CHANGE UP (ref 1.8–7.4)
NEUTROPHILS NFR BLD AUTO: 74.9 % — SIGNIFICANT CHANGE UP (ref 43–77)
NEUTS BAND # BLD: 12.5 % — HIGH (ref 0–6)
NITRITE UR-MCNC: NEGATIVE — SIGNIFICANT CHANGE UP
NRBC # FLD: 0.08 — SIGNIFICANT CHANGE UP
NRBC FLD-RTO: 1.4 — SIGNIFICANT CHANGE UP
OTHER - HEMATOLOGY %: 0 — SIGNIFICANT CHANGE UP
OVALOCYTES BLD QL SMEAR: SLIGHT — SIGNIFICANT CHANGE UP
PH UR: 6 — SIGNIFICANT CHANGE UP (ref 5–8)
PLATELET # BLD AUTO: 220 K/UL — SIGNIFICANT CHANGE UP (ref 150–400)
PLATELET COUNT - ESTIMATE: NORMAL — SIGNIFICANT CHANGE UP
PMV BLD: 10.3 FL — SIGNIFICANT CHANGE UP (ref 7–13)
POTASSIUM SERPL-MCNC: 3.9 MMOL/L — SIGNIFICANT CHANGE UP (ref 3.5–5.3)
POTASSIUM SERPL-SCNC: 3.9 MMOL/L — SIGNIFICANT CHANGE UP (ref 3.5–5.3)
PROMYELOCYTES # FLD: 0 % — SIGNIFICANT CHANGE UP (ref 0–0)
PROT SERPL-MCNC: 7.2 G/DL — SIGNIFICANT CHANGE UP (ref 6–8.3)
PROT UR-MCNC: 70 — SIGNIFICANT CHANGE UP
PROTHROM AB SERPL-ACNC: 14.5 SEC — HIGH (ref 9.8–13.1)
RBC # BLD: 4.25 M/UL — SIGNIFICANT CHANGE UP (ref 4.2–5.8)
RBC # FLD: 13.6 % — SIGNIFICANT CHANGE UP (ref 10.3–14.5)
SMUDGE CELLS # BLD: PRESENT — SIGNIFICANT CHANGE UP
SODIUM SERPL-SCNC: 137 MMOL/L — SIGNIFICANT CHANGE UP (ref 135–145)
SP GR SPEC: 1.03 — SIGNIFICANT CHANGE UP (ref 1–1.04)
UROBILINOGEN FLD QL: 2 — SIGNIFICANT CHANGE UP
VARIANT LYMPHS # BLD: 0.9 % — SIGNIFICANT CHANGE UP
WBC # BLD: 5.75 K/UL — SIGNIFICANT CHANGE UP (ref 3.8–10.5)
WBC # FLD AUTO: 5.75 K/UL — SIGNIFICANT CHANGE UP (ref 3.8–10.5)
WBC UR QL: SIGNIFICANT CHANGE UP (ref 0–?)

## 2018-10-07 PROCEDURE — 85060 BLOOD SMEAR INTERPRETATION: CPT

## 2018-10-07 PROCEDURE — 71045 X-RAY EXAM CHEST 1 VIEW: CPT | Mod: 26

## 2018-10-07 RX ORDER — MEROPENEM 1 G/30ML
1000 INJECTION INTRAVENOUS ONCE
Qty: 0 | Refills: 0 | Status: COMPLETED | OUTPATIENT
Start: 2018-10-07 | End: 2018-10-07

## 2018-10-07 RX ORDER — ACETAMINOPHEN 500 MG
1000 TABLET ORAL ONCE
Qty: 0 | Refills: 0 | Status: COMPLETED | OUTPATIENT
Start: 2018-10-07 | End: 2018-10-07

## 2018-10-07 RX ORDER — SODIUM CHLORIDE 9 MG/ML
1000 INJECTION, SOLUTION INTRAVENOUS ONCE
Qty: 0 | Refills: 0 | Status: COMPLETED | OUTPATIENT
Start: 2018-10-07 | End: 2018-10-07

## 2018-10-07 RX ORDER — VANCOMYCIN HCL 1 G
1000 VIAL (EA) INTRAVENOUS ONCE
Qty: 0 | Refills: 0 | Status: COMPLETED | OUTPATIENT
Start: 2018-10-07 | End: 2018-10-07

## 2018-10-07 RX ORDER — MEROPENEM 1 G/30ML
500 INJECTION INTRAVENOUS ONCE
Qty: 0 | Refills: 0 | Status: DISCONTINUED | OUTPATIENT
Start: 2018-10-07 | End: 2018-10-07

## 2018-10-07 RX ORDER — FLUCONAZOLE 150 MG/1
400 TABLET ORAL ONCE
Qty: 0 | Refills: 0 | Status: COMPLETED | OUTPATIENT
Start: 2018-10-07 | End: 2018-10-07

## 2018-10-07 RX ADMIN — Medication 400 MILLIGRAM(S): at 18:55

## 2018-10-07 RX ADMIN — Medication 1000 MILLIGRAM(S): at 19:56

## 2018-10-07 RX ADMIN — FLUCONAZOLE 100 MILLIGRAM(S): 150 TABLET ORAL at 23:24

## 2018-10-07 RX ADMIN — Medication 1000 MILLIGRAM(S): at 20:19

## 2018-10-07 RX ADMIN — Medication 250 MILLIGRAM(S): at 19:56

## 2018-10-07 RX ADMIN — MEROPENEM 1000 MILLIGRAM(S): 1 INJECTION INTRAVENOUS at 20:19

## 2018-10-07 RX ADMIN — Medication 1000 MILLIGRAM(S): at 21:27

## 2018-10-07 RX ADMIN — SODIUM CHLORIDE 1000 MILLILITER(S): 9 INJECTION, SOLUTION INTRAVENOUS at 19:56

## 2018-10-07 RX ADMIN — MEROPENEM 100 MILLIGRAM(S): 1 INJECTION INTRAVENOUS at 20:19

## 2018-10-07 RX ADMIN — SODIUM CHLORIDE 1000 MILLILITER(S): 9 INJECTION, SOLUTION INTRAVENOUS at 19:07

## 2018-10-07 NOTE — CONSULT NOTE ADULT - SUBJECTIVE AND OBJECTIVE BOX
CHIEF COMPLAINT: AMS and abdominal pain    HPI:  Patient is a 89 y/o M with a PMH significant for metastatic bladder cancer s/p TUBPT (5/16/18) and radiation therapy, TIA, seizure disorder, AFib (s/p pacemaker), HTN, HLD, and CHF who presents to the hospital from [facility] with AMS and abdominal pain. He was recently admitted to the hospital and found to have candida parapsolosis fungemia and was started on fluconazole for treatment. During his previous hospitalization he was also found to have progression of his bladder cancer with new metastases to the liver. He was seen by oncology who said given his functional status, he was not a candidate for chemotherapy.     Since his discharge to rehab, the patient had been in his usual state of health but then yesterday developed abdominal pain associated with nausea and vomiting. The wife also noticed that his heart rate and blood pressure were fluctuating and then earlier this evening he became less responsive so they insisted he be brought to the hospital.    Upon arrival to the ED, the patient was febrile to 101.4 that subsequently angelica to 105.2. He was initially tachy to 104, /57 with a RR of 22 and sating 99% on 2L of NC. He was given one dose each of vancomycin and meropenem and started on IV fluconazole. He was given 1L of fluid and IV tylenol.      MICU consulted for sepsis.    On exam, patient is lethargic but responds to verbal stimulation and questioning. He denies any pain and states he is comfortable.       PAST MEDICAL & SURGICAL HISTORY:  Cancer: prostate/bladder cancer  Pacemaker  HF (heart failure)  Seizure  Prostate cancer  BPH (benign prostatic hypertrophy)  PAF (paroxysmal atrial fibrillation)  TIA (Transient Ischemic Attack)  Prostate Ca: s/p seed implant  Hyperlipemia  HTN (Hypertension)  H/O prostate cancer: seed implant  No Past Surgical History      FAMILY HISTORY:  No pertinent family history in first degree relatives      SOCIAL HISTORY:  Smoking: [X ] Never Smoked [ ] Former Smoker (__ packs x ___ years) [ ] Current Smoker  (__ packs x ___ years)  Substance Use: [X ] Never Used [ ] Used ____  EtOH Use: none  Marital Status: [ ] Single [X ]  [ ]  [ ]   Advance Directives:    Allergies    ACE inhibitors (Other)    Intolerances        HOME MEDICATIONS:    REVIEW OF SYSTEMS:  Constitutional: [ ] fevers [ ] chills [ ] weight loss [ ] weight gain  HEENT: [ ] dry eyes [ ] eye irritation [ ] postnasal drip [ ] nasal congestion  CV: [ ] chest pain [ ] orthopnea [ ] palpitations [ ] murmur  Resp: [ ] cough [ ] shortness of breath [ ] dyspnea [ ] wheezing [ ] sputum [ ] hemoptysis  GI: [ ] nausea [ ] vomiting [ ] diarrhea [ ] constipation [ X] abd pain [ ] dysphagia   : [ ] dysuria [ ] nocturia [ ] hematuria [ ] increased urinary frequency  Musculoskeletal: [ ] back pain [ ] myalgias [ ] arthralgias [ ] fracture  Skin: [ ] rash [ ] itch  Neurological: [ ] headache [ ] dizziness [ ] syncope [ ] weakness [ ] numbness  Psychiatric: [ ] anxiety [ ] depression  Endocrine: [ ] diabetes [ ] thyroid problem  Hematologic/Lymphatic: [ ] anemia [ ] bleeding problem  Allergic/Immunologic: [ ] itchy eyes [ ] nasal discharge [ ] hives [ ] angioedema    [ ] Unable to assess ROS because ________    OBJECTIVE:  ICU Vital Signs Last 24 Hrs  T(C): 37.6 (07 Oct 2018 21:50), Max: 40.7 (07 Oct 2018 19:07)  T(F): 99.7 (07 Oct 2018 21:50), Max: 105.2 (07 Oct 2018 19:07)  HR: 82 (07 Oct 2018 21:50) (82 - 104)  BP: 116/60 (07 Oct 2018 21:50) (110/57 - 147/51)  BP(mean): --  ABP: --  ABP(mean): --  RR: 22 (07 Oct 2018 21:50) (22 - 32)  SpO2: 95% (07 Oct 2018 21:50) (95% - 99%)        CAPILLARY BLOOD GLUCOSE      POCT Blood Glucose.: 128 mg/dL (07 Oct 2018 18:21)      PHYSICAL EXAM:  General: lethargic but opens eyes to verbal commands  Respiratory: poor effort but grossly clear in lungs bilaterally   Cardiovascular: irregularly irregular, no murmurs appreciated  Abdomen: +BS, soft, non tender, non distended   Extremities: no peripheral edema noted  Skin: no rashes or ecchymoses  Neurological: no focal deficits, moving all 4 extremities   Psychiatry: AAOx1-2 (not oriented to place or time but recognizes family members)    LINES:     HOSPITAL MEDICATIONS:  MEDICATIONS  (STANDING):  fluconAZOLE IVPB 400 milliGRAM(s) IV Intermittent once    MEDICATIONS  (PRN):      LABS:                        12.2   5.75  )-----------( 220      ( 07 Oct 2018 18:44 )             35.9     Hgb Trend: 12.2<--, 11.3<--  10-07    137  |  98  |  15  ----------------------------<  134<H>  3.9   |  25  |  0.91    Ca    10.4      07 Oct 2018 18:44    TPro  7.2  /  Alb  3.3  /  TBili  1.0  /  DBili  x   /  AST  224<H>  /  ALT  102<H>  /  AlkPhos  281<H>  10-07    Creatinine Trend: 0.91<--, 0.75<--, 0.93<--, 1.23<--, 1.60<--, 0.99<--  PT/INR - ( 07 Oct 2018 18:44 )   PT: 14.5 SEC;   INR: 1.30          PTT - ( 07 Oct 2018 18:44 )  PTT:30.3 SEC      Venous Blood Gas:  10-07 @ 20:09  --/--/--/--/--  VBG Lactate: 2.7  Venous Blood Gas:  10-07 @ 18:44  --/--/--/--/--  VBG Lactate: 3.3      MICROBIOLOGY:     RADIOLOGY:  [ ] Reviewed and interpreted by me    EKG:

## 2018-10-07 NOTE — ED ADULT NURSE NOTE - OBJECTIVE STATEMENT
Pt received to Tra B as a code sepsis. Pt brought in by EMS from [facility] with AMS and fevers. As per pt family pt is usually a&ox3. Pt was recently found to have + blood cultures for fungal infection. Pt noted to be febrile in .2 rectally. Pt put on cooling blanket and started on IV Tylenol as per MD order. 20g IV placed in RT forearm, septic labs sent. Will monitor.

## 2018-10-07 NOTE — ED ADULT NURSE REASSESSMENT NOTE - NS ED NURSE REASSESS COMMENT FT1
Received report from Chanelle GONZALES RN. pt [id number]. VS as noted. NSR on cardiac monitor. Repeat rectal temp shows 99.7, cooling blanket removed at this time. ICU eval in progress. Family at bedside. Will continue to monitor.

## 2018-10-07 NOTE — ED ADULT NURSE REASSESSMENT NOTE - NS ED NURSE REASSESS COMMENT FT1
Spoke with Dr. Hill, notified regarding 12% bands. Pt in NAD at present. Meds given as ordered. Will monitor.

## 2018-10-07 NOTE — ED PROVIDER NOTE - OBJECTIVE STATEMENT
89 YOM BIBEMS with AMS and fever, pt minimally responsive was at [facility] prior to arrival, recently discharged with fungemia.

## 2018-10-07 NOTE — CONSULT NOTE ADULT - ASSESSMENT
Patient is a 89 y/o M with a PMH significant for metastatic bladder cancer s/p TUBPT (5/16/18) and radiation therapy, TIA, seizure disorder, AFib (s/p pacemaker), HTN, HLD, and CHF who presents to the hospital from [facility] with AMS and abdominal pain, found to have sepsis.    # Sepsis  - patient febrile and tachycardic, source of infection possible GI vs respiratory   - patient with EF of 55%, was given 1L of fluids in the ED but then POCUS done by ED concerning for B lines so further fluid resuscitation was held although no documented episodes of hypotension listed in sunrise   - at the time of evaluation, patient's BP stable but intermittently fluctuating between 90s-110s  - follow up blood cultures, continue broad spectrum antibiotics, and follow up CT abdomen/pelvis  - if patient does not appear or sound fluid overloaded, could consider small boluses of IV fluid to support the blood pressure (ex 250cc) with caution         # GOC conversation  - spoke with family about patient's current condition and comorbidities including metastatic cancer  - explained to patient and family what DNR/DNI is and the different options that exist for patient's in the case of medical emergency including comfort care  - patient's family not ready to make decisions at this time, would consider palliative care consult when patient is admitted to further discuss GOC as patient is not a candidate for further chemotherapy given his condition per previous documentation by oncology       Patient does not require MICU level care at this time. Please re-consult if needed.  Discussed with patient's family, ED attending, MICU attending.    Vidya Luther PGY-2  MICU resident

## 2018-10-07 NOTE — ED ADULT NURSE NOTE - NSIMPLEMENTINTERV_GEN_ALL_ED
Implemented All Fall with Harm Risk Interventions:  [place] to call system. Call bell, personal items and telephone within reach. Instruct patient to call for assistance. Room bathroom lighting operational. Non-slip footwear when patient is off stretcher. Physically safe environment: no spills, clutter or unnecessary equipment. Stretcher in lowest position, wheels locked, appropriate side rails in place. Provide visual cue, wrist band, yellow gown, etc. Monitor gait and stability. Monitor for mental status changes and reorient to person, place, and time. Review medications for side effects contributing to fall risk. Reinforce activity limits and safety measures with patient and family. Provide visual clues: red socks.

## 2018-10-08 ENCOUNTER — TRANSCRIPTION ENCOUNTER (OUTPATIENT)
Age: 83
End: 2018-10-08

## 2018-10-08 DIAGNOSIS — R19.7 DIARRHEA, UNSPECIFIED: ICD-10-CM

## 2018-10-08 DIAGNOSIS — Z95.0 PRESENCE OF CARDIAC PACEMAKER: ICD-10-CM

## 2018-10-08 DIAGNOSIS — I10 ESSENTIAL (PRIMARY) HYPERTENSION: ICD-10-CM

## 2018-10-08 DIAGNOSIS — I48.0 PAROXYSMAL ATRIAL FIBRILLATION: ICD-10-CM

## 2018-10-08 DIAGNOSIS — I50.43 ACUTE ON CHRONIC COMBINED SYSTOLIC (CONGESTIVE) AND DIASTOLIC (CONGESTIVE) HEART FAILURE: ICD-10-CM

## 2018-10-08 DIAGNOSIS — R74.0 NONSPECIFIC ELEVATION OF LEVELS OF TRANSAMINASE AND LACTIC ACID DEHYDROGENASE [LDH]: ICD-10-CM

## 2018-10-08 DIAGNOSIS — R56.9 UNSPECIFIED CONVULSIONS: ICD-10-CM

## 2018-10-08 DIAGNOSIS — A41.9 SEPSIS, UNSPECIFIED ORGANISM: ICD-10-CM

## 2018-10-08 DIAGNOSIS — R05 COUGH: ICD-10-CM

## 2018-10-08 DIAGNOSIS — R78.81 BACTEREMIA: ICD-10-CM

## 2018-10-08 PROBLEM — C80.1 MALIGNANT (PRIMARY) NEOPLASM, UNSPECIFIED: Chronic | Status: ACTIVE | Noted: 2018-09-26

## 2018-10-08 LAB
ALBUMIN SERPL ELPH-MCNC: 2.6 G/DL — LOW (ref 3.3–5)
ALP SERPL-CCNC: 188 U/L — HIGH (ref 40–120)
ALT FLD-CCNC: 74 U/L — HIGH (ref 4–41)
AST SERPL-CCNC: 184 U/L — HIGH (ref 4–40)
B PERT DNA SPEC QL NAA+PROBE: SIGNIFICANT CHANGE UP
BASE EXCESS BLDV CALC-SCNC: 2.8 MMOL/L — SIGNIFICANT CHANGE UP
BILIRUB SERPL-MCNC: 1.3 MG/DL — HIGH (ref 0.2–1.2)
BLOOD GAS VENOUS - CREATININE: 0.88 MG/DL — SIGNIFICANT CHANGE UP (ref 0.5–1.3)
BUN SERPL-MCNC: 17 MG/DL — SIGNIFICANT CHANGE UP (ref 7–23)
C PNEUM DNA SPEC QL NAA+PROBE: NOT DETECTED — SIGNIFICANT CHANGE UP
CALCIUM SERPL-MCNC: 9.4 MG/DL — SIGNIFICANT CHANGE UP (ref 8.4–10.5)
CHLORIDE BLDV-SCNC: 102 MMOL/L — SIGNIFICANT CHANGE UP (ref 96–108)
CHLORIDE SERPL-SCNC: 98 MMOL/L — SIGNIFICANT CHANGE UP (ref 98–107)
CO2 SERPL-SCNC: 22 MMOL/L — SIGNIFICANT CHANGE UP (ref 22–31)
CREAT SERPL-MCNC: 0.98 MG/DL — SIGNIFICANT CHANGE UP (ref 0.5–1.3)
FLUAV H1 2009 PAND RNA SPEC QL NAA+PROBE: NOT DETECTED — SIGNIFICANT CHANGE UP
FLUAV H1 RNA SPEC QL NAA+PROBE: NOT DETECTED — SIGNIFICANT CHANGE UP
FLUAV H3 RNA SPEC QL NAA+PROBE: NOT DETECTED — SIGNIFICANT CHANGE UP
FLUAV SUBTYP SPEC NAA+PROBE: SIGNIFICANT CHANGE UP
FLUBV RNA SPEC QL NAA+PROBE: NOT DETECTED — SIGNIFICANT CHANGE UP
GAS PNL BLDV: 134 MMOL/L — LOW (ref 136–146)
GLUCOSE BLDV-MCNC: 100 — HIGH (ref 70–99)
GLUCOSE SERPL-MCNC: 97 MG/DL — SIGNIFICANT CHANGE UP (ref 70–99)
HADV DNA SPEC QL NAA+PROBE: NOT DETECTED — SIGNIFICANT CHANGE UP
HCO3 BLDV-SCNC: 26 MMOL/L — SIGNIFICANT CHANGE UP (ref 20–27)
HCOV 229E RNA SPEC QL NAA+PROBE: NOT DETECTED — SIGNIFICANT CHANGE UP
HCOV HKU1 RNA SPEC QL NAA+PROBE: NOT DETECTED — SIGNIFICANT CHANGE UP
HCOV NL63 RNA SPEC QL NAA+PROBE: NOT DETECTED — SIGNIFICANT CHANGE UP
HCOV OC43 RNA SPEC QL NAA+PROBE: NOT DETECTED — SIGNIFICANT CHANGE UP
HCT VFR BLD CALC: 31.3 % — LOW (ref 39–50)
HCT VFR BLDV CALC: 33.5 % — LOW (ref 39–51)
HGB BLD-MCNC: 10.5 G/DL — LOW (ref 13–17)
HGB BLDV-MCNC: 10.9 G/DL — LOW (ref 13–17)
HMPV RNA SPEC QL NAA+PROBE: NOT DETECTED — SIGNIFICANT CHANGE UP
HPIV1 RNA SPEC QL NAA+PROBE: NOT DETECTED — SIGNIFICANT CHANGE UP
HPIV2 RNA SPEC QL NAA+PROBE: NOT DETECTED — SIGNIFICANT CHANGE UP
HPIV3 RNA SPEC QL NAA+PROBE: NOT DETECTED — SIGNIFICANT CHANGE UP
HPIV4 RNA SPEC QL NAA+PROBE: NOT DETECTED — SIGNIFICANT CHANGE UP
LACTATE BLDV-MCNC: 3 MMOL/L — HIGH (ref 0.5–2)
M PNEUMO DNA SPEC QL NAA+PROBE: NOT DETECTED — SIGNIFICANT CHANGE UP
MCHC RBC-ENTMCNC: 28.9 PG — SIGNIFICANT CHANGE UP (ref 27–34)
MCHC RBC-ENTMCNC: 33.5 % — SIGNIFICANT CHANGE UP (ref 32–36)
MCV RBC AUTO: 86.2 FL — SIGNIFICANT CHANGE UP (ref 80–100)
NRBC # FLD: 0.06 — SIGNIFICANT CHANGE UP
PCO2 BLDV: 42 MMHG — SIGNIFICANT CHANGE UP (ref 41–51)
PH BLDV: 7.42 PH — SIGNIFICANT CHANGE UP (ref 7.32–7.43)
PLATELET # BLD AUTO: 184 K/UL — SIGNIFICANT CHANGE UP (ref 150–400)
PMV BLD: 10.5 FL — SIGNIFICANT CHANGE UP (ref 7–13)
PO2 BLDV: 29 MMHG — LOW (ref 35–40)
POTASSIUM BLDV-SCNC: 4.4 MMOL/L — SIGNIFICANT CHANGE UP (ref 3.4–4.5)
POTASSIUM SERPL-MCNC: 4.5 MMOL/L — SIGNIFICANT CHANGE UP (ref 3.5–5.3)
POTASSIUM SERPL-SCNC: 4.5 MMOL/L — SIGNIFICANT CHANGE UP (ref 3.5–5.3)
PROT SERPL-MCNC: 5.9 G/DL — LOW (ref 6–8.3)
RBC # BLD: 3.63 M/UL — LOW (ref 4.2–5.8)
RBC # FLD: 13.7 % — SIGNIFICANT CHANGE UP (ref 10.3–14.5)
RSV RNA SPEC QL NAA+PROBE: NOT DETECTED — SIGNIFICANT CHANGE UP
RV+EV RNA SPEC QL NAA+PROBE: NOT DETECTED — SIGNIFICANT CHANGE UP
SAO2 % BLDV: 44.4 % — LOW (ref 60–85)
SODIUM SERPL-SCNC: 136 MMOL/L — SIGNIFICANT CHANGE UP (ref 135–145)
SPECIMEN SOURCE: SIGNIFICANT CHANGE UP
SPECIMEN SOURCE: SIGNIFICANT CHANGE UP
WBC # BLD: 8.43 K/UL — SIGNIFICANT CHANGE UP (ref 3.8–10.5)
WBC # FLD AUTO: 8.43 K/UL — SIGNIFICANT CHANGE UP (ref 3.8–10.5)

## 2018-10-08 PROCEDURE — 74176 CT ABD & PELVIS W/O CONTRAST: CPT | Mod: 26

## 2018-10-08 PROCEDURE — 71250 CT THORAX DX C-: CPT | Mod: 26

## 2018-10-08 RX ORDER — MIDODRINE HYDROCHLORIDE 2.5 MG/1
20 TABLET ORAL ONCE
Qty: 0 | Refills: 0 | Status: COMPLETED | OUTPATIENT
Start: 2018-10-08 | End: 2018-10-08

## 2018-10-08 RX ORDER — HEPARIN SODIUM 5000 [USP'U]/ML
5000 INJECTION INTRAVENOUS; SUBCUTANEOUS EVERY 8 HOURS
Qty: 0 | Refills: 0 | Status: DISCONTINUED | OUTPATIENT
Start: 2018-10-08 | End: 2018-10-12

## 2018-10-08 RX ORDER — DIGOXIN 250 MCG
1 TABLET ORAL
Qty: 0 | Refills: 0 | COMMUNITY

## 2018-10-08 RX ORDER — MEROPENEM 1 G/30ML
2000 INJECTION INTRAVENOUS EVERY 8 HOURS
Qty: 0 | Refills: 0 | Status: DISCONTINUED | OUTPATIENT
Start: 2018-10-08 | End: 2018-10-12

## 2018-10-08 RX ORDER — MIDODRINE HYDROCHLORIDE 2.5 MG/1
20 TABLET ORAL EVERY 8 HOURS
Qty: 0 | Refills: 0 | Status: DISCONTINUED | OUTPATIENT
Start: 2018-10-08 | End: 2018-10-08

## 2018-10-08 RX ORDER — FLUCONAZOLE 150 MG/1
400 TABLET ORAL EVERY 24 HOURS
Qty: 0 | Refills: 0 | Status: DISCONTINUED | OUTPATIENT
Start: 2018-10-08 | End: 2018-10-11

## 2018-10-08 RX ORDER — SODIUM CHLORIDE 9 MG/ML
500 INJECTION INTRAMUSCULAR; INTRAVENOUS; SUBCUTANEOUS ONCE
Qty: 0 | Refills: 0 | Status: COMPLETED | OUTPATIENT
Start: 2018-10-08 | End: 2018-10-08

## 2018-10-08 RX ORDER — ACETAMINOPHEN 500 MG
650 TABLET ORAL ONCE
Qty: 0 | Refills: 0 | Status: COMPLETED | OUTPATIENT
Start: 2018-10-08 | End: 2018-10-08

## 2018-10-08 RX ORDER — TAMSULOSIN HYDROCHLORIDE 0.4 MG/1
0.4 CAPSULE ORAL AT BEDTIME
Qty: 0 | Refills: 0 | Status: DISCONTINUED | OUTPATIENT
Start: 2018-10-08 | End: 2018-10-12

## 2018-10-08 RX ORDER — LEVETIRACETAM 250 MG/1
500 TABLET, FILM COATED ORAL
Qty: 0 | Refills: 0 | Status: DISCONTINUED | OUTPATIENT
Start: 2018-10-08 | End: 2018-10-11

## 2018-10-08 RX ORDER — SODIUM CHLORIDE 9 MG/ML
1000 INJECTION INTRAMUSCULAR; INTRAVENOUS; SUBCUTANEOUS
Qty: 0 | Refills: 0 | Status: COMPLETED | OUTPATIENT
Start: 2018-10-08 | End: 2018-10-08

## 2018-10-08 RX ORDER — VANCOMYCIN HCL 1 G
1000 VIAL (EA) INTRAVENOUS EVERY 12 HOURS
Qty: 0 | Refills: 0 | Status: DISCONTINUED | OUTPATIENT
Start: 2018-10-08 | End: 2018-10-09

## 2018-10-08 RX ORDER — ASPIRIN/CALCIUM CARB/MAGNESIUM 324 MG
81 TABLET ORAL DAILY
Qty: 0 | Refills: 0 | Status: DISCONTINUED | OUTPATIENT
Start: 2018-10-08 | End: 2018-10-12

## 2018-10-08 RX ORDER — DIGOXIN 250 MCG
0.12 TABLET ORAL DAILY
Qty: 0 | Refills: 0 | Status: DISCONTINUED | OUTPATIENT
Start: 2018-10-08 | End: 2018-10-12

## 2018-10-08 RX ADMIN — HEPARIN SODIUM 5000 UNIT(S): 5000 INJECTION INTRAVENOUS; SUBCUTANEOUS at 06:02

## 2018-10-08 RX ADMIN — MIDODRINE HYDROCHLORIDE 20 MILLIGRAM(S): 2.5 TABLET ORAL at 00:46

## 2018-10-08 RX ADMIN — SODIUM CHLORIDE 75 MILLILITER(S): 9 INJECTION INTRAMUSCULAR; INTRAVENOUS; SUBCUTANEOUS at 05:44

## 2018-10-08 RX ADMIN — LEVETIRACETAM 500 MILLIGRAM(S): 250 TABLET, FILM COATED ORAL at 06:02

## 2018-10-08 RX ADMIN — SODIUM CHLORIDE 75 MILLILITER(S): 9 INJECTION INTRAMUSCULAR; INTRAVENOUS; SUBCUTANEOUS at 19:09

## 2018-10-08 RX ADMIN — Medication 250 MILLIGRAM(S): at 06:54

## 2018-10-08 RX ADMIN — FLUCONAZOLE 100 MILLIGRAM(S): 150 TABLET ORAL at 23:55

## 2018-10-08 RX ADMIN — SODIUM CHLORIDE 500 MILLILITER(S): 9 INJECTION INTRAMUSCULAR; INTRAVENOUS; SUBCUTANEOUS at 01:48

## 2018-10-08 RX ADMIN — Medication 0.12 MILLIGRAM(S): at 06:03

## 2018-10-08 RX ADMIN — MEROPENEM 200 MILLIGRAM(S): 1 INJECTION INTRAVENOUS at 06:02

## 2018-10-08 RX ADMIN — LEVETIRACETAM 500 MILLIGRAM(S): 250 TABLET, FILM COATED ORAL at 23:06

## 2018-10-08 RX ADMIN — Medication 81 MILLIGRAM(S): at 12:24

## 2018-10-08 RX ADMIN — HEPARIN SODIUM 5000 UNIT(S): 5000 INJECTION INTRAVENOUS; SUBCUTANEOUS at 23:04

## 2018-10-08 RX ADMIN — Medication 250 MILLIGRAM(S): at 19:10

## 2018-10-08 RX ADMIN — HEPARIN SODIUM 5000 UNIT(S): 5000 INJECTION INTRAVENOUS; SUBCUTANEOUS at 13:42

## 2018-10-08 RX ADMIN — TAMSULOSIN HYDROCHLORIDE 0.4 MILLIGRAM(S): 0.4 CAPSULE ORAL at 23:05

## 2018-10-08 RX ADMIN — Medication 650 MILLIGRAM(S): at 06:02

## 2018-10-08 RX ADMIN — MEROPENEM 200 MILLIGRAM(S): 1 INJECTION INTRAVENOUS at 23:04

## 2018-10-08 RX ADMIN — MEROPENEM 200 MILLIGRAM(S): 1 INJECTION INTRAVENOUS at 13:41

## 2018-10-08 NOTE — H&P ADULT - PROBLEM SELECTOR PLAN 1
Patient with high grade fevers, tachycardic, hypotension, unclear etiology of sepsis  UA negative, F/u UCx, Blood Cx, CXRY no evidence of consolidation, F/u RVP  S/p Vancomycin, Meropenem and Fluconazole  Continue Vancomycin, Zosyn, Fluconazole given hx of candida bacteremia in the past Patient with high grade fevers, tachycardic, hypotension, unclear etiology of sepsis  UA negative, F/u UCx, Blood Cx, CXRY no evidence of consolidation, F/u RVP  S/p Vancomycin, Meropenem and Fluconazole  Continue Vancomycin, Zosyn, Fluconazole given hx of candida bacteremia in the past  BP 80s/50s, MICU updated, will give midodrine 20 mg PO x 1 Patient with high grade fevers, tachycardic, hypotension, unclear etiology of sepsis  UA negative, F/u UCx, Blood Cx, CXRY no evidence of consolidation, F/u RVP  S/p Vancomycin, Meropenem and Fluconazole  Continue Vancomycin, Zosyn, Fluconazole given hx of candida bacteremia in the past  BP 80s/50s, MICU updated, will give midodrine 20 mg PO x 1  F/u urine legionella, Azithromycin 500 mg IV x 1 Patient with high grade fevers, tachycardic, hypotension, unclear etiology of sepsis  UA negative, F/u UCx, Blood Cx, CXRY no evidence of consolidation, F/u RVP  S/p Vancomycin, Meropenem and Fluconazole  Continue Vancomycin, Zosyn, Fluconazole given hx of candida bacteremia in the past  BP 80s/50s, MICU updated, will give midodrine 20 mg PO x 1  F/u urine legionella, consider Azithromycin 500 mg IV x 1 if no improvement Patient with high grade fevers, tachycardic, hypotension, unclear etiology of sepsis  UA negative, F/u UCx, Blood Cx, CXRY no evidence of consolidation, F/u RVP  S/p Vancomycin, Meropenem and Fluconazole  Continue Vancomycin, Zosyn, Fluconazole given hx of candida bacteremia in the past  12.5% bands noted   BP 80s/50s, MICU updated, will give midodrine 20 mg PO x 1  F/u urine legionella, consider Azithromycin 500 mg IV x 1 if no improvement Patient with high grade fevers, tachycardic, hypotension, unclear etiology of sepsis  UA negative, F/u UCx, Blood Cx, CXRY no evidence of consolidation, F/u RVP  S/p Vancomycin, Meropenem and Fluconazole  Continue Vancomycin, Zosyn, Fluconazole given hx of candida bacteremia in the past  12.5% bands noted   BP 80s/50s, MICU updated, will give midodrine 20 mg PO x 1  F/u urine legionella, consider Azithromycin 500 mg IV x 1 if no improvement  ID consult in AM

## 2018-10-08 NOTE — H&P ADULT - PROBLEM SELECTOR PLAN 2
Abdominal pain with diarrhea, unclear etiology on fluconazole  CTAP noncontrast given nausea and vomiting  GI PCR, stool cx, c. diff Abdominal pain with diarrhea, unclear etiology on fluconazole  CTAP noncontrast given nausea and vomiting, however patient reporting diarrhea in the past with work up completed with negative GI PCR, stool cx   Repeat GI PCR, stool cx, c. diff

## 2018-10-08 NOTE — H&P ADULT - NSHPLABSRESULTS_GEN_ALL_CORE
12.2   5.75  )-----------( 220      ( 07 Oct 2018 18:44 )             35.9       10-07    137  |  98  |  15  ----------------------------<  134<H>  3.9   |  25  |  0.91    Ca    10.4      07 Oct 2018 18:44    TPro  7.2  /  Alb  3.3  /  TBili  1.0  /  DBili  x   /  AST  224<H>  /  ALT  102<H>  /  AlkPhos  281<H>  10-07      Urinalysis (10.07.18 @ 21:50)    Color: YELLOW    Urine Appearance: CLEAR    Glucose: NEGATIVE    Bilirubin: NEGATIVE    Ketone - Urine: TRACE    Specific Gravity: 1.033    Blood: TRACE    pH - Urine: 6.0    Protein, Urine: 70    Urobilinogen: 2    Nitrite: NEGATIVE    Leukocyte Esterase Concentration: NEGATIVE    White Blood Cell - Urine: 10-25    Epithelial Cells: 5-10      < from: Xray Chest 1 View-PORTABLE IMMEDIATE (10.07.18 @ 19:34) >    PROCEDURE DATE:  Oct  7 2018     ******PRELIMINARY REPORT******    ******PRELIMINARY REPORT******            INTERPRETATION:  no urgent/emergent findings.            ******PRELIMINARY REPORT******    ******PRELIMINARY REPORT******          PAPI SUE M.D., RADIOLOGY RESIDENT    < end of copied text >

## 2018-10-08 NOTE — H&P ADULT - PROBLEM SELECTOR PLAN 4
Transaminitis in hundreds seen on labs, unclear etiology   Likely 2/2 sepsis  Will continue to rend  Hold simvastatin for now

## 2018-10-08 NOTE — PATIENT PROFILE ADULT - DO YOU WANT TO COMPLETE THE HCP AND NAME A HEALTH CARE AGENT
Stroke Navigator Note    Patient: Gonsalo Tomas Date: 2018   : 1972 Attending: Leon Correia DO     Stroke Nurse Navigator signing off the case based on Dr. Correia's note 8/10/2018; \"MRI negative for stroke. Neurlogy feels presentation c/w atypical migraine.\" If the stroke RN is needed again please contact the stroke responder.    Fannie Mata, RN  Stroke Nurse Navigator     no

## 2018-10-08 NOTE — DISCHARGE NOTE ADULT - HOSPITAL COURSE
89 y/o M with PMHx significant for metastatic bladder cancer s/p TUBPT (5/16/18) and radiation therapy, TIA, seizure disorder, AFib (s/p pacemaker), HTN, HLD, and CHFpEF, with recent discharge for candida fungemis on IV fluconazole [facility], cough and abdominal pain x 1 day, admitted for sepsis of unclear etiology (TRANSFER FROM ).      + Pneumonia: Continue vanc, mayur, fluconazole     H&H: 12.2/35.9-->10.5/31.3  AST/ALT: 224/102-->184/74  RVP: neg  CXR: Clear lungs. No radiographic evidence of pneumonia.    10/8: CT Chest w/O cont: New multifocal consolidations throughout both lungs which may be due to infection or edema.   10/8: CT A/P w/O con: Partially imaged opacities in the bilateral lower lobes, R > L, new from prior study, and consistent with pneumonia.   Many hepatic lesions. This could be secondary to candidiasis or   metastatic disease.    10/8: ID: 3 more days of fluconazole. 2.MRSA nasal screen, blood and urine cultures  3.vanco/meropenem, if MRSA screen is negative I would stop vanco.   4. outlook guarded, we did not feel liver lesions reflected disseminated fungal infection.

## 2018-10-08 NOTE — CHART NOTE - NSCHARTNOTEFT_GEN_A_CORE
Patient's BP improved: 90s-100s/50-60s with good mental status. Patient verbalizing with family and myself. S/p midodrine 20 mg PO x 1. Patient in intermittent bradycardia HR 40s-70s. Likely 2/2 digoxin and Metoprolol (home medications). Given adverse side effect, will d/c midodrine order. Patient asymptomatic from bradycardia, able to reflex HR to 70s when talking. Patient SBP adequate with good mental status, however if hypotensive with poor mentation will require IV pressor support.     Joan Carreno, PGY2. Patient's BP improved: 90s-100s/50-60s with good mental status. Patient verbalizing with family and myself. S/p midodrine 20 mg PO x 1. Patient in intermittent bradycardia HR 40s-70s. EKG reviewed sinus bradycardia, with prolonged  ms. Likely 2/2 digoxin and Metoprolol (home medications). Given adverse side effect, will d/c midodrine order. Patient asymptomatic from bradycardia, able to reflex HR to 70s when talking. Patient SBP adequate with good mental status, however if hypotensive with poor mentation will require IV pressor support.     Joan Carreno, PGY2.

## 2018-10-08 NOTE — DISCHARGE NOTE ADULT - PATIENT PORTAL LINK FT
You can access the [facility] Patient Portal, offered by [facility], by registering with the following website: [email or web address]

## 2018-10-08 NOTE — H&P ADULT - PROBLEM SELECTOR PLAN 6
Hx of CHFpEF, severe diastolic dysfunction with severe pulm HTN  Patient currently slightly hypervolemic on exam, breathing comfortably  Will be cautious with further IVF   Hold all anti-HTNs for now

## 2018-10-08 NOTE — H&P ADULT - PROBLEM SELECTOR PLAN 7
Hx of PAF, currently in NSR, not on AC ue hx of hematuria sec to bladder ca s/p recent radiation therapy  Hold BB for now

## 2018-10-08 NOTE — H&P ADULT - HISTORY OF PRESENT ILLNESS
89M with PMHx significant for metastatic bladder cancer s/p TUBPT (5/16/18) and radiation therapy, TIA, seizure disorder, AFib (s/p pacemaker), HTN, HLD, and CHFpEF, with recent discharge for candida fungemis on IV fluconazole [facility] and abdominal pain x 1 day. Patient was recently admitted to the hospital and found to have candida parapsolosis fungemia and was started on IV fluconazole for treatment. During his previous hospitalization he was also found to have progression of his bladder cancer with new metastases to the liver. He was seen by oncology who said given his functional status, he was not a candidate for chemotherapy. Since his discharge to rehab, family and patient report no complaints. However, today patient began to have nausea with one episode of vomiting NBNB. associated with nausea and vomiting. The wife also noticed that his heart rate and blood pressure were fluctuating and then earlier this evening he became less responsive so they insisted he be brought to the hospital.    Upon arrival to the ED, the patient was febrile to 101.4 that subsequently angelica to 105.2. He was initially tachy to 104, /57 with a RR of 22 and sating 99% on 2L of NC. He was given one dose each of vancomycin and meropenem and started on IV fluconazole. He was given 1L of fluid and IV tylenol.      MICU consulted for sepsis.    On exam, patient is lethargic but responds to verbal stimulation and questioning. He denies any pain and states he is comfortable. 89M with PMHx significant for metastatic bladder cancer s/p TUBPT (5/16/18) and radiation therapy, TIA, seizure disorder, AFib (s/p pacemaker), HTN, HLD, and CHFpEF, with recent discharge for candida fungemis on IV fluconazole [facility] and abdominal pain x 1 day. Patient was recently admitted to the hospital and found to have candida parapsolosis fungemia and was started on IV fluconazole for treatment. During his previous hospitalization he was also found to have progression of his bladder cancer with new metastases to the liver. He was seen by oncology who said given his functional status, he was not a candidate for chemotherapy. Since his discharge to rehab, family and patient report no complaints. Today, patient was alert. However, began to feel nauseated with one episode of vomiting after drinking Glucerna. Per wife, patient had significant coughing after vomiting with difficulty breathing. He has been having loose BMs x 5 days, watery. Per wife, patient's heart rate and blood pressure were fluctuating HR 120s, SBP 180s. This evening, the patient became less responsive so they insisted he be brought to the hospital.    Upon arrival to the ED, the patient was febrile to 101.4 that subsequently angelica to 105.2. He was initially tachy to 104, /57 with a RR of 22 and sating 99% on 2L of NC. He was given one dose each of vancomycin and meropenem and started on IV fluconazole. He was given 1L of fluid and IV tylenol. MICU consulted for sepsis. 89M with PMHx significant for metastatic bladder cancer s/p TUBPT (5/16/18) and radiation therapy, TIA, seizure disorder, AFib (s/p pacemaker), HTN, HLD, and CHFpEF, with recent discharge for candida fungemis on IV fluconazole [facility] and abdominal pain x 1 day. Patient was recently admitted to the hospital and found to have candida parapsolosis fungemia and was started on IV fluconazole for treatment. During his previous hospitalization he was also found to have progression of his bladder cancer with new metastases to the liver.   He was seen by oncology who said given his functional status, he was not a candidate for chemotherapy. Since his discharge to rehab, family and patient report no complaints. Today, patient was alert. However, began to feel nauseated with one episode of vomiting after drinking Glucerna. Per wife, patient had significant coughing after vomiting with difficulty breathing. He has been having loose BMs x 5 days, watery. Per wife, patient's heart rate and blood pressure were fluctuating HR 120s, SBP 180s. This evening, the patient became less responsive so they insisted he be brought to the hospital.    Upon arrival to the ED, the patient was febrile to 101.4 that subsequently angelica to 105.2. He was initially tachy to 104, /57 with a RR of 22 and sating 99% on 2L of NC. He was given one dose each of vancomycin and meropenem and started on IV fluconazole. He was given 1L of fluid and IV tylenol. MICU consulted for sepsis.

## 2018-10-08 NOTE — H&P ADULT - ATTENDING COMMENTS
Patient seen, examined, and interviewed by me, case discussed with me, chart reviewed, agree with above H/P as reviewed and edited by me.  See  full note above.    pt more stable  sepsis due to pneumonia (+ CT, also pt's wife states pt's roommate in rehab had significant pulmonary symptoms and was on isolation!!)  continue broad spectrum abx / antifungals  discussed with ID: to evaluate and follow up today

## 2018-10-08 NOTE — ED ADULT NURSE REASSESSMENT NOTE - NS ED NURSE REASSESS COMMENT FT1
ER pt AOX 2 cooperative, Adm. to Med. coming for r/o Sepsis, with fever 100.4 Rectal early this AM pt denies CP/N/V /no abd. pain at present time. pt incontinence of urine,  IV to right later arm 20g angio cath in place area no swelling no erythema noted.  CT done.   skin intact.  pending Adm. bed.      Ainsley Smart RN

## 2018-10-08 NOTE — ED ADULT NURSE REASSESSMENT NOTE - NS ED NURSE REASSESS COMMENT FT1
Spoke with Dr. Carreno regarding pt bp and temp, will initiate fluid orders as well as tylenol. VS as noted. Pt changed and repositioned for comfort. NSR on cardiac monitor. Will monitor.

## 2018-10-08 NOTE — H&P ADULT - ASSESSMENT
89M with PMHx significant for metastatic bladder cancer s/p TUBPT (5/16/18) and radiation therapy, TIA, seizure disorder, AFib (s/p pacemaker), HTN, HLD, and CHFpEF, with recent discharge for candida fungemis on IV fluconazole presenting from [facility] with AMS, cough and abdominal pain x 1 day, admitted for sepsis of unclear etiology

## 2018-10-08 NOTE — CONSULT NOTE ADULT - SUBJECTIVE AND OBJECTIVE BOX
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 when he presented with hematuria.He was felt to be a poor candidate for treatment, wife sought second and ?3rd opinion and he reportedly received RT  a few months ago.He was admitted to [facility] a few weeks ago with low grade fever and lethargy associated with diarrhea.Blood cultures grew candida parapsilosis in 1/4 bottles , urine culture was negative and GI PCR panel, CDT,culture and O&P was negative.He was seen by Ocology, as expected CT scans showed progressive disease, especially in liver.He was advised palliative care.He was treated with micafungin and coverted to fluconazole, has completed 2 weeks.He had a negative optho exam and was discharged last week to a SNF.He had n/v and diarrhea yesterday, admitted, CT of C/A/P with new pulmonary infiltrates, differential is fluid overload vs pneumonia.No difficulty voiding, he appears comfortable at present.    REVIEW OF SYSTEMS:  All other review of systems negative (Comprehensive ROS)    PAST MEDICAL & SURGICAL HISTORY:  Cancer: prostate/bladder cancer  Pacemaker  HF (heart failure)  Seizure  Prostate cancer  BPH (benign prostatic hypertrophy)  PAF (paroxysmal atrial fibrillation)  TIA (Transient Ischemic Attack)  Prostate Ca: s/p seed implant  Hyperlipemia  HTN (Hypertension)  H/O prostate cancer: seed implant  No Past Surgical History      Allergies    ACE inhibitors (Other)    Intolerances        Antimicrobials Day #  :day 14 antifungal therapy, day 2 antibiotics  fluconAZOLE IVPB 400 milliGRAM(s) IV Intermittent every 24 hours  meropenem  IVPB 2000 milliGRAM(s) IV Intermittent every 8 hours  vancomycin  IVPB 1000 milliGRAM(s) IV Intermittent every 12 hours    Other Medications:  aspirin enteric coated 81 milliGRAM(s) Oral daily  digoxin     Tablet 0.125 milliGRAM(s) Oral daily  heparin  Injectable 5000 Unit(s) SubCutaneous every 8 hours  levETIRAcetam 500 milliGRAM(s) Oral two times a day  sodium chloride 0.9%. 1000 milliLiter(s) IV Continuous <Continuous>  tamsulosin 0.4 milliGRAM(s) Oral at bedtime      FAMILY HISTORY:  No pertinent family history in first degree relatives      SOCIAL HISTORY:  Smoking:  ex   ETOH: no    Drug Use: no        T(F): 97.8 (10-08-18 @ 12:31), Max: 105.2 (10-07-18 @ 19:07)  HR: 88 (10-08-18 @ 12:31)  BP: 125/47 (10-08-18 @ 12:31)  RR: 18 (10-08-18 @ 12:31)  SpO2: 95% (10-08-18 @ 12:31)  Wt(kg): --    PHYSICAL EXAM:  General: alert, no acute distress  Eyes:  anicteric, no conjunctival injection, no discharge  Oropharynx: no lesions or injection 	  Neck: supple, without adenopathy  Lungs: clear to auscultation, poor inspiratory effert  Heart: irregular rate and rhythm; no murmur, rubs or gallops  Abdomen: soft,distended, nontender, without mass or organomegaly  Skin: no lesions  Extremities: no clubbing, cyanosis,+ edema  Neurologic: alert, oriented, moves all extremities, slow mentation    LAB RESULTS:                        10.5   8.43  )-----------( 184      ( 08 Oct 2018 06:00 )             31.3     10-08    136  |  98  |  17  ----------------------------<  97  4.5   |  22  |  0.98    Ca    9.4      08 Oct 2018 06:00    TPro  5.9<L>  /  Alb  2.6<L>  /  TBili  1.3<H>  /  DBili  x   /  AST  184<H>  /  ALT  74<H>  /  AlkPhos  188<H>  10-08    LIVER FUNCTIONS - ( 08 Oct 2018 06:00 )  Alb: 2.6 g/dL / Pro: 5.9 g/dL / ALK PHOS: 188 u/L / ALT: 74 u/L / AST: 184 u/L / GGT: x           Urinalysis Basic - ( 07 Oct 2018 21:50 )    Color: YELLOW / Appearance: CLEAR / S.033 / pH: 6.0  Gluc: NEGATIVE / Ketone: TRACE  / Bili: NEGATIVE / Urobili: 2   Blood: TRACE / Protein: 70 / Nitrite: NEGATIVE   Leuk Esterase: NEGATIVE / RBC: x / WBC 10-25   Sq Epi: x / Non Sq Epi: 5-10 / Bacteria: x        MICROBIOLOGY:  RECENT CULTURES:        RADIOLOGY REVIEWED:  < from: CT Chest No Cont (10.08.18 @ 09:06) >  IMPRESSION:    When compared with 2018:    New multifocal consolidations throughout both lungs which may be due to   infection or edema. Follow-up to ensure resolution.    < end of copied text >  < from: CT Abdomen and Pelvis No Cont (10.08.18 @ 04:05) >  IMPRESSION:   Partially imaged opacities in the bilateral lower lobes, R > L, new from   prior study, and consistent with pneumonia.   Many hepatic lesions. This could be secondary to candidiasis or   metastatic disease. 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 when he presented with hematuria.He was felt to be a poor candidate for treatment, wife sought second and ?3rd opinion and he reportedly received RT  a few months ago.He was admitted to [facility] a few weeks ago with low grade fever and lethargy associated with diarrhea.Blood cultures grew candida parapsilosis in 1/4 bottles , urine culture was negative and GI PCR panel, CDT,culture and O&P was negative.He was seen by Ocology, as expected CT scans showed progressive disease, especially in liver.He was advised palliative care.He was treated with micafungin and coverted to fluconazole, has completed 11 daysHe had a negative optho exam and was discharged last week to a SNF.He had n/v and diarrhea yesterday, admitted, CT of C/A/P with new pulmonary infiltrates, differential is fluid overload vs pneumonia.No difficulty voiding, he appears comfortable at present.    REVIEW OF SYSTEMS:  All other review of systems negative (Comprehensive ROS)    PAST MEDICAL & SURGICAL HISTORY:  Cancer: prostate/bladder cancer  Pacemaker  HF (heart failure)  Seizure  Prostate cancer  BPH (benign prostatic hypertrophy)  PAF (paroxysmal atrial fibrillation)  TIA (Transient Ischemic Attack)  Prostate Ca: s/p seed implant  Hyperlipemia  HTN (Hypertension)  H/O prostate cancer: seed implant  No Past Surgical History      Allergies    ACE inhibitors (Other)    Intolerances        Antimicrobials Day #  :day 11 antifungal therapy, day 2 antibiotics  fluconAZOLE IVPB 400 milliGRAM(s) IV Intermittent every 24 hours  meropenem  IVPB 2000 milliGRAM(s) IV Intermittent every 8 hours  vancomycin  IVPB 1000 milliGRAM(s) IV Intermittent every 12 hours    Other Medications:  aspirin enteric coated 81 milliGRAM(s) Oral daily  digoxin     Tablet 0.125 milliGRAM(s) Oral daily  heparin  Injectable 5000 Unit(s) SubCutaneous every 8 hours  levETIRAcetam 500 milliGRAM(s) Oral two times a day  sodium chloride 0.9%. 1000 milliLiter(s) IV Continuous <Continuous>  tamsulosin 0.4 milliGRAM(s) Oral at bedtime      FAMILY HISTORY:  No pertinent family history in first degree relatives      SOCIAL HISTORY:  Smoking:  ex   ETOH: no    Drug Use: no        T(F): 97.8 (10-08-18 @ 12:31), Max: 105.2 (10-07-18 @ 19:07)  HR: 88 (10-08-18 @ 12:31)  BP: 125/47 (10-08-18 @ 12:31)  RR: 18 (10-08-18 @ 12:31)  SpO2: 95% (10-08-18 @ 12:31)  Wt(kg): --    PHYSICAL EXAM:  General: alert, no acute distress  Eyes:  anicteric, no conjunctival injection, no discharge  Oropharynx: no lesions or injection 	  Neck: supple, without adenopathy  Lungs: clear to auscultation, poor inspiratory effert  Heart: irregular rate and rhythm; no murmur, rubs or gallops  Abdomen: soft,distended, nontender, without mass or organomegaly  Skin: no lesions  Extremities: no clubbing, cyanosis,+ edema  Neurologic: alert, oriented, moves all extremities, slow mentation    LAB RESULTS:                        10.5   8.43  )-----------( 184      ( 08 Oct 2018 06:00 )             31.3     10-08    136  |  98  |  17  ----------------------------<  97  4.5   |  22  |  0.98    Ca    9.4      08 Oct 2018 06:00    TPro  5.9<L>  /  Alb  2.6<L>  /  TBili  1.3<H>  /  DBili  x   /  AST  184<H>  /  ALT  74<H>  /  AlkPhos  188<H>  10-08    LIVER FUNCTIONS - ( 08 Oct 2018 06:00 )  Alb: 2.6 g/dL / Pro: 5.9 g/dL / ALK PHOS: 188 u/L / ALT: 74 u/L / AST: 184 u/L / GGT: x           Urinalysis Basic - ( 07 Oct 2018 21:50 )    Color: YELLOW / Appearance: CLEAR / S.033 / pH: 6.0  Gluc: NEGATIVE / Ketone: TRACE  / Bili: NEGATIVE / Urobili: 2   Blood: TRACE / Protein: 70 / Nitrite: NEGATIVE   Leuk Esterase: NEGATIVE / RBC: x / WBC 10-25   Sq Epi: x / Non Sq Epi: 5-10 / Bacteria: x        MICROBIOLOGY:  RECENT CULTURES:        RADIOLOGY REVIEWED:  < from: CT Chest No Cont (10.08.18 @ 09:06) >  IMPRESSION:    When compared with 2018:    New multifocal consolidations throughout both lungs which may be due to   infection or edema. Follow-up to ensure resolution.    < end of copied text >  < from: CT Abdomen and Pelvis No Cont (10.08.18 @ 04:05) >  IMPRESSION:   Partially imaged opacities in the bilateral lower lobes, R > L, new from   prior study, and consistent with pneumonia.   Many hepatic lesions. This could be secondary to candidiasis or   metastatic disease.

## 2018-10-08 NOTE — CONSULT NOTE ADULT - ASSESSMENT
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  He is on day 11/14 fluconazole for low grade candida parapsilosis fungemia, cryptogenic focus but rapidly controlled without any evidence of  end organ involvement.His fungemia cleared prior to antifungal therapy.  He appears clinically at his baseline, which is why palliative approach has been advised by his oncologist.  He may have a pneumonia and based on CT one can justify use of antibiotics.  Suggest.  1. 3 more days of fluconazole  2.MRSA nasal screen  3.vanco/meropenem, if MRSA screen is negative I would stop vanco  4.outlook guarded, we did not feel liver lesions reflected disseminated fungal infection.  5.Wife updated at bedside, she may be interested in additional treatment for her . 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  He is on day 11/14 fluconazole for low grade candida parapsilosis fungemia, cryptogenic focus but rapidly controlled without any evidence of  end organ involvement.His fungemia cleared prior to antifungal therapy.  He appears clinically at his baseline, which is why palliative approach has been advised by his oncologist.  He may have a pneumonia and based on CT one can justify use of antibiotics.  Suggest.  1. 3 more days of fluconazole  2.MRSA nasal screen, blood and urine cultures  3.vanco/meropenem, if MRSA screen is negative I would stop vanco  4.outlook guarded, we did not feel liver lesions reflected disseminated fungal infection.  5.Wife updated at bedside, she may be interested in additional treatment for her .

## 2018-10-08 NOTE — H&P ADULT - PROBLEM SELECTOR PLAN 5
Hx of candida bacteremia, previously on fluconazole  Will continue fluconazole IV   F/u blood cx Hx of candida bacteremia, previously on fluconazole  Will continue fluconazole IV (previous last date 10/10)  F/u blood cx

## 2018-10-08 NOTE — H&P ADULT - PROBLEM SELECTOR PLAN 10
Continue Keppra 500 BID home medication  No active issues    Extensive discussion with wife and son and patient at bedside, would want all aggressive measure including IV pressor support, intubation, debilitation, CPR. Family aware of prognosis given severity of sepsis. Patient remains FULL CODE.

## 2018-10-08 NOTE — H&P ADULT - PROBLEM SELECTOR PLAN 3
Productive cough, XRAY no evidence of consolidation, however +sick contacts  F/u RVP  Lung US evidence of B-lines anteriorly will be cautious with further IVF  Treat for HCAP  Sputum cx Productive cough, XRAY no evidence of consolidation, however +sick contacts  F/u RVP  Lung US evidence of B-lines anteriorly will be cautious with further IVF  Treat for HCAP, once stable consider CT chest given report of coughing while eating, also concern for aspiration PNA  S+S consult  Sputum cx

## 2018-10-08 NOTE — PATIENT PROFILE ADULT - CAREGIVER ADDRESS
[address] [address] [id number] [address] [facility] [place] [address]

## 2018-10-09 LAB
BACTERIA UR CULT: SIGNIFICANT CHANGE UP
L PNEUMO AG UR QL: NEGATIVE — SIGNIFICANT CHANGE UP
MRSA SPEC QL CULT: SIGNIFICANT CHANGE UP
SPECIMEN SOURCE: SIGNIFICANT CHANGE UP

## 2018-10-09 RX ORDER — METOPROLOL TARTRATE 50 MG
100 TABLET ORAL DAILY
Qty: 0 | Refills: 0 | Status: DISCONTINUED | OUTPATIENT
Start: 2018-10-09 | End: 2018-10-12

## 2018-10-09 RX ORDER — METOPROLOL TARTRATE 50 MG
1 TABLET ORAL
Qty: 0 | Refills: 0 | COMMUNITY

## 2018-10-09 RX ORDER — SODIUM CHLORIDE 9 MG/ML
1000 INJECTION INTRAMUSCULAR; INTRAVENOUS; SUBCUTANEOUS
Qty: 0 | Refills: 0 | Status: DISCONTINUED | OUTPATIENT
Start: 2018-10-09 | End: 2018-10-12

## 2018-10-09 RX ADMIN — LEVETIRACETAM 500 MILLIGRAM(S): 250 TABLET, FILM COATED ORAL at 05:43

## 2018-10-09 RX ADMIN — TAMSULOSIN HYDROCHLORIDE 0.4 MILLIGRAM(S): 0.4 CAPSULE ORAL at 21:59

## 2018-10-09 RX ADMIN — MEROPENEM 200 MILLIGRAM(S): 1 INJECTION INTRAVENOUS at 12:58

## 2018-10-09 RX ADMIN — Medication 81 MILLIGRAM(S): at 12:42

## 2018-10-09 RX ADMIN — SODIUM CHLORIDE 50 MILLILITER(S): 9 INJECTION INTRAMUSCULAR; INTRAVENOUS; SUBCUTANEOUS at 12:43

## 2018-10-09 RX ADMIN — Medication 250 MILLIGRAM(S): at 09:08

## 2018-10-09 RX ADMIN — HEPARIN SODIUM 5000 UNIT(S): 5000 INJECTION INTRAVENOUS; SUBCUTANEOUS at 12:42

## 2018-10-09 RX ADMIN — MEROPENEM 200 MILLIGRAM(S): 1 INJECTION INTRAVENOUS at 05:43

## 2018-10-09 RX ADMIN — HEPARIN SODIUM 5000 UNIT(S): 5000 INJECTION INTRAVENOUS; SUBCUTANEOUS at 21:59

## 2018-10-09 RX ADMIN — HEPARIN SODIUM 5000 UNIT(S): 5000 INJECTION INTRAVENOUS; SUBCUTANEOUS at 05:47

## 2018-10-09 RX ADMIN — Medication 0.12 MILLIGRAM(S): at 05:43

## 2018-10-09 RX ADMIN — FLUCONAZOLE 100 MILLIGRAM(S): 150 TABLET ORAL at 22:04

## 2018-10-09 RX ADMIN — Medication 100 MILLIGRAM(S): at 12:43

## 2018-10-09 NOTE — PROGRESS NOTE ADULT - ASSESSMENT
_________________________________________________________________________________________  ========>>  M E D I C A L   A T T E N D I N G    F O L L O W  U P  N O T E  <<=========  -----------------------------------------------------------------------------------------------------    - Patient seen and examined by me approximately thirty minutes ago.  - In summary,  JESSICA DOWD is a 89y year old man who originally presented with ams, cough, weakness  - Patient today overall doing ok, comfortable, eating fairly per wife     ==================>> REVIEW OF SYSTEM <<=================    GEN: no fever, no chills, no pain at present  RESP: no SOB, no cough, no sputum  CVS: no chest pain, no palpitations, no edema  GI: no abdominal pain, no nausea, +  diarrhea  : no dysuria, no frequency, no hematuria  Neuro: no headache, no dizziness  Derm : no itching, no rash    ==================>> PHYSICAL EXAM <<=================    GEN: A&O X 3 , NAD , comfortable, weak appearing   HEENT: NCAT, PERRL, MMM, hearing intact  Neck: supple , no JVD  CVS: S1S2 , regular , No M/R/G appreciated 9limited as pt not taking deep breaths)   PULM: CTA B/L,  no W/R/R appreciated  ABD.: soft. non tender, non distended,  bowel sounds present  Extrem: intact pulses , trace edema   PSYCH : normal mood,  not anxious      ==================>> MEDICATIONS <<====================    MEDICATIONS  (STANDING):  aspirin enteric coated 81 milliGRAM(s) Oral daily  digoxin     Tablet 0.125 milliGRAM(s) Oral daily  fluconAZOLE IVPB 400 milliGRAM(s) IV Intermittent every 24 hours  heparin  Injectable 5000 Unit(s) SubCutaneous every 8 hours  levETIRAcetam 500 milliGRAM(s) Oral two times a day  meropenem  IVPB 2000 milliGRAM(s) IV Intermittent every 8 hours  metoprolol succinate  milliGRAM(s) Oral daily  sodium chloride 0.9%. 1000 milliLiter(s) (50 mL/Hr) IV Continuous <Continuous>  tamsulosin 0.4 milliGRAM(s) Oral at bedtime    ==================>> VITAL SIGNS <<==================    T(C): 37.1 (10-09-18 @ 05:41), Max: 37.6 (10-08-18 @ 20:41)  HR: 98 (10-09-18 @ 05:41) (92 - 98)  BP: 148/65 (10-09-18 @ 05:41) (147/49 - 170/60)  RR: 18 (10-09-18 @ 05:41) (18 - 20)  SpO2: 97% (10-09-18 @ 05:41) (95% - 98%)     ==================>> LAB AND IMAGING <<==================                        10.5   8.43  )-----------( 184      ( 08 Oct 2018 06:00 )             31.3        WBC count:   8.43 <<== ,  5.75 <<==     136  |  98  |  17  ----------------------------<  97  4.5   |  22  |  0.98    Ca    9.4      08 Oct 2018 06:00    TPro  5.9<L>  /  Alb  2.6<L>  /  TBili  1.3<H>  /  DBili  x   /  AST  184<H>  /  ALT  74<H>  /  AlkPhos  188<H>  10-08       AST:          184 <== , 224 <==      ALT:        74  <== , 102  <==      AP:        188  <=, 281  <=     Bili:        1.3  <=, 1.0  <=    PT/INR - ( 07 Oct 2018 18:44 )   PT: 14.5 SEC;   INR: 1.30     PTT - ( 07 Oct 2018 18:44 )  PTT:30.3 SEC               Urinalysis Basic - ( 07 Oct 2018 21:50 )  Color: YELLOW / Appearance: CLEAR / S.033 / pH: 6.0  Gluc: NEGATIVE / Ketone: TRACE  / Bili: NEGATIVE / Urobili: 2   Blood: TRACE / Protein: 70 / Nitrite: NEGATIVE   Leuk Esterase: NEGATIVE / RBC: x / WBC 10-25   Sq Epi: x / Non Sq Epi: 5-10 / Bacteria: x    TSH:      2.67   (18)           < from: CT Chest No Cont (10.08.18 @ 09:06) >  IMPRESSION:  When compared with 2018:  New multifocal consolidations throughout both lungs which may be due to   infection or edema. Follow-up to ensure resolution.  < end of copied text >    _______________________  C U L T U R E S :    Culture - Blood (collected 07 Oct 2018 22:59)  Source: BLOOD VENOUS  Preliminary Report (08 Oct 2018 22:59):    NO ORGANISMS ISOLATED    NO ORGANISMS ISOLATED AT 24 HOURS    Culture - Urine (collected 07 Oct 2018 22:21)  Source: URINE MIDSTREAM  Final Report (09 Oct 2018 08:23):    NO GROWTH AT 24 HOURS    Culture - Blood (collected 07 Oct 2018 21:11)  Source: BLOOD PERIPHERAL  Preliminary Report (08 Oct 2018 21:11):    NO ORGANISMS ISOLATED    NO ORGANISMS ISOLATED AT 24 HOURS    ___________________________________________________________________________________  ===============>>  A S S E S S M E N T   A N D   P L A N <<===============  ------------------------------------------------------------------------------------------      · Assessment		  89M with PMHx significant for metastatic bladder cancer s/p TUBPT (18) and radiation therapy, TIA, seizure disorder, AFib (s/p pacemaker), HTN, HLD, and CHFpEF, with recent discharge for candida fungemis on IV fluconazole [facility], cough and abdominal pain x 1 day, admitted for sepsis of unclear etiology     Problem/Plan - 1:  ·  Problem: Sepsis, resolving/ improved   pt with likely HCAP based on CT results as above   DCed Vancomycin given negative MRSA screen  continue Meropenem and Fluconazole  ID consult and mgmt appreciated  supportive care     Problem/Plan - 2:  ·  Problem: Diarrhea, unspecified type.    CT A/P not showing any abnormalities  CDIFF negative, pending further stool studies   GI eval if not improved  encourage Po intake     Problem/Plan - 3:  ·  Problem: Transaminitis, improved   abnormal appearance on CT scan: likely representing known metastasis from bladder cancer   Will continue to trend  Hold simvastatin for now.     Problem/Plan - 4:  ·  Problem: h/o recent fungemia  Will continue fluconazole IV and finish as planned per ID    Problem/Plan - 5:  Problem: Acute on chronic combined systolic and diastolic heart failure. in pt with h/o CHFpEF, severe diastolic dysfunction with severe pulm HTN  Patient currently breathing comfortably / better  staart resuming meds as BP allows ( D/W Np)      Problem/Plan - 6:  ·  Problem: PAF, not on AC ue hx of hematuria sec to bladder ca s/p recent radiation therapy  resume BB as BP allows     Problem/Plan - 7:  ·  Problem: h/o hypertension  Held all anti-HTNS in the setting of sepsis  monitor VS. and resume meds as above as able     Problem/Plan - 9:  Problem: Seizure.   Continue Keppra 500 BID home medication  monitor     -GI/DVT Prophylaxis.    --------------------------------------------  Case discussed with pt, wife, N  Education given on findings and plan of care  ___________________________  SERA. EMELY Xie.  Pager: [phone]

## 2018-10-09 NOTE — PHYSICAL THERAPY INITIAL EVALUATION ADULT - PERTINENT HX OF CURRENT PROBLEM, REHAB EVAL
Pt. is a 89 year old male admitted to [facility] secondary to sepsis. PMH: cancer, PPM, HF, TIA, HTN.

## 2018-10-09 NOTE — PROVIDER CONTACT NOTE (OTHER) - SITUATION
No s/s of acute distress. Denies pain, SOB, headache, lightheadedness, dizziness. No nonverbal s/s of pain.

## 2018-10-09 NOTE — PHYSICAL THERAPY INITIAL EVALUATION ADULT - LEVEL OF INDEPENDENCE, REHAB EVAL
unable to perform functional mobility at this time secondary to pt. lethargic presenting with difficulty participating in session. Will assess functional mobility when feasible

## 2018-10-09 NOTE — PHYSICAL THERAPY INITIAL EVALUATION ADULT - DISCHARGE DISPOSITION, PT EVAL
rehabilitation facility TBD upon further assessment of functional mobility. Please refer to PT notes for further assessment when feasible.

## 2018-10-09 NOTE — PHYSICAL THERAPY INITIAL EVALUATION ADULT - ADDITIONAL COMMENTS
as per wife, Pt lives in a private apartment with his spouse. Pt. was previously independent with all ADLs and independently ambulating without an assistive device. Pt. left supine in bed with all tubes/lines intact, call bell in reach and in NAD. Pt. admitted to [facility] from [facility]. as per wife, Pt lives in a private apartment with his spouse. Pt. was previously independent with all ADLs and independently ambulating without an assistive device. Pt. left supine in bed with all tubes/lines intact, call bell in reach and in NAD.

## 2018-10-10 LAB
ALBUMIN SERPL ELPH-MCNC: 2.9 G/DL — LOW (ref 3.3–5)
ALP SERPL-CCNC: 174 U/L — HIGH (ref 40–120)
ALT FLD-CCNC: 40 U/L — SIGNIFICANT CHANGE UP (ref 4–41)
AST SERPL-CCNC: 89 U/L — HIGH (ref 4–40)
BASOPHILS # BLD AUTO: 0.05 K/UL — SIGNIFICANT CHANGE UP (ref 0–0.2)
BASOPHILS NFR BLD AUTO: 0.6 % — SIGNIFICANT CHANGE UP (ref 0–2)
BILIRUB SERPL-MCNC: 2 MG/DL — HIGH (ref 0.2–1.2)
BUN SERPL-MCNC: 22 MG/DL — SIGNIFICANT CHANGE UP (ref 7–23)
CALCIUM SERPL-MCNC: 9.5 MG/DL — SIGNIFICANT CHANGE UP (ref 8.4–10.5)
CHLORIDE SERPL-SCNC: 98 MMOL/L — SIGNIFICANT CHANGE UP (ref 98–107)
CO2 SERPL-SCNC: 24 MMOL/L — SIGNIFICANT CHANGE UP (ref 22–31)
CREAT SERPL-MCNC: 0.82 MG/DL — SIGNIFICANT CHANGE UP (ref 0.5–1.3)
EOSINOPHIL # BLD AUTO: 0.07 K/UL — SIGNIFICANT CHANGE UP (ref 0–0.5)
EOSINOPHIL NFR BLD AUTO: 0.8 % — SIGNIFICANT CHANGE UP (ref 0–6)
GLUCOSE SERPL-MCNC: 159 MG/DL — HIGH (ref 70–99)
HCT VFR BLD CALC: 32.1 % — LOW (ref 39–50)
HGB BLD-MCNC: 10.5 G/DL — LOW (ref 13–17)
IMM GRANULOCYTES # BLD AUTO: 0.77 # — SIGNIFICANT CHANGE UP
IMM GRANULOCYTES NFR BLD AUTO: 8.9 % — HIGH (ref 0–1.5)
LYMPHOCYTES # BLD AUTO: 0.93 K/UL — LOW (ref 1–3.3)
LYMPHOCYTES # BLD AUTO: 10.7 % — LOW (ref 13–44)
MAGNESIUM SERPL-MCNC: 1.8 MG/DL — SIGNIFICANT CHANGE UP (ref 1.6–2.6)
MCHC RBC-ENTMCNC: 28 PG — SIGNIFICANT CHANGE UP (ref 27–34)
MCHC RBC-ENTMCNC: 32.7 % — SIGNIFICANT CHANGE UP (ref 32–36)
MCV RBC AUTO: 85.6 FL — SIGNIFICANT CHANGE UP (ref 80–100)
MONOCYTES # BLD AUTO: 0.91 K/UL — HIGH (ref 0–0.9)
MONOCYTES NFR BLD AUTO: 10.5 % — SIGNIFICANT CHANGE UP (ref 2–14)
NEUTROPHILS # BLD AUTO: 5.96 K/UL — SIGNIFICANT CHANGE UP (ref 1.8–7.4)
NEUTROPHILS NFR BLD AUTO: 68.5 % — SIGNIFICANT CHANGE UP (ref 43–77)
NRBC # FLD: 0.06 — SIGNIFICANT CHANGE UP
PHOSPHATE SERPL-MCNC: 2 MG/DL — LOW (ref 2.5–4.5)
PLATELET # BLD AUTO: 182 K/UL — SIGNIFICANT CHANGE UP (ref 150–400)
PMV BLD: 10.4 FL — SIGNIFICANT CHANGE UP (ref 7–13)
POTASSIUM SERPL-MCNC: 3.8 MMOL/L — SIGNIFICANT CHANGE UP (ref 3.5–5.3)
POTASSIUM SERPL-SCNC: 3.8 MMOL/L — SIGNIFICANT CHANGE UP (ref 3.5–5.3)
PROT SERPL-MCNC: 6.6 G/DL — SIGNIFICANT CHANGE UP (ref 6–8.3)
RBC # BLD: 3.75 M/UL — LOW (ref 4.2–5.8)
RBC # FLD: 14.3 % — SIGNIFICANT CHANGE UP (ref 10.3–14.5)
SODIUM SERPL-SCNC: 137 MMOL/L — SIGNIFICANT CHANGE UP (ref 135–145)
WBC # BLD: 8.69 K/UL — SIGNIFICANT CHANGE UP (ref 3.8–10.5)
WBC # FLD AUTO: 8.69 K/UL — SIGNIFICANT CHANGE UP (ref 3.8–10.5)

## 2018-10-10 PROCEDURE — 71045 X-RAY EXAM CHEST 1 VIEW: CPT | Mod: 26

## 2018-10-10 PROCEDURE — 70450 CT HEAD/BRAIN W/O DYE: CPT | Mod: 26

## 2018-10-10 RX ORDER — IPRATROPIUM/ALBUTEROL SULFATE 18-103MCG
3 AEROSOL WITH ADAPTER (GRAM) INHALATION ONCE
Qty: 0 | Refills: 0 | Status: COMPLETED | OUTPATIENT
Start: 2018-10-10 | End: 2018-10-11

## 2018-10-10 RX ADMIN — HEPARIN SODIUM 5000 UNIT(S): 5000 INJECTION INTRAVENOUS; SUBCUTANEOUS at 21:58

## 2018-10-10 RX ADMIN — MEROPENEM 200 MILLIGRAM(S): 1 INJECTION INTRAVENOUS at 06:24

## 2018-10-10 RX ADMIN — MEROPENEM 200 MILLIGRAM(S): 1 INJECTION INTRAVENOUS at 21:58

## 2018-10-10 RX ADMIN — Medication 81 MILLIGRAM(S): at 11:40

## 2018-10-10 RX ADMIN — LEVETIRACETAM 500 MILLIGRAM(S): 250 TABLET, FILM COATED ORAL at 16:52

## 2018-10-10 RX ADMIN — Medication 100 MILLIGRAM(S): at 06:24

## 2018-10-10 RX ADMIN — LEVETIRACETAM 500 MILLIGRAM(S): 250 TABLET, FILM COATED ORAL at 06:24

## 2018-10-10 RX ADMIN — MEROPENEM 200 MILLIGRAM(S): 1 INJECTION INTRAVENOUS at 00:07

## 2018-10-10 RX ADMIN — MEROPENEM 200 MILLIGRAM(S): 1 INJECTION INTRAVENOUS at 11:40

## 2018-10-10 RX ADMIN — HEPARIN SODIUM 5000 UNIT(S): 5000 INJECTION INTRAVENOUS; SUBCUTANEOUS at 11:40

## 2018-10-10 RX ADMIN — TAMSULOSIN HYDROCHLORIDE 0.4 MILLIGRAM(S): 0.4 CAPSULE ORAL at 21:58

## 2018-10-10 RX ADMIN — Medication 0.12 MILLIGRAM(S): at 06:24

## 2018-10-10 RX ADMIN — HEPARIN SODIUM 5000 UNIT(S): 5000 INJECTION INTRAVENOUS; SUBCUTANEOUS at 06:24

## 2018-10-10 NOTE — PROGRESS NOTE ADULT - ASSESSMENT
_________________________________________________________________________________________  ========>>  M E D I C A L   A T T E N D I N G    F O L L O W  U P  N O T E  <<=========  -----------------------------------------------------------------------------------------------------    - Patient seen and examined by me earlier today.   - In summary,  JESSICA DOWD is a 89y year old man who originally presented with ams, cough, weakness  - Patient today overall doing ok, comfortable, eating poorly per son and Patient seen, examined, and interviewed by me, case discussed with me, chart reviewed, agree with above H/P as reviewed and edited by me.  See  full note above. voice/ speech has weakened today per son, asking for speech/swalow eval     ==================>> REVIEW OF SYSTEM <<=================    (limited ROS)  GEN: denies pain or discomfort   RESP: denies SOB ( although has some degree of abdominal breathing )  CVS: denies chest pain  GI: denies abdominal pain, nausea  Neuro: no headache, no dizziness  Derm : no itching    ==================>> PHYSICAL EXAM <<=================    GEN: A&O X 2 , NAD , comfortable, weak appearing , weak speech   HEENT: NCAT, PERRL, MMM, hearing intact  Neck: supple , no JVD  CVS: S1S2 , regular , No M/R/G appreciated 9limited as pt not taking deep breaths)   PULM: + b/l crackles   ABD.: soft. non tender, non distended,  bowel sounds present  Extrem: intact pulses ,+  trace edema   PSYCH : normal mood,  not anxious      ==================>> MEDICATIONS <<====================    aspirin enteric coated 81 milliGRAM(s) Oral daily  digoxin     Tablet 0.125 milliGRAM(s) Oral daily  fluconAZOLE IVPB 400 milliGRAM(s) IV Intermittent every 24 hours  heparin  Injectable 5000 Unit(s) SubCutaneous every 8 hours  levETIRAcetam 500 milliGRAM(s) Oral two times a day  meropenem  IVPB 2000 milliGRAM(s) IV Intermittent every 8 hours  metoprolol succinate  milliGRAM(s) Oral daily  sodium chloride 0.9%. 1000 milliLiter(s) IV Continuous <Continuous>  tamsulosin 0.4 milliGRAM(s) Oral at bedtime    ==================>> VITAL SIGNS <<==================    Vital Signs Last 24 Hrs  T(C): 37.1 (10-10-18 @ 13:44)  T(F): 98.7 (10-10-18 @ 13:44), Max: 99.5 (10-09-18 @ 22:18)  HR: 103 (10-10-18 @ 13:44) (85 - 103)  BP: 154/67 (10-10-18 @ 13:44)  RR: 18 (10-10-18 @ 13:44) (18 - 18)  SpO2: 91% (10-10-18 @ 13:44) (91% - 96%)       ==================>> LAB AND IMAGING <<==================                        10.5   8.69  )-----------( 182      ( 10 Oct 2018 14:10 )             32.1        137  |  98  |  22  ----------------------------<  159<H>  3.8   |  24  |  0.82    Ca    9.5      10 Oct 2018 14:10  Phos  2.0     10-10  Mg     1.8     10-10    TPro  6.6  /  Alb  2.9<L>  /  TBili  2.0<H>  /  DBili  x   /  AST  89<H>  /  ALT  40  /  AlkPhos  174<H>  10-10    < from: CT Chest No Cont (10.08.18 @ 09:06) >  IMPRESSION:  When compared with September 26, 2018:  New multifocal consolidations throughout both lungs which may be due to   infection or edema. Follow-up to ensure resolution.  < end of copied text >    _______________________  C U L T U R E S :    Culture - Blood (collected 07 Oct 2018 22:59)  Source: BLOOD VENOUS  Preliminary Report (09 Oct 2018 22:58):    NO ORGANISMS ISOLATED    NO ORGANISMS ISOLATED AT 48 HRS.    Culture - Urine (collected 07 Oct 2018 22:21)  Source: URINE MIDSTREAM  Final Report (09 Oct 2018 08:23):    NO GROWTH AT 24 HOURS    Culture - Blood (collected 07 Oct 2018 21:11)  Source: BLOOD PERIPHERAL  Preliminary Report (09 Oct 2018 21:11):    NO ORGANISMS ISOLATED    NO ORGANISMS ISOLATED AT 48 HRS.    ___________________________________________________________________________________  ===============>>  A S S E S S M E N T   A N D   P L A N <<===============  ------------------------------------------------------------------------------------------      · Assessment		  89M with PMHx significant for metastatic bladder cancer s/p TUBPT (5/16/18) and radiation therapy, TIA, seizure disorder, AFib (s/p pacemaker), HTN, HLD, and CHFpEF, with recent discharge for candida fungemis on IV fluconazole [facility], cough and abdominal pain x 1 day, admitted for sepsis of unclear etiology     Problem/Plan - 1:  ·  Problem: Sepsis, resolving/ improved   pt with likely HCAP based on CT results as above   continue Meropenem and Fluconazole ( for fungemia)   ID consult and mgmt appreciated  supportive care     Problem/Plan - 2:  ·  Problem: Diarrhea, seems to have improved     CT A/P not showing any abnormalities  CDIFF negative  GI eval if worsened  encourage Po intake as able  S/S eval  ( I had a long discussion with pt's son about goals of care, pt's overall weakened sate and possible failure of swallow eval and pt not being a PEG candidate all questions answered son to discuss with pt's wife as well     Problem/Plan - 3:  ·  Problem: Transaminitis, improved   abnormal appearance on CT scan: likely representing known metastasis from bladder cancer   Will continue to trend  Hold simvastatin for now.   I discussed with Hem/onc ( Dr Turner), in agreement with pt's overall poor prognosis   brought up paliative/comfort measures pt's family to discuss     Problem/Plan - 4:  ·  Problem: h/o recent fungemia  Will continue fluconazole IV and finish as planned per ID    Problem/Plan - 5:  Problem: Acute on chronic combined systolic and diastolic heart failure. in pt with h/o CHFpEF, severe diastolic dysfunction with severe pulm HTN  Patient currently breathing comfortably / better  Continue Current medications and monitor.     Problem/Plan - 6:  ·  Problem: PAF, not on AC ue hx of hematuria sec to bladder ca s/p recent radiation therapy  resume BB as BP allows     Problem/Plan - 7:  ·  Problem: h/o hypertension  Held all anti-HTNS in the setting of sepsis  monitor VS. and resume meds as above as able     Problem/Plan - 9:  Problem: Seizure.   Continue Keppra 500 BID home medication  monitor     -GI/DVT Prophylaxis.    --------------------------------------------  Case discussed with pt, wife, N  Education given on findings and plan of care  ___________________________  H. EMELY Xie.  Pager: [phone]

## 2018-10-10 NOTE — PROGRESS NOTE ADULT - ASSESSMENT
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  He is on day 11/14 fluconazole for low grade candida parapsilosis fungemia, cryptogenic focus but rapidly controlled without any evidence of  end organ involvement.His fungemia cleared prior to antifungal therapy.  He appears clinically at his baseline, which has been a low level of functioning,which is why palliative approach has been advised by his oncologist.  He may have a pneumonia and based on CT one can justify use of antibiotics.  He looks ill, ? pneumonia, ? fluid overload,? progression of cancer  Suggest.  1. 1 more day of fluconazole  2.MRSA nasal screen negative, vanco stopped  3 continue meropenem although he looks ill and he may not have an antibiotic responsive  process  4.consider lasix  5.Wife updated at bedside, she is aware of poor prognosis, gaols of care may need to be discussed

## 2018-10-10 NOTE — PROGRESS NOTE ADULT - SUBJECTIVE AND OBJECTIVE BOX
CC: f/u for possible pneumonia and fungemia    Patient reports: his wife is at bedside, he is intermittently confused, appears somewhat dyspneic.No abdominal pain, voiding on his own.    REVIEW OF SYSTEMS:  All other review of systems negative (Comprehensive ROS)    Antimicrobials Day #  day 13 /14 fluconazole, day 3 meropenem  fluconAZOLE IVPB 400 milliGRAM(s) IV Intermittent every 24 hours  meropenem  IVPB 2000 milliGRAM(s) IV Intermittent every 8 hours    Other Medications Reviewed    T(F): 98.6 (10-10-18 @ 06:00), Max: 99.5 (10-09-18 @ 22:18)  HR: 102 (10-10-18 @ 06:00)  BP: 169/73 (10-10-18 @ 06:00)  RR: 18 (10-10-18 @ 06:00)  SpO2: 93% (10-10-18 @ 06:00)  Wt(kg): --    PHYSICAL EXAM:  General: alert, slight tachypnea  Eyes:  anicteric, no conjunctival injection, no discharge  Oropharynx: no lesions or injection 	  Neck: supple, without adenopathy  Lungs: scattered wheezes and rales  Heart: irregular rate and rhythm; no murmur, rubs or gallops  Abdomen: soft, nondistended, nontender, without mass or organomegaly  Skin: no lesions  Extremities: no clubbing, cyanosis, +edema  Neurologic: alert,follows simple commands    LAB RESULTS:              MICROBIOLOGY:  RECENT CULTURES:  10-07 @ 22:59 BLOOD VENOUS   Negative at 24 hours      NO ORGANISMS ISOLATED  NO ORGANISMS ISOLATED AT 48 HRS.  10-07 @ 22:21 URINE MIDSTREAM         10-07 @ 21:11 BLOOD PERIPHERAL         NO ORGANISMS ISOLATED  NO ORGANISMS ISOLATED AT 48 HRS.      RADIOLOGY REVIEWED:    < from: CT Chest No Cont (10.08.18 @ 09:06) >  IMPRESSION:    When compared with September 26, 2018:    New multifocal consolidations throughout both lungs which may be due to   infection or edema. Follow-up to ensure resolution.

## 2018-10-11 DIAGNOSIS — Z71.89 OTHER SPECIFIED COUNSELING: ICD-10-CM

## 2018-10-11 DIAGNOSIS — C67.9 MALIGNANT NEOPLASM OF BLADDER, UNSPECIFIED: ICD-10-CM

## 2018-10-11 DIAGNOSIS — R53.2 FUNCTIONAL QUADRIPLEGIA: ICD-10-CM

## 2018-10-11 DIAGNOSIS — J96.00 ACUTE RESPIRATORY FAILURE, UNSPECIFIED WHETHER WITH HYPOXIA OR HYPERCAPNIA: ICD-10-CM

## 2018-10-11 LAB
ALBUMIN SERPL ELPH-MCNC: 2.5 G/DL — LOW (ref 3.3–5)
ALP SERPL-CCNC: 156 U/L — HIGH (ref 40–120)
ALT FLD-CCNC: 33 U/L — SIGNIFICANT CHANGE UP (ref 4–41)
ANISOCYTOSIS BLD QL: SLIGHT — SIGNIFICANT CHANGE UP
AST SERPL-CCNC: 75 U/L — HIGH (ref 4–40)
BASE EXCESS BLDA CALC-SCNC: 3.1 MMOL/L — SIGNIFICANT CHANGE UP
BASOPHILS # BLD AUTO: 0.09 K/UL — SIGNIFICANT CHANGE UP (ref 0–0.2)
BASOPHILS NFR BLD AUTO: 1.1 % — SIGNIFICANT CHANGE UP (ref 0–2)
BASOPHILS NFR SPEC: 0 % — SIGNIFICANT CHANGE UP (ref 0–2)
BILIRUB SERPL-MCNC: 1.8 MG/DL — HIGH (ref 0.2–1.2)
BLASTS # FLD: 0 % — SIGNIFICANT CHANGE UP (ref 0–0)
BUN SERPL-MCNC: 20 MG/DL — SIGNIFICANT CHANGE UP (ref 7–23)
CALCIUM SERPL-MCNC: 9.3 MG/DL — SIGNIFICANT CHANGE UP (ref 8.4–10.5)
CHLORIDE SERPL-SCNC: 100 MMOL/L — SIGNIFICANT CHANGE UP (ref 98–107)
CO2 SERPL-SCNC: 25 MMOL/L — SIGNIFICANT CHANGE UP (ref 22–31)
CREAT SERPL-MCNC: 0.7 MG/DL — SIGNIFICANT CHANGE UP (ref 0.5–1.3)
EOSINOPHIL # BLD AUTO: 0.06 K/UL — SIGNIFICANT CHANGE UP (ref 0–0.5)
EOSINOPHIL NFR BLD AUTO: 0.8 % — SIGNIFICANT CHANGE UP (ref 0–6)
EOSINOPHIL NFR FLD: 0 % — SIGNIFICANT CHANGE UP (ref 0–6)
GI PCR PANEL, STOOL: SIGNIFICANT CHANGE UP
GIANT PLATELETS BLD QL SMEAR: PRESENT — SIGNIFICANT CHANGE UP
GLUCOSE SERPL-MCNC: 143 MG/DL — HIGH (ref 70–99)
HCO3 BLDA-SCNC: 27 MMOL/L — HIGH (ref 22–26)
HCT VFR BLD CALC: 32.5 % — LOW (ref 39–50)
HGB BLD-MCNC: 10.5 G/DL — LOW (ref 13–17)
IMM GRANULOCYTES # BLD AUTO: 0.57 # — SIGNIFICANT CHANGE UP
IMM GRANULOCYTES NFR BLD AUTO: 7.3 % — HIGH (ref 0–1.5)
LYMPHOCYTES # BLD AUTO: 1.04 K/UL — SIGNIFICANT CHANGE UP (ref 1–3.3)
LYMPHOCYTES # BLD AUTO: 13.2 % — SIGNIFICANT CHANGE UP (ref 13–44)
LYMPHOCYTES NFR SPEC AUTO: 6.2 % — LOW (ref 13–44)
MACROCYTES BLD QL: SLIGHT — SIGNIFICANT CHANGE UP
MAGNESIUM SERPL-MCNC: 1.9 MG/DL — SIGNIFICANT CHANGE UP (ref 1.6–2.6)
MCHC RBC-ENTMCNC: 28.2 PG — SIGNIFICANT CHANGE UP (ref 27–34)
MCHC RBC-ENTMCNC: 32.3 % — SIGNIFICANT CHANGE UP (ref 32–36)
MCV RBC AUTO: 87.4 FL — SIGNIFICANT CHANGE UP (ref 80–100)
METAMYELOCYTES # FLD: 0 % — SIGNIFICANT CHANGE UP (ref 0–1)
MICROCYTES BLD QL: SLIGHT — SIGNIFICANT CHANGE UP
MONOCYTES # BLD AUTO: 0.93 K/UL — HIGH (ref 0–0.9)
MONOCYTES NFR BLD AUTO: 11.8 % — SIGNIFICANT CHANGE UP (ref 2–14)
MONOCYTES NFR BLD: 13.3 % — HIGH (ref 2–9)
MYELOCYTES NFR BLD: 0 % — SIGNIFICANT CHANGE UP (ref 0–0)
NEUTROPHIL AB SER-ACNC: 73.4 % — SIGNIFICANT CHANGE UP (ref 43–77)
NEUTROPHILS # BLD AUTO: 5.16 K/UL — SIGNIFICANT CHANGE UP (ref 1.8–7.4)
NEUTROPHILS NFR BLD AUTO: 65.8 % — SIGNIFICANT CHANGE UP (ref 43–77)
NEUTS BAND # BLD: 3.5 % — SIGNIFICANT CHANGE UP (ref 0–6)
NRBC # FLD: 0.05 — SIGNIFICANT CHANGE UP
OTHER - HEMATOLOGY %: 2.7 — SIGNIFICANT CHANGE UP
OVALOCYTES BLD QL SMEAR: SIGNIFICANT CHANGE UP
PCO2 BLDA: 53 MMHG — HIGH (ref 35–48)
PH BLDA: 7.34 PH — LOW (ref 7.35–7.45)
PHOSPHATE SERPL-MCNC: 1.9 MG/DL — LOW (ref 2.5–4.5)
PLATELET # BLD AUTO: 162 K/UL — SIGNIFICANT CHANGE UP (ref 150–400)
PLATELET COUNT - ESTIMATE: NORMAL — SIGNIFICANT CHANGE UP
PMV BLD: 10.7 FL — SIGNIFICANT CHANGE UP (ref 7–13)
PO2 BLDA: 111 MMHG — HIGH (ref 83–108)
POIKILOCYTOSIS BLD QL AUTO: SLIGHT — SIGNIFICANT CHANGE UP
POTASSIUM SERPL-MCNC: 3.7 MMOL/L — SIGNIFICANT CHANGE UP (ref 3.5–5.3)
POTASSIUM SERPL-SCNC: 3.7 MMOL/L — SIGNIFICANT CHANGE UP (ref 3.5–5.3)
PROMYELOCYTES # FLD: 0 % — SIGNIFICANT CHANGE UP (ref 0–0)
PROT SERPL-MCNC: 6.1 G/DL — SIGNIFICANT CHANGE UP (ref 6–8.3)
RBC # BLD: 3.72 M/UL — LOW (ref 4.2–5.8)
RBC # FLD: 14.5 % — SIGNIFICANT CHANGE UP (ref 10.3–14.5)
SAO2 % BLDA: 97.6 % — SIGNIFICANT CHANGE UP (ref 95–99)
SODIUM SERPL-SCNC: 138 MMOL/L — SIGNIFICANT CHANGE UP (ref 135–145)
SPECIMEN SOURCE: SIGNIFICANT CHANGE UP
SPECIMEN SOURCE: SIGNIFICANT CHANGE UP
VARIANT LYMPHS # BLD: 0 % — SIGNIFICANT CHANGE UP
WBC # BLD: 7.85 K/UL — SIGNIFICANT CHANGE UP (ref 3.8–10.5)
WBC # FLD AUTO: 7.85 K/UL — SIGNIFICANT CHANGE UP (ref 3.8–10.5)

## 2018-10-11 PROCEDURE — 99223 1ST HOSP IP/OBS HIGH 75: CPT | Mod: GC

## 2018-10-11 PROCEDURE — 93010 ELECTROCARDIOGRAM REPORT: CPT

## 2018-10-11 RX ORDER — LEVETIRACETAM 250 MG/1
500 TABLET, FILM COATED ORAL EVERY 12 HOURS
Qty: 0 | Refills: 0 | Status: DISCONTINUED | OUTPATIENT
Start: 2018-10-11 | End: 2018-10-12

## 2018-10-11 RX ORDER — FUROSEMIDE 40 MG
40 TABLET ORAL ONCE
Qty: 0 | Refills: 0 | Status: COMPLETED | OUTPATIENT
Start: 2018-10-11 | End: 2018-10-11

## 2018-10-11 RX ORDER — MORPHINE SULFATE 50 MG/1
1 CAPSULE, EXTENDED RELEASE ORAL
Qty: 0 | Refills: 0 | Status: DISCONTINUED | OUTPATIENT
Start: 2018-10-11 | End: 2018-10-12

## 2018-10-11 RX ORDER — POTASSIUM PHOSPHATE, MONOBASIC POTASSIUM PHOSPHATE, DIBASIC 236; 224 MG/ML; MG/ML
15 INJECTION, SOLUTION INTRAVENOUS ONCE
Qty: 0 | Refills: 0 | Status: COMPLETED | OUTPATIENT
Start: 2018-10-11 | End: 2018-10-11

## 2018-10-11 RX ADMIN — Medication 100 MILLIGRAM(S): at 06:30

## 2018-10-11 RX ADMIN — Medication 40 MILLIGRAM(S): at 09:49

## 2018-10-11 RX ADMIN — LEVETIRACETAM 500 MILLIGRAM(S): 250 TABLET, FILM COATED ORAL at 06:30

## 2018-10-11 RX ADMIN — POTASSIUM PHOSPHATE, MONOBASIC POTASSIUM PHOSPHATE, DIBASIC 62.5 MILLIMOLE(S): 236; 224 INJECTION, SOLUTION INTRAVENOUS at 10:54

## 2018-10-11 RX ADMIN — MORPHINE SULFATE 1 MILLIGRAM(S): 50 CAPSULE, EXTENDED RELEASE ORAL at 18:10

## 2018-10-11 RX ADMIN — Medication 3 MILLILITER(S): at 02:12

## 2018-10-11 RX ADMIN — HEPARIN SODIUM 5000 UNIT(S): 5000 INJECTION INTRAVENOUS; SUBCUTANEOUS at 22:24

## 2018-10-11 RX ADMIN — MORPHINE SULFATE 1 MILLIGRAM(S): 50 CAPSULE, EXTENDED RELEASE ORAL at 15:18

## 2018-10-11 RX ADMIN — HEPARIN SODIUM 5000 UNIT(S): 5000 INJECTION INTRAVENOUS; SUBCUTANEOUS at 06:30

## 2018-10-11 RX ADMIN — MORPHINE SULFATE 1 MILLIGRAM(S): 50 CAPSULE, EXTENDED RELEASE ORAL at 15:30

## 2018-10-11 RX ADMIN — FLUCONAZOLE 100 MILLIGRAM(S): 150 TABLET ORAL at 00:07

## 2018-10-11 RX ADMIN — HEPARIN SODIUM 5000 UNIT(S): 5000 INJECTION INTRAVENOUS; SUBCUTANEOUS at 15:00

## 2018-10-11 RX ADMIN — MEROPENEM 200 MILLIGRAM(S): 1 INJECTION INTRAVENOUS at 06:30

## 2018-10-11 RX ADMIN — Medication 0.12 MILLIGRAM(S): at 06:30

## 2018-10-11 RX ADMIN — MEROPENEM 200 MILLIGRAM(S): 1 INJECTION INTRAVENOUS at 15:01

## 2018-10-11 RX ADMIN — MEROPENEM 200 MILLIGRAM(S): 1 INJECTION INTRAVENOUS at 23:39

## 2018-10-11 RX ADMIN — LEVETIRACETAM 400 MILLIGRAM(S): 250 TABLET, FILM COATED ORAL at 22:24

## 2018-10-11 NOTE — CONSULT NOTE ADULT - SUBJECTIVE AND OBJECTIVE BOX
CHIEF COMPLAINT:  Confusion, hypoxemia    HPI:  89M with metastatic bladder cancer to liver, recent hospitalization for fungemia presenting from [facility] with altered mental status.   Patient totally obtunded, unable to give any history. Per family and report, patient had sudden decline the day before presentation, becoming confused and tachypneic.     PAST MEDICAL & SURGICAL HISTORY:  Cancer: prostate/bladder cancer  Pacemaker  HF (heart failure)  Seizure  Prostate cancer  BPH (benign prostatic hypertrophy)  PAF (paroxysmal atrial fibrillation)  TIA (Transient Ischemic Attack)  Prostate Ca: s/p seed implant  Hyperlipemia  HTN (Hypertension)  H/O prostate cancer: seed implant  No Past Surgical History      FAMILY HISTORY:  unable to obtain      SOCIAL HISTORY:  unable to obtain    HOME MEDICATIONS:  Home Medications:  amLODIPine 5 mg oral tablet: 1 tab(s) orally once a day (08 Oct 2018 00:58)  aspirin 81 mg oral delayed release tablet: 1 tab(s) orally once a day (08 Oct 2018 00:58)  digoxin 125 mcg (0.125 mg) oral tablet: 1 tab(s) orally once a day (08 Oct 2018 00:58)  enoxaparin: 40 milligram(s) subcutaneous once a day (08 Oct 2018 00:58)  fluconazole 200 mg oral tablet: 2 tab(s) orally once a day until 10/10/18 (08 Oct 2018 00:58)  levETIRAcetam 500 mg oral tablet: 1 tab(s) orally 2 times a day (08 Oct 2018 00:58)  metoprolol succinate 100 mg oral tablet, extended release: 1 tab(s) orally once a day (09 Oct 2018 11:58)  metoprolol succinate 100 mg oral tablet, extended release: 1 tab(s) orally 2 times a day (08 Oct 2018 00:58)  simvastatin 20 mg oral tablet: 1 tab(s) orally once a day (at bedtime) (08 Oct 2018 00:58)  tamsulosin 0.4 mg oral capsule: 1 cap(s) orally once a day (at bedtime) (08 Oct 2018 00:58)      REVIEW OF SYSTEMS:  Constitutional: [ ] negative [ ] fevers [ ] chills [ ] weight loss [ ] weight gain  HEENT: [ ] negative [ ] dry eyes [ ] eye irritation [ ] postnasal drip [ ] nasal congestion  CV: [ ] negative  [ ] chest pain [ ] orthopnea [ ] palpitations [ ] murmur  Resp: [ ] negative [ ] cough [ ] shortness of breath [ ] dyspnea [ ] wheezing [ ] sputum [ ] hemoptysis  GI: [ ] negative [ ] nausea [ ] vomiting [ ] diarrhea [ ] constipation [ ] abd pain [ ] dysphagia   : [ ] negative [ ] dysuria [ ] nocturia [ ] hematuria [ ] increased urinary frequency  Musculoskeletal: [ ] negative [ ] back pain [ ] myalgias [ ] arthralgias [ ] fracture  Skin: [ ] negative [ ] rash [ ] itch  Neurological: [ ] negative [ ] headache [ ] dizziness [ ] syncope [ ] weakness [ ] numbness  Psychiatric: [ ] negative [ ] anxiety [ ] depression  Endocrine: [ ] negative [ ] diabetes [ ] thyroid problem  Hematologic/Lymphatic: [ ] negative [ ] anemia [ ] bleeding problem  Allergic/Immunologic: [ ] negative [ ] itchy eyes [ ] nasal discharge [ ] hives [ ] angioedema  [ ] All other systems negative  [X ] Unable to assess ROS because encephalopathy    OBJECTIVE:  ICU Vital Signs Last 24 Hrs  T(C): 36.5 (11 Oct 2018 13:27), Max: 36.9 (11 Oct 2018 06:15)  T(F): 97.7 (11 Oct 2018 13:27), Max: 98.5 (11 Oct 2018 06:15)  HR: 102 (11 Oct 2018 14:31) (96 - 128)  BP: 135/60 (11 Oct 2018 13:27) (135/60 - 155/66)  RR: 20 (11 Oct 2018 13:27) (18 - 46)  SpO2: 84% (11 Oct 2018 14:31) (84% - 97%)        PHYSICAL EXAM:  General: ill appearing man on bipap, \  Opens eyes to stimulus, does not verbalize or follow commands.  Respiratory: Using accessory muscles of respiration on bipap  SpO2 80% on FiO2 of 80%  Diffuse rhonchi in all fields  \  Cool extremities:    HOSPITAL MEDICATIONS:  Standing Meds:  aspirin enteric coated 81 milliGRAM(s) Oral daily  digoxin     Tablet 0.125 milliGRAM(s) Oral daily  fluconAZOLE IVPB 400 milliGRAM(s) IV Intermittent every 24 hours  heparin  Injectable 5000 Unit(s) SubCutaneous every 8 hours  levETIRAcetam 500 milliGRAM(s) Oral two times a day  meropenem  IVPB 2000 milliGRAM(s) IV Intermittent every 8 hours  metoprolol succinate  milliGRAM(s) Oral daily  sodium chloride 0.9%. 1000 milliLiter(s) IV Continuous <Continuous>  tamsulosin 0.4 milliGRAM(s) Oral at bedtime      PRN Meds:  morphine  - Injectable 1 milliGRAM(s) IV Push every 2 hours PRN      LABS:                        10.5   7.85  )-----------( 162      ( 11 Oct 2018 06:12 )             32.5     Hgb Trend: 10.5<--, 10.5<--, 10.5<--, 12.2<--  10-11    138  |  100  |  20  ----------------------------<  143<H>  3.7   |  25  |  0.70    Ca    9.3      11 Oct 2018 06:12  Phos  1.9     10-11  Mg     1.9     10-11    TPro  6.1  /  Alb  2.5<L>  /  TBili  1.8<H>  /  DBili  x   /  AST  75<H>  /  ALT  33  /  AlkPhos  156<H>  10-11    Creatinine Trend: 0.70<--, 0.82<--, 0.98<--, 0.91<--, 0.75<--, 0.93<--      Arterial Blood Gas:  10-11 @ 10:00  7.34/53/111/27/97.6/3.1      MICROBIOLOGY:     Culture - Stool (collected 10 Oct 2018 03:57)  Source: [facility]ES  Preliminary Report (11 Oct 2018 13:26):    ****************** PRELIMINARY **************************    NEGATIVE FOR SALMONELLA, SHIGELLA, YERSINIA,    E. COLI O157, AEROMONAS, PLESIOMONAS, AND VIBRIO SP.                        AFTER 24 HOURS    *********************************************************    GI PCR Panel, Stool (collected 10 Oct 2018 03:57)  Source: [facility]ES        RADIOLOGY:  [ x] Reviewed and interpreted by me  Xray with bilateral interstitial infiltrates

## 2018-10-11 NOTE — CONSULT NOTE ADULT - SUBJECTIVE AND OBJECTIVE BOX
HPI:  Obtained from H&P  89M with PMHx significant for metastatic bladder cancer s/p TUBPT (5/16/18) and radiation therapy, TIA, seizure disorder, AFib (s/p pacemaker), HTN, HLD, and CHFpEF, with recent discharge for candida fungemis on IV fluconazole [facility] and abdominal pain x 1 day. Patient was recently admitted to the hospital and found to have candida parapsolosis fungemia and was started on IV fluconazole for treatment. During his previous hospitalization he was also found to have progression of his bladder cancer with new metastases to the liver.   He was seen by oncology who said given his functional status, he was not a candidate for chemotherapy. Since his discharge to rehab, family and patient report no complaints. Today, patient was alert. However, began to feel nauseated with one episode of vomiting after drinking Glucerna. Per wife, patient had significant coughing after vomiting with difficulty breathing. He has been having loose BMs x 5 days, watery. Per wife, patient's heart rate and blood pressure were fluctuating HR 120s, SBP 180s. This evening, the patient became less responsive so they insisted he be brought to the hospital.    Patient currently tachypneic on bipap     PERTINENT PM/SXH:   Cancer  Pacemaker  HF (heart failure)  Seizure  Prostate cancer  BPH (benign prostatic hypertrophy)  PAF (paroxysmal atrial fibrillation)  TIA (Transient Ischemic Attack)  Prostate Ca  Hyperlipemia  HTN (Hypertension)    H/O prostate cancer  No Past Surgical History    FAMILY HISTORY:  No pertinent family history in first degree relatives      SOCIAL HISTORY:   Significant other/partner: Yes [x ]  No [ ] Children:  Yes [x ]  No [ ] Gnosticist/Spirituality: Taoist   Substance hx: Yes[ ]  No [x ]   Tobacco hx:  Yes [x ] No - former  Alcohol hx: Yes [ ] No [x ]   Home Opioid hx: No   Living Situation: [ ]Home  [ ]Long term care  [x ]Rehab [ ]Other    ADVANCE DIRECTIVES:    DNR  Yes  MOLST  [ ]  Living Will  [ ]   DECISION MAKER(s):  [ ] Health Care Proxy(s)  [ x] Surrogate(s)  [ ] Guardian           Name(s): Phone Number(s):  Nai Gilbert - [phone]    BASELINE (I)ADL(s) (prior to admission):  [place]: [ ]Total  [x] Moderate [ ]Dependent    Allergies    ACE inhibitors (Other)    Intolerances    MEDICATIONS  (STANDING):  aspirin enteric coated 81 milliGRAM(s) Oral daily  digoxin     Tablet 0.125 milliGRAM(s) Oral daily  heparin  Injectable 5000 Unit(s) SubCutaneous every 8 hours  levETIRAcetam 500 milliGRAM(s) Oral two times a day  meropenem  IVPB 2000 milliGRAM(s) IV Intermittent every 8 hours  metoprolol succinate  milliGRAM(s) Oral daily  sodium chloride 0.9%. 1000 milliLiter(s) (50 mL/Hr) IV Continuous <Continuous>  tamsulosin 0.4 milliGRAM(s) Oral at bedtime    MEDICATIONS  (PRN):  morphine  - Injectable 1 milliGRAM(s) IV Push every 2 hours PRN dyspnea    PRESENT SYMPTOMS: [ ]Unable to obtain due to poor mentation   Source if other than patient:  [ ]Family   [ ]Team   ROS limited due to dyspnea and bipap   Pain (Impact on QOL): Denies     PAIN AD Score:     [email or web address] (press ctrl +  left click to view)    Dyspnea:  Yes [x ] No [ ] - [ ]Mild [ ]Moderate [ x]Severe  Anxiety:    Yes [ ] No [ ] - [ ]Mild [ ]Moderate [ ]Severe  Fatigue:    Yes [ ] No [ ] - [ ]Mild [ ]Moderate [ ]Severe  Nausea:    Yes [ ] No [ ] - [ ]Mild [ ]Moderate [ ]Severe                         Loss of appetite: NPO            Constipation:  Yes [ ] No [ ] - [ ]Mild [ ]Moderate [ ]Severe    Other Symptoms:  [ ]All other review of systems negative     Karnofsky Performance Score/Palliative Performance Status Version 2:   20%    http://palliative.info/resource_material/[email or web address]  PHYSICAL EXAM:  Vital Signs Last 24 Hrs  T(C): 36.5 (11 Oct 2018 13:27), Max: 36.9 (11 Oct 2018 06:15)  T(F): 97.7 (11 Oct 2018 13:27), Max: 98.5 (11 Oct 2018 06:15)  HR: 102 (11 Oct 2018 14:31) (96 - 128)  BP: 135/60 (11 Oct 2018 13:27) (135/60 - 155/66)  BP(mean): --  RR: 20 (11 Oct 2018 13:27) (18 - 46)  SpO2: 84% (11 Oct 2018 14:31) (84% - 97%) I&O's Summary    GENERAL:  Lethargic but easily arousable - shakes head yes/no to simple questions   HEENT:  On Bipap   PULMONARY:   Coarse - bilaterally   CARDIOVASCULAR:    [x ]Regular [ ]Irregular [x ]Tachy  [ ]Armen [ ]Murmur [ ]Other  GASTROINTESTINAL:  [ ]Soft  [x ]Distended   [x ]+BS  [ x]Non tender [ ]Tender  [ ]PEG [ ]OGT/ NGT  Last BM: 10/11/18  GENITOURINARY:  [ ]Normal [x ] Incontinent   [ ]Oliguria/Anuria   [ ][place]  MUSCULOSKELETAL:   [ ]Normal   [x ]Weakness  [ ]Bed/Wheelchair bound [ ]Edema  NEUROLOGIC:   [ ]No focal deficits  [x ] Cognitive impairment  [ ] Dysphagia [ ]Dysarthria [ ] Paresis [ ]Other   SKIN:   [x ]Normal   [ ]Pressure ulcer(s)  [ ]Rash    LABS:                        10.5   7.85  )-----------( 162      ( 11 Oct 2018 06:12 )             32.5   10-11    138  |  100  |  20  ----------------------------<  143<H>  3.7   |  25  |  0.70    Ca    9.3      11 Oct 2018 06:12  Phos  1.9     10-11  Mg     1.9     10-11    TPro  6.1  /  Alb  2.5<L>  /  TBili  1.8<H>  /  DBili  x   /  AST  75<H>  /  ALT  33  /  AlkPhos  156<H>  10-11        RADIOLOGY & ADDITIONAL STUDIES:    PROTEIN CALORIE MALNUTRITION PRESENT: [ ] Yes [ ] No  [ ] PPSV2 < or = to 30% [ ] significant weight loss  [ ] poor nutritional intake [ ] catabolic state [ ] anasarca     Albumin, Serum: 2.5 g/dL (10-11-18 @ 06:12)      REFERRALS:   [ ]Chaplaincy  [ ] Hospice  [ ]Child Life  [ ]Social Work  [ ]Case management [ ]Holistic Therapy   Goals of Care Discussion Document:

## 2018-10-11 NOTE — PROGRESS NOTE ADULT - ASSESSMENT
_________________________________________________________________________________________  ========>>  M E D I C A L   A T T E N D I N G    F O L L O W  U P  N O T E  <<=========  -----------------------------------------------------------------------------------------------------    - Patient seen and examined by me earlier today.   - In summary,  JESSICA DOWD is a 89y year old man who originally presented with ams, cough, weakness  - Patient today took a turn for the worse with worsening respiratory status, placed on Bipap, post IV lasix, for pulm edema on Xray, remains tachypneic on Bipap.        Palliative and pulm consults hae been requested per family request. pt is DNR status now and family understands overall poor prognosis..     ==================>> REVIEW OF SYSTEM <<=================    (limited ROS)  pt partly responsive to stimuli, on Bipap, tachypneic     ==================>> PHYSICAL EXAM <<=================    GEN: A&O X 2 , NAD , comfortable, weak appearing , weak speech   HEENT: NCAT, PERRL, MMM, hearing intact  Neck: supple , no JVD  CVS: S1S2 , regular , No M/R/G appreciated 9limited as pt not taking deep breaths)   PULM: + b/l crackles   ABD.: soft. non tender, non distended,  bowel sounds present  Extrem: intact pulses ,+  trace edema   PSYCH : normal mood,  not anxious      ==================>> MEDICATIONS <<====================    aspirin enteric coated 81 milliGRAM(s) Oral daily  digoxin     Tablet 0.125 milliGRAM(s) Oral daily  fluconAZOLE IVPB 400 milliGRAM(s) IV Intermittent every 24 hours  heparin  Injectable 5000 Unit(s) SubCutaneous every 8 hours  levETIRAcetam 500 milliGRAM(s) Oral two times a day  meropenem  IVPB 2000 milliGRAM(s) IV Intermittent every 8 hours  metoprolol succinate  milliGRAM(s) Oral daily  sodium chloride 0.9%. 1000 milliLiter(s) IV Continuous <Continuous>  tamsulosin 0.4 milliGRAM(s) Oral at bedtime    ==================>> VITAL SIGNS <<==================    Vital Signs Last 24 Hrs  T(C): 37.1 (10-10-18 @ 13:44)  T(F): 98.7 (10-10-18 @ 13:44), Max: 99.5 (10-09-18 @ 22:18)  HR: 103 (10-10-18 @ 13:44) (85 - 103)  BP: 154/67 (10-10-18 @ 13:44)  RR: 18 (10-10-18 @ 13:44) (18 - 18)  SpO2: 91% (10-10-18 @ 13:44) (91% - 96%)       ==================>> LAB AND IMAGING <<==================                        10.5   8.69  )-----------( 182      ( 10 Oct 2018 14:10 )             32.1        137  |  98  |  22  ----------------------------<  159<H>  3.8   |  24  |  0.82    Ca    9.5      10 Oct 2018 14:10  Phos  2.0     10-10  Mg     1.8     10-10    TPro  6.6  /  Alb  2.9<L>  /  TBili  2.0<H>  /  DBili  x   /  AST  89<H>  /  ALT  40  /  AlkPhos  174<H>  10-10    < from: CT Chest No Cont (10.08.18 @ 09:06) >  IMPRESSION:  When compared with September 26, 2018:  New multifocal consolidations throughout both lungs which may be due to   infection or edema. Follow-up to ensure resolution.  < end of copied text >    _______________________  C U L T U R E S :    Culture - Blood (collected 07 Oct 2018 22:59)  Source: BLOOD VENOUS  Preliminary Report (09 Oct 2018 22:58):    NO ORGANISMS ISOLATED    NO ORGANISMS ISOLATED AT 48 HRS.    Culture - Urine (collected 07 Oct 2018 22:21)  Source: URINE MIDSTREAM  Final Report (09 Oct 2018 08:23):    NO GROWTH AT 24 HOURS    Culture - Blood (collected 07 Oct 2018 21:11)  Source: BLOOD PERIPHERAL  Preliminary Report (09 Oct 2018 21:11):    NO ORGANISMS ISOLATED    NO ORGANISMS ISOLATED AT 48 HRS.    ___________________________________________________________________________________  ===============>>  A S S E S S M E N T   A N D   P L A N <<===============  ------------------------------------------------------------------------------------------      · Assessment		  89M with PMHx significant for metastatic bladder cancer s/p TUBPT (5/16/18) and radiation therapy, TIA, seizure disorder, AFib (s/p pacemaker), HTN, HLD, and CHFpEF, with recent discharge for candida fungemis on IV fluconazole [facility], cough and abdominal pain x 1 day, admitted for sepsis of unclear etiology     Problem/Plan - 1:  ·  Problem: Sepsis, resolving/ improved   pt with likely HCAP based on CT results as above   continue Meropenem and Fluconazole ( for fungemia)   ID consult and mgmt appreciated  supportive care     Problem/Plan - 2:  ·  Problem: Diarrhea, seems to have improved     CT A/P not showing any abnormalities  CDIFF negative  GI eval if worsened  encourage Po intake as able  S/S eval  ( I had a long discussion with pt's son about goals of care, pt's overall weakened sate and possible failure of swallow eval and pt not being a PEG candidate all questions answered son to discuss with pt's wife as well     Problem/Plan - 3:  ·  Problem: Transaminitis, improved   abnormal appearance on CT scan: likely representing known metastasis from bladder cancer   Will continue to trend  Hold simvastatin for now.   I discussed with Hem/onc ( Dr Turner), in agreement with pt's overall poor prognosis   brought up paliative/comfort measures pt's family to discuss     Problem/Plan - 4:  ·  Problem: h/o recent fungemia  Will continue fluconazole IV and finish as planned per ID    Problem/Plan - 5:  Problem: Acute on chronic combined systolic and diastolic heart failure. in pt with h/o CHFpEF, severe diastolic dysfunction with severe pulm HTN  Patient currently breathing comfortably / better  Continue Current medications and monitor.     Problem/Plan - 6:  ·  Problem: PAF, not on AC ue hx of hematuria sec to bladder ca s/p recent radiation therapy  resume BB as BP allows     Problem/Plan - 7:  ·  Problem: h/o hypertension  Held all anti-HTNS in the setting of sepsis  monitor VS. and resume meds as above as able     Problem/Plan - 9:  Problem: Seizure.   Continue Keppra 500 BID home medication  monitor     -GI/DVT Prophylaxis.    --------------------------------------------  Case discussed with pt, wife, N  Education given on findings and plan of care  ___________________________  H. EMELY Xie.  Pager: [phone] _________________________________________________________________________________________  ========>>  M E D I C A L   A T T E N D I N G    F O L L O W  U P  N O T E  <<=========  -----------------------------------------------------------------------------------------------------    - Patient seen and examined by me earlier today.   - In summary,  JESSICA DOWD is a 89y year old man who originally presented with ams, cough, weakness  - Patient today took a turn for the worse with worsening respiratory status, placed on Bipap, post IV lasix, for pulm edema on Xray, remains tachypneic on Bipap.        Palliative and pulm consults hae been requested per family request. pt is DNR status now and family understands overall poor prognosis..     ==================>> REVIEW OF SYSTEM <<=================    (limited ROS)  pt partly responsive to stimuli, on Bipap, tachypneic     ==================>> PHYSICAL EXAM <<=================    GEN: responsive, NAD, weak, on Bipap   HEENT: NCAT, PERRL,  hearing intact  Neck: supple , no JVD  CVS: S1S2 , regular , No M/R/G appreciated 9limited as pt not taking deep breaths)   PULM: + b/l rhonchi   ABD.: soft. non tender, non distended,  bowel sounds present  Extrem: intact pulses ,+  trace edema, + miranda   PSYCH : normal mood,  not anxious        ==================>> MEDICATIONS <<====================    aspirin enteric coated 81 milliGRAM(s) Oral daily  digoxin     Tablet 0.125 milliGRAM(s) Oral daily  fluconAZOLE IVPB 400 milliGRAM(s) IV Intermittent every 24 hours  heparin  Injectable 5000 Unit(s) SubCutaneous every 8 hours  levETIRAcetam 500 milliGRAM(s) Oral two times a day  meropenem  IVPB 2000 milliGRAM(s) IV Intermittent every 8 hours  metoprolol succinate  milliGRAM(s) Oral daily  sodium chloride 0.9%. 1000 milliLiter(s) IV Continuous <Continuous>  tamsulosin 0.4 milliGRAM(s) Oral at bedtime    MEDICATIONS  (PRN):  morphine  - Injectable 1 milliGRAM(s) IV Push every 2 hours PRN dyspnea    ==================>> VITAL SIGNS <<==================    Vital Signs Last 24 Hrs  T(C): 36.5 (10-11-18 @ 13:27)  T(F): 97.7 (10-11-18 @ 13:27), Max: 98.5 (10-11-18 @ 06:15)  HR: 102 (10-11-18 @ 14:31) (96 - 128)  BP: 135/60 (10-11-18 @ 13:27)  RR: 20 (10-11-18 @ 13:27) (18 - 46)  SpO2: 84% (10-11-18 @ 14:31) (84% - 97%)       ==================>> LAB AND IMAGING <<==================                        10.5   7.85  )-----------( 162      ( 11 Oct 2018 06:12 )             32.5        138  |  100  |  20  ----------------------------<  143<H>  3.7   |  25  |  0.70    Ca    9.3      11 Oct 2018 06:12  Phos  1.9     10-11  Mg     1.9     10-11    TPro  6.1  /  Alb  2.5<L>  /  TBili  1.8<H>  /  DBili  x   /  AST  75<H>  /  ALT  33  /  AlkPhos  156<H>  10-11    ___________________________________________________________________________________  ===============>>  A S S E S S M E N T   A N D   P L A N <<===============  ------------------------------------------------------------------------------------------      · Assessment		  89M with PMHx significant for metastatic bladder cancer s/p TUBPT (5/16/18) and radiation therapy, TIA, seizure disorder, AFib (s/p pacemaker), HTN, HLD, and CHFpEF, with recent discharge for candida fungemis on IV fluconazole [facility], cough and abdominal pain x 1 day, admitted for sepsis of unclear etiology     Problem/Plan - 1:  ·  Problem: Sepsis, HCAP, with acute hypoxemic respiratory failure    continue Meropenem and Fluconazole ( for fungemia)   pulm and palliative following  pt on Bipap  nebs  pt on Morphine PRN for comfort / decrease work of breathing..   supportive / comfort measures  ID consult and mgmt appreciated  supportive care     Problem/Plan - 2:  ·  Problem: Diarrhea,  improved     CT A/P not showing any abnormalities  CDIFF negative  S/S eval  on hold     Problem/Plan - 3:  ·  Problem: Transaminitis, likely due to metastasis from bladder cancer   monitor  as above    Problem/Plan - 4:  ·  Problem: h/o recent fungemia  Will continue fluconazole IV and finish as planned per ID    Problem/Plan - 5:  Problem: Acute on chronic combined systolic and diastolic heart failure. in pt with h/o CHFpEF, severe diastolic dysfunction with severe pulm HTN  LASIX As needed ( RECEIVED THIS MORNING)   Continue Current medications and monitor.     Problem/Plan - 6:  ·  Problem: PAF, not on AC ue hx of hematuria sec to bladder ca s/p recent radiation therapy  resume BB as BP allows     Problem/Plan - 7:  ·  Problem: h/o hypertension  Held all anti-HTNS in the setting of sepsis  monitor VS. and resume meds as above as able     Problem/Plan - 9:  Problem: Seizure.   Continue Keppra 500 BID home medication >> change to IV if not able to come off bipap   monitor     -GI/DVT Prophylaxis.  - overall prognosis poor, pt's family is aware   --------------------------------------------  Case discussed with pt, wife, NP..   Education given on findings and plan of care  ___________________________  H. EMELY Xie.  Pager: [phone]

## 2018-10-11 NOTE — CONSULT NOTE ADULT - PROBLEM SELECTOR RECOMMENDATION 4
Extensive conversation with patient's wife, 3 sons, daughter, and granddaughter regarding current medical condition and overall poor prognosis.  Patient's family understands that patient is not a candidate for disease modifying treatment and state that he has been declining over the past few weeks.  Family in agreement that aggressive resuscitation at the end of life would prolong his suffering given his metastatic cancer and poor prognosis.  Family would like to treat reversible causes and continue current medical management with a focus on quality of life and symptom management.  Psychosocial support provided.  Primary team aware.  ~60mins spent with patient and family.

## 2018-10-11 NOTE — CONSULT NOTE ADULT - ATTENDING COMMENTS
Prognosis poor. agree with above.
Pt seen with NP.  Agree with above.  ARF with met bladder ca- goals are to continue all aggressive medical intervention but no intubation or cpr.  Lengthy discussion as above with wife and extended family.  Emotional and spiritual support provided by family

## 2018-10-11 NOTE — CONSULT NOTE ADULT - PROBLEM SELECTOR RECOMMENDATION 2
Patient currently on bipap - tachypneic - complains of dyspnea.  Morphine 1mg IV q2h PRN.  Bipap as tolerated.  Meropenem in place.

## 2018-10-11 NOTE — CHART NOTE - NSCHARTNOTEFT_GEN_A_CORE
Notified by Rn that pt's O2 sat in low 90s and tachypneic. Evaluated pt at bedside, pt is responsive to verbal/tactile stimuli but appears more lethargic, with labored breathing. Lungs sounds coarse b/l. +tachycardic. Discussed case with medical attg, recommended initiating BIPAP. ABG drawn and sent, awaiting results. EKG also ordered.  CXR ordered yesterday for dyspnea/hypoxia, reviewed by Dr. Xie and 40mg IV lasix ordered x 1 dose. Updated son at bedside with current plan of care. Will continue to monitor closely.

## 2018-10-11 NOTE — CONSULT NOTE ADULT - ASSESSMENT
Assessment  Acute respiratory failure likely due to sepsis now with acute respiratory distress syndrome failing non-invasive ventilation.   Family is clear in their wishes that the patient not be intubated or receive CPR given his advanced and untreatable cancer.     Patient does have heart failure history, however this is being treated with additional diuretics and NIPPV, however he has continued to decline.     Will likely die in next hours to days given degree of respiratory distress and hypoxia at this time.       Recs  Given overall trajectory and goals of care, would treat only symptoms at this time: ie remove bipap for family members' comfort and give morphine for respiratory distress.       Rogers Walter MD  Pulmonary/Critical Care Fellow  page: [phone]

## 2018-10-11 NOTE — CONSULT NOTE ADULT - ASSESSMENT
89M with PMHx significant for metastatic bladder cancer s/p TUBPT (5/16/18) and radiation therapy currently with acute respiratory failure.  Palliative consulted for goals of care.

## 2018-10-12 VITALS — HEART RATE: 111 BPM | OXYGEN SATURATION: 95 %

## 2018-10-12 LAB
BACTERIA BLD CULT: SIGNIFICANT CHANGE UP
BACTERIA BLD CULT: SIGNIFICANT CHANGE UP
BACTERIA STL CULT: SIGNIFICANT CHANGE UP

## 2018-10-12 RX ADMIN — MORPHINE SULFATE 1 MILLIGRAM(S): 50 CAPSULE, EXTENDED RELEASE ORAL at 00:35

## 2018-10-12 RX ADMIN — MORPHINE SULFATE 1 MILLIGRAM(S): 50 CAPSULE, EXTENDED RELEASE ORAL at 00:31

## 2018-10-12 RX ADMIN — MEROPENEM 200 MILLIGRAM(S): 1 INJECTION INTRAVENOUS at 06:54

## 2018-10-12 RX ADMIN — LEVETIRACETAM 400 MILLIGRAM(S): 250 TABLET, FILM COATED ORAL at 10:49

## 2018-10-12 RX ADMIN — HEPARIN SODIUM 5000 UNIT(S): 5000 INJECTION INTRAVENOUS; SUBCUTANEOUS at 06:54

## 2018-10-12 NOTE — DISCHARGE NOTE FOR THE EXPIRED PATIENT - HOSPITAL COURSE
89M with PMHx significant for metastatic bladder cancer s/p TUBPT (5/16/18) and radiation therapy, TIA, seizure disorder, AFib (s/p pacemaker), HTN, HLD, and CHFpEF, with recent discharge for candida fungemis on IV fluconazole [facility], cough and abdominal pain x 1 day, admitted for sepsis of unclear etiology   Sepsis, HCAP, with acute hypoxemic respiratory failure  continue Meropenem and Fluconazole ( for fungemia) pulm and palliative following  pt on Bipap/ nebspt on Morphine PRN for comfort / decrease work of breathing..   supportive / comfort measures  Diarrhea,  improved   CT A/P not showing any abnormalitiesCDIFF negative  S/S eval  on hold    Transaminitis, likely due to metastasis from bladder cancer monitor     Acute on chronic combined systolic and diastolic heart failure. in pt with h/o CHFpEF, severe diastolic dysfunction with severe pulm HTN;LASIX As needed ( RECEIVED THIS MORNING)   Continue Current medications and monitor.    PAF, not on AC ue hx of hematuria sec to bladder ca s/p recent radiation therapy resume BB as BP allows    h/o hypertension Held all anti-HTNS in the setting of sepsis    Seizure. Continue Keppra 500 BID home medication >> change to IV if not able to come off bipap monitor     10/12 : Patient unresponsive to verbal/noxious stimuli.  Pupils fixed and dilated. No spontaneous breathing. No palpable carotid/radial pulse. No heart sounds. No breath sounds. Pronounced death at 11am at 10/12/18. Wife and children at bedside declined autopsy.  called to beside by Palliative NP. Dr. Xie notified.

## 2018-10-12 NOTE — GOALS OF CARE CONVERSATION - PERSONAL ADVANCE DIRECTIVE - CONVERSATION DETAILS
Meeting held with pt's spouse and adult children to discuss pt's overall medical condition including trajectory of disease, treatment plan, prognosis and advanced directives. Pt's family appeared to be understanding that pt's diseases has progressed and pt's overall prognosis is poor. He was seen by oncology who said given his functional status, he was not a candidate for chemotherapy.   Extensive conversation with patient's wife, 3 sons, daughter, and granddaughter regarding current medical condition and overall poor prognosis.  Patient's family understands that patient is not a candidate for disease modifying treatment and state that he has been declining over the past few weeks.  Family in agreement that aggressive resuscitation at the end of life would prolong his suffering given his metastatic cancer and poor prognosis.  Family would like to treat reversible causes and continue current medical management with a focus on quality of life and symptom management. Family want pt to remain on bi-pap however understand pt's prognosis is very poor.   Chaplaincy support offered. Wife states their  has visited however is receptive to further spiritual support.  requested to see pt and family today. Support group information provided to spouse and family. Family supports seem intact and family are all realistic.   Emotional support provided to family regarding anticipatory grief and bereavement.

## 2018-10-12 NOTE — DISCHARGE NOTE FOR THE EXPIRED PATIENT - NS PATIENT DEATH CRITERIA
Patient is dead based on Cardiopulmonary criteria including absent breath sounds, pulselessness and/or asystole/including absent breath sounds, pulslessness and asystole

## 2018-10-12 NOTE — CHART NOTE - NSCHARTNOTEFT_GEN_A_CORE
Patient unresponsive to verbal/noxious stimuli.  Pupils fixed and dilated. No spontaneous breathing. No palpable carotid/radial pulse. No heart sounds. No breath sounds. Pronounced death at 11am at 10/12/18. Wife and children at bedside declined autopsy.  called to beside by Palliative NP. Dr. Xie notified

## 2018-11-11 PROCEDURE — 85014 HEMATOCRIT: CPT

## 2018-11-11 PROCEDURE — 97116 GAIT TRAINING THERAPY: CPT

## 2018-11-11 PROCEDURE — 82607 VITAMIN B-12: CPT

## 2018-11-11 PROCEDURE — 96360 HYDRATION IV INFUSION INIT: CPT | Mod: XU

## 2018-11-11 PROCEDURE — 80053 COMPREHEN METABOLIC PANEL: CPT

## 2018-11-11 PROCEDURE — 83605 ASSAY OF LACTIC ACID: CPT

## 2018-11-11 PROCEDURE — 84295 ASSAY OF SERUM SODIUM: CPT

## 2018-11-11 PROCEDURE — 87046 STOOL CULTR AEROBIC BACT EA: CPT

## 2018-11-11 PROCEDURE — 84443 ASSAY THYROID STIM HORMONE: CPT

## 2018-11-11 PROCEDURE — 87324 CLOSTRIDIUM AG IA: CPT

## 2018-11-11 PROCEDURE — 87177 OVA AND PARASITES SMEARS: CPT

## 2018-11-11 PROCEDURE — 71250 CT THORAX DX C-: CPT

## 2018-11-11 PROCEDURE — 97162 PT EVAL MOD COMPLEX 30 MIN: CPT

## 2018-11-11 PROCEDURE — 81001 URINALYSIS AUTO W/SCOPE: CPT

## 2018-11-11 PROCEDURE — 87633 RESP VIRUS 12-25 TARGETS: CPT

## 2018-11-11 PROCEDURE — 87449 NOS EACH ORGANISM AG IA: CPT

## 2018-11-11 PROCEDURE — 76770 US EXAM ABDO BACK WALL COMP: CPT

## 2018-11-11 PROCEDURE — 74177 CT ABD & PELVIS W/CONTRAST: CPT

## 2018-11-11 PROCEDURE — 99285 EMERGENCY DEPT VISIT HI MDM: CPT | Mod: 25

## 2018-11-11 PROCEDURE — 82435 ASSAY OF BLOOD CHLORIDE: CPT

## 2018-11-11 PROCEDURE — 71045 X-RAY EXAM CHEST 1 VIEW: CPT

## 2018-11-11 PROCEDURE — 70450 CT HEAD/BRAIN W/O DYE: CPT

## 2018-11-11 PROCEDURE — 93005 ELECTROCARDIOGRAM TRACING: CPT | Mod: XU

## 2018-11-11 PROCEDURE — 87045 FECES CULTURE AEROBIC BACT: CPT

## 2018-11-11 PROCEDURE — 85610 PROTHROMBIN TIME: CPT

## 2018-11-11 PROCEDURE — 87581 M.PNEUMON DNA AMP PROBE: CPT

## 2018-11-11 PROCEDURE — 87181 SC STD AGAR DILUTION PER AGT: CPT

## 2018-11-11 PROCEDURE — 51701 INSERT BLADDER CATHETER: CPT

## 2018-11-11 PROCEDURE — 82947 ASSAY GLUCOSE BLOOD QUANT: CPT

## 2018-11-11 PROCEDURE — 76700 US EXAM ABDOM COMPLETE: CPT

## 2018-11-11 PROCEDURE — 97530 THERAPEUTIC ACTIVITIES: CPT

## 2018-11-11 PROCEDURE — 87507 IADNA-DNA/RNA PROBE TQ 12-25: CPT

## 2018-11-11 PROCEDURE — 87040 BLOOD CULTURE FOR BACTERIA: CPT

## 2018-11-11 PROCEDURE — 85027 COMPLETE CBC AUTOMATED: CPT

## 2018-11-11 PROCEDURE — 87150 DNA/RNA AMPLIFIED PROBE: CPT

## 2018-11-11 PROCEDURE — 80048 BASIC METABOLIC PNL TOTAL CA: CPT

## 2018-11-11 PROCEDURE — 82803 BLOOD GASES ANY COMBINATION: CPT

## 2018-11-11 PROCEDURE — 87486 CHLMYD PNEUM DNA AMP PROBE: CPT

## 2018-11-11 PROCEDURE — 84132 ASSAY OF SERUM POTASSIUM: CPT

## 2018-11-11 PROCEDURE — 95819 EEG AWAKE AND ASLEEP: CPT

## 2018-11-11 PROCEDURE — 87798 DETECT AGENT NOS DNA AMP: CPT

## 2018-11-11 PROCEDURE — 82962 GLUCOSE BLOOD TEST: CPT

## 2018-11-11 PROCEDURE — 82330 ASSAY OF CALCIUM: CPT

## 2018-11-11 PROCEDURE — 93306 TTE W/DOPPLER COMPLETE: CPT

## 2018-11-11 PROCEDURE — 87086 URINE CULTURE/COLONY COUNT: CPT

## 2018-11-11 PROCEDURE — 85730 THROMBOPLASTIN TIME PARTIAL: CPT

## 2020-01-22 NOTE — PATIENT PROFILE ADULT. - HEALTHCARE INFORMATION NEEDED, PROFILE
HOSPITALIST DAILY PROGRESS NOTE:    Date: 1/22/2020    Attending: Paula Cook MD     Problem List:   Principal Problem:    Hyponatremia  Active Problems:    Coronary atherosclerosis of unspecified type of vessel, native or graft    Essential hypertension, benign    Anemia    COPD (chronic obstructive pulmonary disease) (CMS/HCC)      Subjective: Lars Diego is a 65 year old male who was admitted on 1/20/2020 for weakness in the context of hyponatremia.  Today: continues to improve, weakness improved, no CP or SOB    ALLERGIES, MEDICATIONS REVIEWED    OBJECTIVE:   Vital 24 Hour Range Most Recent Value   Temperature Temp  Min: 96.1 °F (35.6 °C)  Max: 97.8 °F (36.6 °C) 97.7 °F (36.5 °C)   Pulse Pulse  Min: 81  Max: 109 81   Respiratory Resp  Min: 16  Max: 16 16   Blood Pressure BP  Min: 100/59  Max: 122/77 100/59   Pulse Oximetry SpO2  Min: 95 %  Max: 100 %    O2 No data recorded      Vital Most Recent Value First Value   Weight 76.9 kg Weight: 76.9 kg   Height 5' 10\" (177.8 cm) Height: 5' 10\" (177.8 cm)     I/O last 3 completed shifts:  In: 3904.5 [P.O.:1880; I.V.:2024.5]  Out: 5625 [Urine:5625]     Scheduled Medications:   • Urea  15 g Oral Daily   • aspirin  81 mg Oral Daily   • folic acid  1 mg Oral Daily   • lisinopril  2.5 mg Oral Daily   • melatonin  6 mg Oral Nightly   • metoPROLOL tartrate  25 mg Oral BID   • pantoprazole  40 mg Oral BID Abreakfast & HS   • pravastatin  40 mg Oral Q Evening   • senna  1 tablet Oral QHS   • thiamine  50 mg Oral Daily   • sodium chloride (PF)  2 mL Intracatheter 2 times per day   • enoxaparin  40 mg Subcutaneous Daily          Physical Exam:  General:        A & O X 3 in NAD  HEENT:         AT,NC,PERRLA, EOMI,OMMM         Neck:             Supple;no thyromegaly; No JVD or Carotid Bruit  Lungs:            Clear to auscultation bilaterally  Heart:            Regular rate and rhythm and S1, S2 present  Abdomen:      NT/ND;  + B.S.; No guarding or rebound; No peritoneal  signs  Extremities:   No C/C/E  Neurologic:    CN 2-12 Grossly intact,no focal deficits       Laboratory Results:  Recent Labs   Lab 01/22/20  0555 01/21/20 2020 01/21/20  1055  01/20/20 2000 01/20/20  1351 01/17/20  1300 01/15/20  0801   SODIUM 129* 127* 131*   < > 123* 123* 125* 129*   POTASSIUM 4.5 4.8 4.7   < > 4.6 4.9 4.9 4.5   CHLORIDE 95* 95* 97*   < > 92* 91* 93* 95*   CO2 27 26 28   < > 25 23 26 27   BUN 27* 27* 22*   < > 24* 27* 29* 28*   CREATININE 1.11 1.18* 0.99   < > 1.01 1.19* 1.06 1.09   GLUCOSE 93 132* 99   < > 157* 123* 87 134*   ALBUMIN  --   --   --   --   --  2.7* 3.0* 3.0*   PHOS  --   --   --   --   --   --  3.6 2.9   AST  --   --   --   --   --  10 13 9   BILIRUBIN  --   --   --   --   --  0.5 0.4 0.4   TSH  --   --   --   --  0.651  --   --  0.506    < > = values in this interval not displayed.     Recent Labs   Lab 01/22/20  0555 01/21/20  0235 01/20/20  1351   WBC 4.8 5.5 8.3   HGB 9.8* 10.7* 11.1*   HCT 28.3* 30.5* 32.1*    259 290       CARDIAC  No results found  ENDO  Recent Labs   Lab 01/20/20  2000 01/15/20  0801   TSH 0.651 0.506     LIPIDS  No results found  URINE  Recent Labs   Lab 01/20/20  1943   RENATO 50   UK 19.9   UOSM 294         Imaging:  No results found.      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   - He is also on decadron and AM cortisol is low  - workup here : urine Osm 294, Urine Na 50, Urine K 19.9  - S/p Samsca 15 mg 1/20/20, with good response, Na trend 123--131  - started D5W @ 100 ml/hr to avoid overcorrection of Na ( goal 8 points first 24 hours), IVF capped 1/22/20   - Na this   - will start fluid restriction at this point as no further Samsca is planned  - start Urea powder 15 g daily 1/22/20     Metastatic SCC lung cancer   - as per oncology      Essential HTN  - cont Home meds      Chronic Anemia   - 2/2 chronic disease, counts stable      COPD  - not exacerbated   - cont PRN Duonebs    DVT  PPX: lovenox   Code: FC  Dispo: home in AM     Paula Cook MD  1/22/2020  7:27 AM                 nothing

## 2020-06-26 NOTE — ED ADULT TRIAGE NOTE - NS ED NOTE AC HIGH RISK COUNTRIES
Impression: Other secondary cataract, left eye: H26.492. OS. Condition: worsening. Vision: vision affected. Plan: Discussed diagnosis in detail with patient. Recommend Yag Laser treatment LEFT EYE ONLY for vision improvement. Discussed risks/benefits of laser TX. All questions answered. Patient elects to proceed with recommendation.  RL1 No

## 2021-02-09 NOTE — ED PROVIDER NOTE - CPE EDP CARDIAC NORM
Occupational Therapy  Facility/Department: [facility]  Daily Treatment Note  NAME: Chidi Quevedo  : 3/5/1929  MRN: [id number]    Date of Service: 2021    Discharge Recommendations:  Continue to assess pending progress       Assessment  Patient was initially noted to cough heavily when he was in the bed and after he first got into the chair but then coughing was only occasionally throughout the rest of the session     Activity Tolerance  Activity Tolerance: Patient Tolerated treatment well  Safety Devices  Safety Devices in place: Yes  Type of devices: All fall risk precautions in place         Patient Diagnosis(es): There were no encounter diagnoses. has a past medical history of Anemia, CAD (coronary artery disease), DM (diabetes mellitus) ([facility] [address] Dyslipidemia, History of tobacco abuse, HTN (hypertension), Hypothyroidism, Non-ST elevation MI (NSTEMI) ([address] and Pacemaker. has a past surgical history that includes Cataract removal and Middle ear surgery (Left, ).     Restrictions  Restrictions/Precautions  Restrictions/Precautions: Fall Risk  Subjective   General  Chart Reviewed: Yes  Patient assessed for rehabilitation services?: Yes  Response to previous treatment: Patient with no complaints from previous session  Family / Caregiver Present: No  Referring Practitioner: Dr. Taurus Hanley  Diagnosis: Impaired mobility and ADL's d/t severe progressive PVD  General Comment  Comments: Pt on 3L O2 via NC    Patient reported no pain when asked but was noted to not let his right thigh touch the bed when he got out of bed this am. Patient reported no pain at the end of the session  Orientation     Objective    ADL  Feeding: Modified independent (Patient was able to open packets and eat without assist for breakfast this am.)  Grooming: Setup(to brush his teeth at bedside)  Additional Comments: patient declined a need to toilet at this time      Patient used B UEs to fold towels and washcloths while working on his endurance. Patient was noted to be SOB at times but continued to work. Patient used green theraputty for hand strengthening but required visual cues on how to get the putty out of the container in order to use it. Patient transferred to the chair using the walker with no physical assist but demonstration on where therapist wanted patient to sit.          Plan   Plan  Times per week: 5-7x/wk  Plan weeks: 1.5  Current Treatment Recommendations: Strengthening, Functional Mobility Training, Cognitive Reorientation, Cognitive/Perceptual Training, Patient/Caregiver Education & Training, Endurance Training, Equipment Evaluation, Education, & procurement, Balance Training, Safety Education & Training, Self-Care / ADL  Plan Comment: Continue per OT plan of care    Goals  Patient Goals   Patient goals : to go home       Therapy Time   Individual Concurrent Group Co-treatment   Time In 0725         Time Out 0755         Minutes 30              ADL trainin minutes  Therapeutic activity: 10 minutes    CHETNA Bro/L    Electronically signed by GIN Bro on 2021 at 7:55 AM normal...

## 2021-02-22 NOTE — PATIENT PROFILE ADULT. - NS PRO TALK SOMEONE YN
-- DO NOT REPLY / DO NOT REPLY ALL --  -- Message is from the Soundtracker--    COVID-19 Universal Screening: NEG    Patient needs to see jennifer  This is an emergecy for an emergency appointment. She was told this by her surgeon. She s/p Bowel surgery and once seen in office by PCP the surgeon will get back with her. .please assist. [phone] madeleine otoole  1935. THANKS. no

## 2021-08-02 NOTE — ED ADULT TRIAGE NOTE - NS ED NURSE BANDS TYPE
Name band;
[FreeTextEntry1] : Ms. DIAZ will follow up  if needed. Warning signs, follow up, and restrictions were discussed with the patient.

## 2021-09-22 NOTE — ED ADULT TRIAGE NOTE - AS TEMP SITE
- Give amoxicillin 6.5 mL twice daily for 10 days  - Administer saline drops and then suction with a NoseFrida especially before feeding and before sleep  - Cool mist humidifier in the room  - Encourage fluid intake  - Monitor for decreased wet diapers  - Tylenol or ibuprofen as needed for fever / comfort care  - Can give a teaspoon of honey as needed for cough  - Can give Zarbee's as needed for cough  - Notify office if symptoms are worsening or for any other concerns    Patient Education     Viral Upper Respiratory Illness (Child)  Your child has a viral upper respiratory illness (URI). This is also called a common cold. The virus is contagious during the first few days. It is spread through the air by coughing or sneezing, or by direct contact. This means by touching your sick child then touching your own eyes, nose, or mouth. Washing your hands often will decrease risk of spreading the virus. Most viral illnesses go away within 7 to 14 days with rest and simple home remedies. But they may sometimes last up to 4 weeks. Antibiotics will not kill a virus. They are generally not prescribed for this condition.     Home care  · Fluids. Fever increases the amount of water lost from the body. Encourage your child to drink lots of fluids to loosen lung secretions and make it easier to breathe.   ? For babies under 1 year old,  continue regular formula feedings or breastfeeding. Between feedings, give oral rehydration solution. This is available from drugstores and grocery stores without a prescription.  ? For children over 1 year old, give plenty of fluids, such as water, juice, gelatin water, soda without caffeine, ginger ale, lemonade, or ice pops.  · Eating. If your child doesn't want to eat solid foods, it's OK for a few days, as long as he or she drinks lots of fluid.  · Rest. Keep children with fever at home resting or playing quietly until the fever is gone. Encourage frequent naps. Your child may return to   or school when the fever is gone and he or she is eating well, does not tire easily, and is feeling better.  · Sleep. Periods of sleeplessness and irritability are common.  ? Children 1 year and older:  Have your child sleep in a slightly upright position. This is to help make breathing easier. If possible, raise the head of the bed slightly. Or raise your older child’s head and upper body up with extra pillows. Talk with your healthcare provider about how far to raise your child's head.  ? Babies younger than 12 months: Never use pillows or put your baby to sleep on their stomach or side. Babies younger than 12 months should sleep on a flat surface on their back. Don't use car seats, strollers, swings, baby carriers, and baby slings for sleep. If your baby falls asleep in one of these, move them to a flat, firm surface as soon as you can.     · Cough. Coughing is a normal part of this illness. A cool mist humidifier at the bedside may help. Clean the humidifier every day to prevent mold. Over-the-counter cough and cold medicines don't help any better than syrup with no medicine in it. They also can cause serious side effects, especially in babies under 2 years of age. Don't give OTC cough or cold medicines to children under 6 years unless your healthcare provider has specifically advised you to do so.  ? Keep your child away from cigarette smoke. It can make the cough worse. Don't let anyone smoke in your house or car.  · Nasal congestion. Suction the nose of babies with a bulb syringe. You may put 2 to 3 drops of saltwater (saline) nose drops in each nostril before suctioning. This helps thin and remove secretions. Saline nose drops are available without a prescription. You can also use 1/4 teaspoon of table salt dissolved in 1 cup of water.  · Fever. Use children’s acetaminophen for fever, fussiness, or discomfort, unless another medicine was prescribed. In babies over 6 months of age, you may use  children’s ibuprofen or acetaminophen. If your child has chronic liver or kidney disease, talk with your child's healthcare provider before using these medicines. Also talk with the provider if your child has had a stomach ulcer or digestive bleeding. Never give aspirin to anyone younger than 18 years of age who is ill with a viral infection or fever. It may cause severe liver or brain damage.  · Preventing spread. Washing your hands before and after touching your sick child will help prevent a new infection. It will also help prevent the spread of this viral illness to yourself and other children. In an age-appropriate manner, teach your children when, how, and why to wash their hands. Role model correct handwashing. Encourage adults in your home to wash hands often.    Follow-up care  Follow up with your healthcare provider, or as advised.  When to seek medical advice  For a usually healthy child, call your child's healthcare provider right away if any of these occur:   · A fever (see Fever and children, below)  · Earache, sinus pain, stiff or painful neck, headache, repeated diarrhea, or vomiting.  · Unusual fussiness.  · A new rash appears.  · Your child is dehydrated, with one or more of these symptoms:  ? No tears when crying.  ? “Sunken” eyes or a dry mouth.  ? No wet diapers for 8 hours in infants.  ? Reduced urine output in older children.  · Your child has new symptoms or you are worried or confused by your child's condition.  Call 911  Call 911 if any of these occur:   · Increased wheezing or difficulty breathing  · Unusual drowsiness or confusion  · Fast breathing:  ? Birth to 6 weeks: over 60 breaths per minute  ? 6 weeks to 2 years: over 45 breaths per minute  ? 3 to 6 years: over 35 breaths per minute  ? 7 to 10 years: over 30 breaths per minute  ? Older than 10 years: over 25 breaths per minute  Fever and children  Always use a digital thermometer to check your child’s temperature. Never use a  mercury thermometer.   For infants and toddlers, be sure to use a rectal thermometer correctly. A rectal thermometer may accidentally poke a hole in (perforate) the rectum. It may also pass on germs from the stool. Always follow the product maker’s directions for proper use. If you don’t feel comfortable taking a rectal temperature, use another method. When you talk to your child’s healthcare provider, tell him or her which method you used to take your child’s temperature.   Here are guidelines for fever temperature. Ear temperatures aren’t accurate before 6 months of age. Don’t take an oral temperature until your child is at least 4 years old.   Infant under 3 months old:  · Ask your child’s healthcare provider how you should take the temperature.  · Rectal or forehead (temporal artery) temperature of 100.4°F (38°C) or higher, or as directed by the provider  · Armpit temperature of 99°F (37.2°C) or higher, or as directed by the provider  Child age 3 to 36 months:  · Rectal, forehead (temporal artery), or ear temperature of 102°F (38.9°C) or higher, or as directed by the provider  · Armpit temperature of 101°F (38.3°C) or higher, or as directed by the provider  Child of any age:  · Repeated temperature of 104°F (40°C) or higher, or as directed by the provider  · Fever that lasts more than 24 hours in a child under 2 years old. Or a fever that lasts for 3 days in a child 2 years or older.  [place] last reviewed this educational content on 6/1/2018 © 2000-2021 [employer]. All rights reserved. This information is not intended as a substitute for professional medical care. Always follow your healthcare professional's instructions.         Patient Education     Acute Otitis Media with Infection (Child)    Your child has a middle ear infection (acute otitis media). It's caused by bacteria or viruses. The middle ear is the space behind the eardrum. The eustachian tube connects the ear to the nasal passage.  The eustachian tubes help drain fluid from the ears. They also keep the air pressure equal inside and outside the ears. These tubes are shorter and more horizontal in children. This makes it more likely for the tubes to become blocked. A blockage lets fluid and pressure build up in the middle ear. Bacteria or fungi can grow in this fluid and cause an ear infection. This infection is commonly known as an earache.   The main symptom of an ear infection is ear pain. Other symptoms may include pulling at the ear, being more fussy than usual, fever, decreased appetite, and vomiting or diarrhea. Your child’s hearing may also be affected. Your child may have had a respiratory infection first.   An ear infection may clear up on its own. Or your child may need to take medicine. After the infection goes away, your child may still have fluid in the middle ear. It may take weeks or months for this fluid to go away. During that time, your child may have temporary hearing loss. But all other symptoms of the earache should be gone.   Home care  Follow these guidelines when caring for your child at home:  · The healthcare provider will likely prescribe medicines for pain. The provider may also prescribe antibiotics to treat the infection. These may be liquid medicines to give by mouth. Or they may be ear drops. Follow the provider’s instructions for giving these medicines to your child.  Don't give your child any other medicine without first asking your child's healthcare provider, especially the first time.  · Because ear infections can clear up on their own, the provider may suggest waiting for a few days before giving your child medicines for infection.  · To reduce pain, have your child rest in an upright position. Hot or cold compresses held against the ear may help ease pain.  · Don't smoke in the house or around your child. Keep your child away from secondhand smoke.  To help prevent future infections:  · Don't smoke near  your child. Secondhand smoke raises the risk for ear infections in children.  · Make sure your child gets all appropriate vaccines.  · Don't bottle-feed while your baby is lying on his or her back. (This position can cause middle ear infections because it allows milk to run into the eustachian tubes.)      · If you breastfeed, continue until your child is 6 to 12 months of age.  To apply ear drops:  1. Put the bottle in warm water if the medicine is kept in the refrigerator. Cold drops in the ear are uncomfortable.  2. Have your child lie down on a flat surface. Gently hold your child’s head to one side.  3. Remove any drainage from the ear with a clean tissue or cotton swab. Clean only the outer ear. Don’t put the cotton swab into the ear canal.  4. Straighten the ear canal by gently pulling the earlobe up and back.  5. Keep the dropper a half-inch above the ear canal. This will keep the dropper from becoming contaminated. Put the drops against the side of the ear canal.  6. Have your child stay lying down for 2 to 3 minutes. This gives time for the medicine to enter the ear canal. If your child doesn’t have pain, gently massage the outer ear near the opening.  7. Wipe any extra medicine away from the outer ear with a clean cotton ball.    Follow-up care  Follow up with your child’s healthcare provider as directed. Your child will need to have the ear rechecked to make sure the infection has gone away. Check with the healthcare provider to see when they want to see your child.   Special note to parents  If your child continues to get earaches, he or she may need ear tubes. The provider will put small tubes in your child’s eardrum to help keep fluid from building up. This procedure is a simple and works well.   When to seek medical advice  Call your child's healthcare provider for any of the following:   · Fever (see Fever and children, below)  · New symptoms, especially swelling around the ear or weakness of face  muscles  · Severe pain  · Infection seems to get worse, not better   · Neck pain  · Your child acts very sick or not himself or herself  · Fever or pain don't improve with antibiotics after 48 hours  Fever and children  Use a digital thermometer to check your child’s temperature. Don’t use a mercury thermometer. There are different kinds and uses of digital thermometers. They include:   · Rectal. For children younger than 3 years, a rectal temperature is the most accurate.  · Forehead (temporal). This works for children age 3 months and older. If a child under 3 months old has signs of illness, this can be used for a first pass. The provider may want to confirm with a rectal temperature.  · Ear (tympanic). Ear temperatures are accurate after 6 months of age, but not before.  · Armpit (axillary). This is the least reliable but may be used for a first pass to check a child of any age with signs of illness. The provider may want to confirm with a rectal temperature.  · Mouth (oral). Don’t use a thermometer in your child’s mouth until he or she is at least 4 years old.  Use the rectal thermometer with care. Follow the product maker’s directions for correct use. Insert it gently. Label it and make sure it’s not used in the mouth. It may pass on germs from the stool. If you don’t feel OK using a rectal thermometer, ask the healthcare provider what type to use instead. When you talk with any healthcare provider about your child’s fever, tell him or her which type you used.   Below are guidelines to know if your young child has a fever. Your child’s healthcare provider may give you different numbers for your child. Follow your provider’s specific instructions.   Fever readings for a baby under 3 months old:   · First, ask your child’s healthcare provider how you should take the temperature.  · Rectal or forehead: 100.4°F (38°C) or higher  · Armpit: 99°F (37.2°C) or higher  Fever readings for a child age 3 months to 36  months (3 years):   · Rectal, forehead, or ear: 102°F (38.9°C) or higher  · Armpit: 101°F (38.3°C) or higher  Call the healthcare provider in these cases:   · Repeated temperature of 104°F (40°C) or higher in a child of any age  · Fever of 100.4° F (38° C) or higher in baby younger than 3 months  · Fever that lasts more than 24 hours in a child under age 2  · Fever that lasts for 3 days in a child age 2 or older    [place] last reviewed this educational content on 2020 © 2000-2021 [employer]. All rights reserved. This information is not intended as a substitute for professional medical care. Always follow your healthcare professional's instructions.            oral

## 2022-05-10 NOTE — H&P ADULT - PROBLEM SELECTOR PROBLEM 6
Discontinue Regimen: Tri-Fay cream Detail Level: Detailed Render In Strict Bullet Format?: No Acute on chronic combined systolic and diastolic heart failure

## 2022-08-25 NOTE — ED PROVIDER NOTE - CROS ED GI ALL NEG
Impression: Age-related nuclear cataract, bilateral: H25.13. Plan: No treatment is required at this time. Will continue to observe condition and or symptoms.
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 Plan: IOP not any better ou, Pt admits non-compliance with Carola Poe,  Educated patient on risks of non-compliance. Pt to d/c Timolol (pt having upset stomach), switch to Brimonidine BID OU, Continue with Latanoprost QHS OU.

oct ordered and done today ou- normal NFL ou.
negative...

## 2022-12-06 NOTE — PHYSICAL THERAPY INITIAL EVALUATION ADULT - ACTIVE RANGE OF MOTION EXAMINATION, REHAB EVAL
- He desires to continue cholesterol medication, but does not want to check his cholesterol anymore at his age.    bilateral upper extremity Active ROM was WFL (within functional limits)/bilateral  lower extremity Active ROM was WFL (within functional limits)

## 2023-03-06 NOTE — ED ADULT TRIAGE NOTE - DIRECT TO ROOM CARE INITIATED:
Castro Guillen is a 32 year old male presenting to the walk-in clinic today for hand rash on R hand starting the beginning of January after his cat scratched him. Pt also got rash near R thumb from rubbing against gloves at work. Since then, rash has grown and developed on L hand too.     Small red bumps    Swabs/Specimens collected during rooming process:  None    PPE worn during room process  Writer: N95, Face shield/eye protection, gown, gloves  Patient: mask   Patient would like communication of their results via:     Cell Phone:   Telephone Information:   Mobile [phone]     Okay to leave a message containing results? Yes     07-Oct-2018 18:13

## 2023-06-27 NOTE — PROGRESS NOTE ADULT - SUBJECTIVE AND OBJECTIVE BOX
afebrile/  resting well    REVIEW OF SYSTEMS:  GEN: no fever,    no chills  RESP: no SOB,   no cough  CVS: no chest pain,   no palpitations  GI: no abdominal pain,   no nausea,   no vomiting,   no constipation,   no diarrhea  : no dysuria,   no frequency  NEURO: no headache,   no dizziness  PSYCH: no depression,   not anxious  Derm : no rash    MEDICATIONS  (STANDING):  aspirin enteric coated 81 milliGRAM(s) Oral daily  digoxin     Tablet 0.125 milliGRAM(s) Oral daily  enoxaparin Injectable 40 milliGRAM(s) SubCutaneous daily  influenza   Vaccine 0.5 milliLiter(s) IntraMuscular once  levETIRAcetam 500 milliGRAM(s) Oral two times a day  metoprolol succinate  milliGRAM(s) Oral two times a day  micafungin IVPB      micafungin IVPB 100 milliGRAM(s) IV Intermittent every 24 hours  simvastatin 20 milliGRAM(s) Oral at bedtime  sodium chloride 0.9%. 1000 milliLiter(s) (50 mL/Hr) IV Continuous <Continuous>  tamsulosin 0.4 milliGRAM(s) Oral at bedtime    MEDICATIONS  (PRN):      Vital Signs Last 24 Hrs  T(C): 36.8 (30 Sep 2018 07:23), Max: 37.2 (29 Sep 2018 16:07)  T(F): 98.3 (30 Sep 2018 07:23), Max: 98.9 (29 Sep 2018 16:07)  HR: 62 (30 Sep 2018 07:23) (61 - 99)  BP: 175/69 (30 Sep 2018 07:23) (141/59 - 175/69)  BP(mean): --  RR: 18 (30 Sep 2018 07:23) (18 - 18)  SpO2: 97% (30 Sep 2018 07:23) (96% - 97%)  CAPILLARY BLOOD GLUCOSE        I&O's Summary    29 Sep 2018 07:01  -  30 Sep 2018 07:00  --------------------------------------------------------  IN: 1644 mL / OUT: 550 mL / NET: 1094 mL        PHYSICAL EXAM:  HEAD:  Atraumatic, Normocephalic  NECK: Supple, No   JVD  CHEST/LUNG:   no     rales,     no,    rhonchi  HEART: Regular rate and rhythm;         murmur  ABDOMEN: Soft, Nontender, ;   EXTREMITIES:        edema  NEUROLOGY:  alert    LABS:                        11.1   8.2   )-----------( 182      ( 29 Sep 2018 09:43 )             32.9     09-29    137  |  103  |  27<H>  ----------------------------<  118<H>  3.3<L>   |  22  |  1.23    Ca    9.7      29 Sep 2018 09:43    TPro  6.6  /  Alb  3.2<L>  /  TBili  0.3  /  DBili  x   /  AST  65<H>  /  ALT  34  /  AlkPhos  87  09-29                    Thyroid Stimulating Hormone, Serum: 2.67 uIU/mL (09-27 @ 14:14)          Consultant(s) Notes Reviewed:      Care Discussed with Consultants/Other Providers: No

## 2024-05-15 NOTE — DISCHARGE NOTE ADULT - DISCHARGE TO
Detail Level: Detailed Depth Of Biopsy: dermis Was A Bandage Applied: Yes Size Of Lesion In Cm: 0.3 X Size Of Lesion In Cm: 0 Biopsy Type: H and E Biopsy Method: Dermablade Anesthesia Type: 1% lidocaine with epinephrine Anesthesia Volume In Cc: 0.5 Hemostasis: Drysol Wound Care: Petrolatum Dressing: bandage Destruction After The Procedure: No Type Of Destruction Used: Curettage Curettage Text: The wound bed was treated with curettage after the biopsy was performed. Cryotherapy Text: The wound bed was treated with cryotherapy after the biopsy was performed. Electrodesiccation Text: The wound bed was treated with electrodesiccation after the biopsy was performed. Electrodesiccation And Curettage Text: The wound bed was treated with electrodesiccation and curettage after the biopsy was performed. Silver Nitrate Text: The wound bed was treated with silver nitrate after the biopsy was performed. Lab: [id number] Consent: Written consent was obtained and risks were reviewed including but not limited to scarring, infection, bleeding, scabbing, incomplete removal, nerve damage and allergy to anesthesia. Post-Care Instructions: I reviewed with the patient in detail post-care instructions. Patient is to keep the biopsy site dry overnight, and then apply bacitracin twice daily until healed. Patient may apply hydrogen peroxide soaks to remove any crusting. Notification Instructions: Patient will be notified of biopsy results. However, patient instructed to call the office if not contacted within 2 weeks. Billing Type: Third-Party Bill Information: Selecting Yes will display possible errors in your note based on the variables you have selected. This validation is only offered as a suggestion for you. PLEASE NOTE THAT THE VALIDATION TEXT WILL BE REMOVED WHEN YOU FINALIZE YOUR NOTE. IF YOU WANT TO FAX A PRELIMINARY NOTE YOU WILL NEED TO TOGGLE THIS TO 'NO' IF YOU DO NOT WANT IT IN YOUR FAXED NOTE. Biopsy Method: double edge Personna blade Rehab

## 2024-11-22 NOTE — PATIENT PROFILE ADULT. - TEACHING/LEARNING FACTORS INFLUENCE READINESS TO LEARN
[FreeTextEntry1] : Historical Perspective: 70 year old male with history of CAD ( of the RCA with collaterals from the LAD), ischemic CM (LV EF 45%), TIA in 2016, s/p ILR at that time with no PAF found (battery currently dead), HTN, HLD, hypothyroidism presents to establish care with a new cardiologist. Former patient of [facility] and wants to switch cardiologist out of convenience as he lives in [place]. He has no chest pain, SOB, or palpitations. He admits to compliance with his medications and reports no adverse effects.   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 Had surgery on it at [place] 9/20/2022 with Dr. Carlson and followed with [facility] for chemotherapy and radiation. No in remission.  Patient with no chest pain, dyspnea, or palpitations.
none

## 2024-12-31 NOTE — DISCHARGE NOTE ADULT - DISCHARGE DATE
[Maximal Pain Intensity: 0/10] : 0/10 [Least Pain Intensity: 0/10] : 0/10 [90: Able to carry normal activity; minor signs or symptoms of disease.] : 90: Able to carry normal activity; minor signs or symptoms of disease.  [Date: ____________] : Patient's last distress assessment performed on [unfilled]. [4 - Distress Level] : Distress Level: 4 10-May-2018 No

## 2025-03-20 NOTE — ED PROVIDER NOTE - OBJECTIVE STATEMENT
89 yr old male with PMH of TIA, seizure disorder, former smoker,  A Fib on eliquis recently Dced for significant hematuria, HTN, HLD, Systolic HF with EF 30-35% in 2016,  prostate cancer- s/p seed implant,  25 yrs  ago dxed last week with bladder CA scheduled for surgery sent in by Dr Trejo for progressive ADAMS and chest pressure over the last few days. reports he has symptoms when walking and thigns resolve when resting for about 10 minutes. continued hematuria although has not been on eliquis for 2 weeks, on ASA. denies dark tarry stools and hematochezia. mild dry cough without fever/other URI symptoms. pacer checked today    card - michael  last admission [facility] NEGATIVE/UCG NEGATIVE/UCG/Results in MD note

## 2025-03-31 NOTE — ED PROVIDER NOTE - NS ED ATTENDING STATEMENT MOD
Caller: dionte palomino    Relationship: Emergency Contact    Best call back number: [phone]    What is the best time to reach you:     Who are you requesting to speak with (clinical staff, provider,  specific staff member): DR NERI    Do you know the name of the person who called:     What was the call regarding:  PT IS AT [facility] [address] [facility] DUE TO HAVING ANOTHER STROKE    THEY ARE WANTING TO DO AN MRI AND NEED SOMEONE TO TURN VNS OFF AND ON AGAIN.    PLEASE CALL DIONTE BACK TO DISCUSS PT'S VNS.   I have personally seen and examined this patient.  I have fully participated in the care of this patient. I have reviewed all pertinent clinical information, including history, physical exam, plan and the Resident’s note and agree except as noted.